# Patient Record
Sex: FEMALE | Race: WHITE | NOT HISPANIC OR LATINO | Employment: OTHER | URBAN - METROPOLITAN AREA
[De-identification: names, ages, dates, MRNs, and addresses within clinical notes are randomized per-mention and may not be internally consistent; named-entity substitution may affect disease eponyms.]

---

## 2017-05-10 ENCOUNTER — APPOINTMENT (OUTPATIENT)
Dept: CT IMAGING | Facility: HOSPITAL | Age: 82
End: 2017-05-10

## 2017-05-10 ENCOUNTER — HOSPITAL ENCOUNTER (INPATIENT)
Facility: HOSPITAL | Age: 82
LOS: 5 days | Discharge: NURSING, REHAB OR LONG-TERM CARE | End: 2017-05-16
Attending: EMERGENCY MEDICINE | Admitting: INTERNAL MEDICINE

## 2017-05-10 DIAGNOSIS — Z74.09 IMPAIRED FUNCTIONAL MOBILITY, BALANCE, GAIT, AND ENDURANCE: ICD-10-CM

## 2017-05-10 DIAGNOSIS — M06.9 RHEUMATOID ARTHRITIS INVOLVING MULTIPLE SITES, UNSPECIFIED RHEUMATOID FACTOR PRESENCE: ICD-10-CM

## 2017-05-10 DIAGNOSIS — Z78.9 IMPAIRED MOBILITY AND ADLS: ICD-10-CM

## 2017-05-10 DIAGNOSIS — M54.9 INTRACTABLE BACK PAIN: ICD-10-CM

## 2017-05-10 DIAGNOSIS — R53.1 WEAKNESS: ICD-10-CM

## 2017-05-10 DIAGNOSIS — R29.898 WEAKNESS OF BOTH LOWER EXTREMITIES: ICD-10-CM

## 2017-05-10 DIAGNOSIS — Z74.09 IMPAIRED MOBILITY AND ADLS: ICD-10-CM

## 2017-05-10 DIAGNOSIS — M54.5 LOW BACK PAIN, UNSPECIFIED BACK PAIN LATERALITY, UNSPECIFIED CHRONICITY, WITH SCIATICA PRESENCE UNSPECIFIED: Primary | ICD-10-CM
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
## 2017-05-10 DIAGNOSIS — M48.00 SPINAL STENOSIS, UNSPECIFIED SPINAL REGION: ICD-10-CM

## 2017-05-10 LAB
ALBUMIN SERPL-MCNC: 3.9 G/DL (ref 3.2–4.8)
ALBUMIN/GLOB SERPL: 1.8 G/DL (ref 1.5–2.5)
ALP SERPL-CCNC: 115 U/L (ref 25–100)
ALT SERPL W P-5'-P-CCNC: 22 U/L (ref 7–40)
ANION GAP SERPL CALCULATED.3IONS-SCNC: 5 MMOL/L (ref 3–11)
AST SERPL-CCNC: 20 U/L (ref 0–33)
BACTERIA UR QL AUTO: ABNORMAL /HPF
BASOPHILS # BLD AUTO: 0.02 10*3/MM3 (ref 0–0.2)
BASOPHILS NFR BLD AUTO: 0.3 % (ref 0–1)
BILIRUB SERPL-MCNC: 0.4 MG/DL (ref 0.3–1.2)
BILIRUB UR QL STRIP: NEGATIVE
BUN BLD-MCNC: 14 MG/DL (ref 9–23)
BUN/CREAT SERPL: 23.3 (ref 7–25)
CALCIUM SPEC-SCNC: 9.9 MG/DL (ref 8.7–10.4)
CHLORIDE SERPL-SCNC: 104 MMOL/L (ref 99–109)
CLARITY UR: CLEAR
CO2 SERPL-SCNC: 33 MMOL/L (ref 20–31)
COLOR UR: YELLOW
CREAT BLD-MCNC: 0.6 MG/DL (ref 0.6–1.3)
DEPRECATED RDW RBC AUTO: 58.6 FL (ref 37–54)
EOSINOPHIL # BLD AUTO: 0.34 10*3/MM3 (ref 0.1–0.3)
EOSINOPHIL NFR BLD AUTO: 5 % (ref 0–3)
ERYTHROCYTE [DISTWIDTH] IN BLOOD BY AUTOMATED COUNT: 15.4 % (ref 11.3–14.5)
GFR SERPL CREATININE-BSD FRML MDRD: 96 ML/MIN/1.73
GLOBULIN UR ELPH-MCNC: 2.2 GM/DL
GLUCOSE BLD-MCNC: 86 MG/DL (ref 70–100)
GLUCOSE UR STRIP-MCNC: NEGATIVE MG/DL
HCT VFR BLD AUTO: 38.1 % (ref 34.5–44)
HGB BLD-MCNC: 12.1 G/DL (ref 11.5–15.5)
HGB UR QL STRIP.AUTO: NEGATIVE
HYALINE CASTS UR QL AUTO: ABNORMAL /LPF
IMM GRANULOCYTES # BLD: 0.03 10*3/MM3 (ref 0–0.03)
IMM GRANULOCYTES NFR BLD: 0.4 % (ref 0–0.6)
KETONES UR QL STRIP: NEGATIVE
LEUKOCYTE ESTERASE UR QL STRIP.AUTO: ABNORMAL
LYMPHOCYTES # BLD AUTO: 3.05 10*3/MM3 (ref 0.6–4.8)
LYMPHOCYTES NFR BLD AUTO: 45.1 % (ref 24–44)
MCH RBC QN AUTO: 32.9 PG (ref 27–31)
MCHC RBC AUTO-ENTMCNC: 31.8 G/DL (ref 32–36)
MCV RBC AUTO: 103.5 FL (ref 80–99)
MONOCYTES # BLD AUTO: 0.67 10*3/MM3 (ref 0–1)
MONOCYTES NFR BLD AUTO: 9.9 % (ref 0–12)
NEUTROPHILS # BLD AUTO: 2.65 10*3/MM3 (ref 1.5–8.3)
NEUTROPHILS NFR BLD AUTO: 39.3 % (ref 41–71)
NITRITE UR QL STRIP: NEGATIVE
PH UR STRIP.AUTO: 7.5 [PH] (ref 5–8)
PLATELET # BLD AUTO: 182 10*3/MM3 (ref 150–450)
PMV BLD AUTO: 10.7 FL (ref 6–12)
POTASSIUM BLD-SCNC: 4.3 MMOL/L (ref 3.5–5.5)
PROT SERPL-MCNC: 6.1 G/DL (ref 5.7–8.2)
PROT UR QL STRIP: NEGATIVE
RBC # BLD AUTO: 3.68 10*6/MM3 (ref 3.89–5.14)
RBC # UR: ABNORMAL /HPF
REF LAB TEST METHOD: ABNORMAL
SODIUM BLD-SCNC: 142 MMOL/L (ref 132–146)
SP GR UR STRIP: 1.01 (ref 1–1.03)
SQUAMOUS #/AREA URNS HPF: ABNORMAL /HPF
UROBILINOGEN UR QL STRIP: ABNORMAL
WBC NRBC COR # BLD: 6.76 10*3/MM3 (ref 3.5–10.8)
WBC UR QL AUTO: ABNORMAL /HPF

## 2017-05-10 PROCEDURE — 80053 COMPREHEN METABOLIC PANEL: CPT | Performed by: NURSE PRACTITIONER

## 2017-05-10 PROCEDURE — 72131 CT LUMBAR SPINE W/O DYE: CPT

## 2017-05-10 PROCEDURE — 72192 CT PELVIS W/O DYE: CPT

## 2017-05-10 PROCEDURE — 85025 COMPLETE CBC W/AUTO DIFF WBC: CPT | Performed by: NURSE PRACTITIONER

## 2017-05-10 PROCEDURE — 81001 URINALYSIS AUTO W/SCOPE: CPT | Performed by: NURSE PRACTITIONER

## 2017-05-10 PROCEDURE — 87086 URINE CULTURE/COLONY COUNT: CPT | Performed by: NURSE PRACTITIONER

## 2017-05-10 PROCEDURE — 99284 EMERGENCY DEPT VISIT MOD MDM: CPT

## 2017-05-10 RX ORDER — BENAZEPRIL HYDROCHLORIDE 10 MG/1
10 TABLET ORAL DAILY
COMMUNITY

## 2017-05-10 RX ORDER — DOCUSATE SODIUM 100 MG/1
100 CAPSULE, LIQUID FILLED ORAL 2 TIMES DAILY
COMMUNITY

## 2017-05-10 RX ORDER — BACLOFEN 10 MG/1
10 TABLET ORAL 4 TIMES DAILY
COMMUNITY

## 2017-05-10 RX ORDER — LOVASTATIN 20 MG/1
20 TABLET ORAL NIGHTLY
COMMUNITY
End: 2017-06-05

## 2017-05-10 RX ORDER — COLCHICINE 0.6 MG/1
0.6 TABLET ORAL DAILY
COMMUNITY

## 2017-05-10 RX ORDER — ALLOPURINOL 100 MG/1
200 TABLET ORAL DAILY
COMMUNITY

## 2017-05-10 RX ORDER — CHLORAL HYDRATE 500 MG
1000 CAPSULE ORAL
COMMUNITY

## 2017-05-10 RX ORDER — GABAPENTIN 800 MG/1
1600 TABLET ORAL EVERY 6 HOURS
COMMUNITY

## 2017-05-10 RX ORDER — OMEPRAZOLE 20 MG/1
20 CAPSULE, DELAYED RELEASE ORAL DAILY
COMMUNITY
End: 2017-06-05

## 2017-05-10 RX ORDER — LEVOTHYROXINE SODIUM 0.07 MG/1
75 TABLET ORAL DAILY
COMMUNITY

## 2017-05-10 RX ORDER — SODIUM CHLORIDE 0.9 % (FLUSH) 0.9 %
10 SYRINGE (ML) INJECTION AS NEEDED
Status: DISCONTINUED | OUTPATIENT
Start: 2017-05-10 | End: 2017-05-16 | Stop reason: HOSPADM

## 2017-05-11 ENCOUNTER — APPOINTMENT (OUTPATIENT)
Dept: GENERAL RADIOLOGY | Facility: HOSPITAL | Age: 82
End: 2017-05-11

## 2017-05-11 ENCOUNTER — APPOINTMENT (OUTPATIENT)
Dept: MRI IMAGING | Facility: HOSPITAL | Age: 82
End: 2017-05-11

## 2017-05-11 PROBLEM — M06.9 RHEUMATOID ARTHRITIS (HCC): Status: ACTIVE | Noted: 2017-05-11

## 2017-05-11 PROBLEM — M54.9 INTRACTABLE BACK PAIN: Status: ACTIVE | Noted: 2017-05-11

## 2017-05-11 PROBLEM — K21.9 GERD (GASTROESOPHAGEAL REFLUX DISEASE): Status: ACTIVE | Noted: 2017-05-11

## 2017-05-11 PROBLEM — E03.9 HYPOTHYROID: Status: ACTIVE | Noted: 2017-05-11

## 2017-05-11 PROBLEM — I10 HYPERTENSION: Status: ACTIVE | Noted: 2017-05-11

## 2017-05-11 PROBLEM — M48.00 SPINAL STENOSIS: Status: ACTIVE | Noted: 2017-05-11

## 2017-05-11 PROBLEM — M54.50 LOW BACK PAIN: Status: ACTIVE | Noted: 2017-05-11

## 2017-05-11 PROBLEM — R53.1 WEAKNESS: Status: ACTIVE | Noted: 2017-05-11

## 2017-05-11 PROBLEM — E78.5 HYPERLIPIDEMIA: Status: ACTIVE | Noted: 2017-05-11

## 2017-05-11 LAB
ALBUMIN SERPL-MCNC: 4.5 G/DL (ref 3.2–4.8)
ALBUMIN/GLOB SERPL: 1.7 G/DL (ref 1.5–2.5)
ALP SERPL-CCNC: 134 U/L (ref 25–100)
ALT SERPL W P-5'-P-CCNC: 24 U/L (ref 7–40)
ANION GAP SERPL CALCULATED.3IONS-SCNC: 4 MMOL/L (ref 3–11)
ANION GAP SERPL CALCULATED.3IONS-SCNC: 9 MMOL/L (ref 3–11)
AST SERPL-CCNC: 23 U/L (ref 0–33)
BILIRUB SERPL-MCNC: 0.6 MG/DL (ref 0.3–1.2)
BUN BLD-MCNC: 12 MG/DL (ref 9–23)
BUN BLD-MCNC: 17 MG/DL (ref 9–23)
BUN/CREAT SERPL: 20 (ref 7–25)
BUN/CREAT SERPL: 21.3 (ref 7–25)
CA-I SERPL ISE-MCNC: 1.25 MMOL/L (ref 1.12–1.32)
CALCIUM SPEC-SCNC: 10 MG/DL (ref 8.7–10.4)
CALCIUM SPEC-SCNC: 10.7 MG/DL (ref 8.7–10.4)
CHLORIDE SERPL-SCNC: 106 MMOL/L (ref 99–109)
CHLORIDE SERPL-SCNC: 109 MMOL/L (ref 99–109)
CO2 SERPL-SCNC: 28 MMOL/L (ref 20–31)
CO2 SERPL-SCNC: 31 MMOL/L (ref 20–31)
CREAT BLD-MCNC: 0.6 MG/DL (ref 0.6–1.3)
CREAT BLD-MCNC: 0.8 MG/DL (ref 0.6–1.3)
DEPRECATED RDW RBC AUTO: 56.1 FL (ref 37–54)
ERYTHROCYTE [DISTWIDTH] IN BLOOD BY AUTOMATED COUNT: 15.3 % (ref 11.3–14.5)
GFR SERPL CREATININE-BSD FRML MDRD: 69 ML/MIN/1.73
GFR SERPL CREATININE-BSD FRML MDRD: 96 ML/MIN/1.73
GLOBULIN UR ELPH-MCNC: 2.7 GM/DL
GLUCOSE BLD-MCNC: 190 MG/DL (ref 70–100)
GLUCOSE BLD-MCNC: 94 MG/DL (ref 70–100)
GLUCOSE BLDC GLUCOMTR-MCNC: 120 MG/DL (ref 70–130)
HCT VFR BLD AUTO: 37.8 % (ref 34.5–44)
HGB BLD-MCNC: 12 G/DL (ref 11.5–15.5)
MCH RBC QN AUTO: 31.9 PG (ref 27–31)
MCHC RBC AUTO-ENTMCNC: 31.7 G/DL (ref 32–36)
MCV RBC AUTO: 100.5 FL (ref 80–99)
PLATELET # BLD AUTO: 191 10*3/MM3 (ref 150–450)
PMV BLD AUTO: 11.3 FL (ref 6–12)
POTASSIUM BLD-SCNC: 3.7 MMOL/L (ref 3.5–5.5)
POTASSIUM BLD-SCNC: 4.5 MMOL/L (ref 3.5–5.5)
PROT SERPL-MCNC: 7.2 G/DL (ref 5.7–8.2)
RBC # BLD AUTO: 3.76 10*6/MM3 (ref 3.89–5.14)
SODIUM BLD-SCNC: 141 MMOL/L (ref 132–146)
SODIUM BLD-SCNC: 146 MMOL/L (ref 132–146)
WBC NRBC COR # BLD: 5.3 10*3/MM3 (ref 3.5–10.8)

## 2017-05-11 PROCEDURE — 80053 COMPREHEN METABOLIC PANEL: CPT | Performed by: PHYSICIAN ASSISTANT

## 2017-05-11 PROCEDURE — 72080 X-RAY EXAM THORACOLMB 2/> VW: CPT

## 2017-05-11 PROCEDURE — 82962 GLUCOSE BLOOD TEST: CPT

## 2017-05-11 PROCEDURE — 82330 ASSAY OF CALCIUM: CPT | Performed by: HOSPITALIST

## 2017-05-11 PROCEDURE — 93010 ELECTROCARDIOGRAM REPORT: CPT | Performed by: INTERNAL MEDICINE

## 2017-05-11 PROCEDURE — 85027 COMPLETE CBC AUTOMATED: CPT | Performed by: PHYSICIAN ASSISTANT

## 2017-05-11 PROCEDURE — 25010000002 HYDRALAZINE PER 20 MG: Performed by: PHYSICIAN ASSISTANT

## 2017-05-11 PROCEDURE — 93005 ELECTROCARDIOGRAM TRACING: CPT | Performed by: HOSPITALIST

## 2017-05-11 PROCEDURE — 97162 PT EVAL MOD COMPLEX 30 MIN: CPT

## 2017-05-11 PROCEDURE — 97166 OT EVAL MOD COMPLEX 45 MIN: CPT

## 2017-05-11 PROCEDURE — 72148 MRI LUMBAR SPINE W/O DYE: CPT

## 2017-05-11 PROCEDURE — 99221 1ST HOSP IP/OBS SF/LOW 40: CPT | Performed by: PHYSICIAN ASSISTANT

## 2017-05-11 PROCEDURE — 99223 1ST HOSP IP/OBS HIGH 75: CPT | Performed by: INTERNAL MEDICINE

## 2017-05-11 RX ORDER — PANTOPRAZOLE SODIUM 40 MG/1
40 TABLET, DELAYED RELEASE ORAL
Status: DISCONTINUED | OUTPATIENT
Start: 2017-05-11 | End: 2017-05-16 | Stop reason: HOSPADM

## 2017-05-11 RX ORDER — SODIUM CHLORIDE 0.9 % (FLUSH) 0.9 %
1-10 SYRINGE (ML) INJECTION AS NEEDED
Status: DISCONTINUED | OUTPATIENT
Start: 2017-05-11 | End: 2017-05-16 | Stop reason: HOSPADM

## 2017-05-11 RX ORDER — ONDANSETRON 4 MG/1
4 TABLET, FILM COATED ORAL EVERY 6 HOURS PRN
Status: DISCONTINUED | OUTPATIENT
Start: 2017-05-11 | End: 2017-05-16 | Stop reason: HOSPADM

## 2017-05-11 RX ORDER — ASPIRIN 81 MG/1
81 TABLET, CHEWABLE ORAL DAILY
Status: DISCONTINUED | OUTPATIENT
Start: 2017-05-11 | End: 2017-05-16 | Stop reason: HOSPADM

## 2017-05-11 RX ORDER — ALLOPURINOL 100 MG/1
100 TABLET ORAL DAILY
Status: DISCONTINUED | OUTPATIENT
Start: 2017-05-11 | End: 2017-05-16 | Stop reason: HOSPADM

## 2017-05-11 RX ORDER — PRAVASTATIN SODIUM 20 MG
20 TABLET ORAL NIGHTLY
Status: DISCONTINUED | OUTPATIENT
Start: 2017-05-11 | End: 2017-05-16 | Stop reason: HOSPADM

## 2017-05-11 RX ORDER — LEVOTHYROXINE SODIUM 0.07 MG/1
75 TABLET ORAL DAILY
Status: DISCONTINUED | OUTPATIENT
Start: 2017-05-11 | End: 2017-05-16 | Stop reason: HOSPADM

## 2017-05-11 RX ORDER — BENAZEPRIL HYDROCHLORIDE 5 MG/1
10 TABLET, FILM COATED ORAL DAILY
Status: DISCONTINUED | OUTPATIENT
Start: 2017-05-11 | End: 2017-05-16 | Stop reason: HOSPADM

## 2017-05-11 RX ORDER — ACETAMINOPHEN 325 MG/1
650 TABLET ORAL EVERY 4 HOURS PRN
Status: DISCONTINUED | OUTPATIENT
Start: 2017-05-11 | End: 2017-05-16 | Stop reason: HOSPADM

## 2017-05-11 RX ORDER — DOCUSATE SODIUM 100 MG/1
100 CAPSULE, LIQUID FILLED ORAL 2 TIMES DAILY
Status: DISCONTINUED | OUTPATIENT
Start: 2017-05-11 | End: 2017-05-11

## 2017-05-11 RX ORDER — BACLOFEN 10 MG/1
10 TABLET ORAL 3 TIMES DAILY
Status: DISCONTINUED | OUTPATIENT
Start: 2017-05-11 | End: 2017-05-15

## 2017-05-11 RX ORDER — CASTOR OIL AND BALSAM, PERU 788; 87 MG/G; MG/G
OINTMENT TOPICAL EVERY 12 HOURS SCHEDULED
Status: DISCONTINUED | OUTPATIENT
Start: 2017-05-11 | End: 2017-05-16 | Stop reason: HOSPADM

## 2017-05-11 RX ORDER — MORPHINE SULFATE 2 MG/ML
2 INJECTION, SOLUTION INTRAMUSCULAR; INTRAVENOUS EVERY 4 HOURS PRN
Status: DISCONTINUED | OUTPATIENT
Start: 2017-05-11 | End: 2017-05-16 | Stop reason: HOSPADM

## 2017-05-11 RX ORDER — ONDANSETRON 2 MG/ML
4 INJECTION INTRAMUSCULAR; INTRAVENOUS EVERY 6 HOURS PRN
Status: DISCONTINUED | OUTPATIENT
Start: 2017-05-11 | End: 2017-05-16 | Stop reason: HOSPADM

## 2017-05-11 RX ORDER — HYDRALAZINE HYDROCHLORIDE 20 MG/ML
10 INJECTION INTRAMUSCULAR; INTRAVENOUS EVERY 6 HOURS PRN
Status: DISCONTINUED | OUTPATIENT
Start: 2017-05-11 | End: 2017-05-16 | Stop reason: HOSPADM

## 2017-05-11 RX ORDER — SODIUM CHLORIDE 9 MG/ML
100 INJECTION, SOLUTION INTRAVENOUS CONTINUOUS
Status: DISCONTINUED | OUTPATIENT
Start: 2017-05-11 | End: 2017-05-16 | Stop reason: HOSPADM

## 2017-05-11 RX ORDER — COLCHICINE 0.6 MG/1
0.6 TABLET ORAL DAILY
Status: DISCONTINUED | OUTPATIENT
Start: 2017-05-11 | End: 2017-05-16 | Stop reason: HOSPADM

## 2017-05-11 RX ORDER — MORPHINE SULFATE 4 MG/ML
4 INJECTION, SOLUTION INTRAMUSCULAR; INTRAVENOUS EVERY 4 HOURS PRN
Status: DISCONTINUED | OUTPATIENT
Start: 2017-05-11 | End: 2017-05-16 | Stop reason: HOSPADM

## 2017-05-11 RX ADMIN — BACLOFEN 10 MG: 10 TABLET ORAL at 08:19

## 2017-05-11 RX ADMIN — CASTOR OIL AND BALSAM, PERU: 788; 87 OINTMENT TOPICAL at 14:35

## 2017-05-11 RX ADMIN — ALLOPURINOL 100 MG: 100 TABLET ORAL at 08:19

## 2017-05-11 RX ADMIN — ACETAMINOPHEN 650 MG: 325 TABLET, FILM COATED ORAL at 22:19

## 2017-05-11 RX ADMIN — DOCUSATE SODIUM 100 MG: 100 CAPSULE, LIQUID FILLED ORAL at 08:19

## 2017-05-11 RX ADMIN — BENAZEPRIL HYDROCHLORIDE 10 MG: 5 TABLET, COATED ORAL at 08:19

## 2017-05-11 RX ADMIN — BACLOFEN 10 MG: 10 TABLET ORAL at 22:19

## 2017-05-11 RX ADMIN — ASPIRIN 81 MG 81 MG: 81 TABLET ORAL at 08:19

## 2017-05-11 RX ADMIN — HYDRALAZINE HYDROCHLORIDE 10 MG: 20 INJECTION INTRAMUSCULAR; INTRAVENOUS at 15:31

## 2017-05-11 RX ADMIN — CASTOR OIL AND BALSAM, PERU: 788; 87 OINTMENT TOPICAL at 22:19

## 2017-05-11 RX ADMIN — BACLOFEN 10 MG: 10 TABLET ORAL at 15:20

## 2017-05-11 RX ADMIN — LEVOTHYROXINE SODIUM 75 MCG: 75 TABLET ORAL at 05:34

## 2017-05-11 RX ADMIN — COLCHICINE 0.6 MG: 0.6 TABLET, FILM COATED ORAL at 08:19

## 2017-05-11 RX ADMIN — SODIUM CHLORIDE 100 ML/HR: 9 INJECTION, SOLUTION INTRAVENOUS at 17:54

## 2017-05-11 RX ADMIN — PRAVASTATIN SODIUM 20 MG: 20 TABLET ORAL at 22:19

## 2017-05-11 RX ADMIN — PANTOPRAZOLE SODIUM 40 MG: 40 TABLET, DELAYED RELEASE ORAL at 05:34

## 2017-05-12 LAB
ANION GAP SERPL CALCULATED.3IONS-SCNC: 10 MMOL/L (ref 3–11)
BASOPHILS # BLD AUTO: 0.01 10*3/MM3 (ref 0–0.2)
BASOPHILS NFR BLD AUTO: 0.1 % (ref 0–1)
BUN BLD-MCNC: 16 MG/DL (ref 9–23)
BUN/CREAT SERPL: 26.7 (ref 7–25)
C DIFF TOX GENS STL QL NAA+PROBE: NOT DETECTED
CALCIUM SPEC-SCNC: 10.4 MG/DL (ref 8.7–10.4)
CHLORIDE SERPL-SCNC: 107 MMOL/L (ref 99–109)
CO2 SERPL-SCNC: 25 MMOL/L (ref 20–31)
CREAT BLD-MCNC: 0.6 MG/DL (ref 0.6–1.3)
DEPRECATED RDW RBC AUTO: 56.9 FL (ref 37–54)
EOSINOPHIL # BLD AUTO: 0.01 10*3/MM3 (ref 0.1–0.3)
EOSINOPHIL NFR BLD AUTO: 0.1 % (ref 0–3)
ERYTHROCYTE [DISTWIDTH] IN BLOOD BY AUTOMATED COUNT: 15.6 % (ref 11.3–14.5)
GFR SERPL CREATININE-BSD FRML MDRD: 96 ML/MIN/1.73
GLUCOSE BLD-MCNC: 124 MG/DL (ref 70–100)
HCT VFR BLD AUTO: 43.1 % (ref 34.5–44)
HGB BLD-MCNC: 14.1 G/DL (ref 11.5–15.5)
IMM GRANULOCYTES # BLD: 0.02 10*3/MM3 (ref 0–0.03)
IMM GRANULOCYTES NFR BLD: 0.2 % (ref 0–0.6)
LYMPHOCYTES # BLD AUTO: 3.02 10*3/MM3 (ref 0.6–4.8)
LYMPHOCYTES NFR BLD AUTO: 26.1 % (ref 24–44)
MCH RBC QN AUTO: 32.6 PG (ref 27–31)
MCHC RBC AUTO-ENTMCNC: 32.7 G/DL (ref 32–36)
MCV RBC AUTO: 99.5 FL (ref 80–99)
MONOCYTES # BLD AUTO: 0.8 10*3/MM3 (ref 0–1)
MONOCYTES NFR BLD AUTO: 6.9 % (ref 0–12)
NEUTROPHILS # BLD AUTO: 7.7 10*3/MM3 (ref 1.5–8.3)
NEUTROPHILS NFR BLD AUTO: 66.6 % (ref 41–71)
PLATELET # BLD AUTO: 241 10*3/MM3 (ref 150–450)
PMV BLD AUTO: 11.8 FL (ref 6–12)
POTASSIUM BLD-SCNC: 3.5 MMOL/L (ref 3.5–5.5)
RBC # BLD AUTO: 4.33 10*6/MM3 (ref 3.89–5.14)
SODIUM BLD-SCNC: 142 MMOL/L (ref 132–146)
WBC NRBC COR # BLD: 11.56 10*3/MM3 (ref 3.5–10.8)

## 2017-05-12 PROCEDURE — 87045 FECES CULTURE AEROBIC BACT: CPT | Performed by: FAMILY MEDICINE

## 2017-05-12 PROCEDURE — 97530 THERAPEUTIC ACTIVITIES: CPT

## 2017-05-12 PROCEDURE — 99232 SBSQ HOSP IP/OBS MODERATE 35: CPT | Performed by: FAMILY MEDICINE

## 2017-05-12 PROCEDURE — 97110 THERAPEUTIC EXERCISES: CPT

## 2017-05-12 PROCEDURE — 87046 STOOL CULTR AEROBIC BACT EA: CPT | Performed by: FAMILY MEDICINE

## 2017-05-12 PROCEDURE — 87493 C DIFF AMPLIFIED PROBE: CPT | Performed by: NURSE PRACTITIONER

## 2017-05-12 PROCEDURE — 85025 COMPLETE CBC W/AUTO DIFF WBC: CPT | Performed by: FAMILY MEDICINE

## 2017-05-12 PROCEDURE — 25010000002 HYDRALAZINE PER 20 MG: Performed by: PHYSICIAN ASSISTANT

## 2017-05-12 PROCEDURE — 80048 BASIC METABOLIC PNL TOTAL CA: CPT | Performed by: FAMILY MEDICINE

## 2017-05-12 RX ORDER — AMLODIPINE BESYLATE 5 MG/1
5 TABLET ORAL
Status: DISCONTINUED | OUTPATIENT
Start: 2017-05-12 | End: 2017-05-15

## 2017-05-12 RX ADMIN — LEVOTHYROXINE SODIUM 75 MCG: 75 TABLET ORAL at 06:22

## 2017-05-12 RX ADMIN — Medication 5 MG: at 21:05

## 2017-05-12 RX ADMIN — PRAVASTATIN SODIUM 20 MG: 20 TABLET ORAL at 21:05

## 2017-05-12 RX ADMIN — BACLOFEN 10 MG: 10 TABLET ORAL at 11:00

## 2017-05-12 RX ADMIN — HYDRALAZINE HYDROCHLORIDE 10 MG: 20 INJECTION INTRAMUSCULAR; INTRAVENOUS at 22:08

## 2017-05-12 RX ADMIN — CASTOR OIL AND BALSAM, PERU: 788; 87 OINTMENT TOPICAL at 11:01

## 2017-05-12 RX ADMIN — ALLOPURINOL 100 MG: 100 TABLET ORAL at 10:59

## 2017-05-12 RX ADMIN — COLCHICINE 0.6 MG: 0.6 TABLET, FILM COATED ORAL at 11:00

## 2017-05-12 RX ADMIN — CASTOR OIL AND BALSAM, PERU: 788; 87 OINTMENT TOPICAL at 21:05

## 2017-05-12 RX ADMIN — BACLOFEN 10 MG: 10 TABLET ORAL at 17:20

## 2017-05-12 RX ADMIN — BACLOFEN 10 MG: 10 TABLET ORAL at 21:05

## 2017-05-12 RX ADMIN — AMLODIPINE BESYLATE 5 MG: 5 TABLET ORAL at 17:20

## 2017-05-12 RX ADMIN — BENAZEPRIL HYDROCHLORIDE 10 MG: 5 TABLET, COATED ORAL at 11:00

## 2017-05-12 RX ADMIN — PANTOPRAZOLE SODIUM 40 MG: 40 TABLET, DELAYED RELEASE ORAL at 06:22

## 2017-05-12 RX ADMIN — ASPIRIN 81 MG 81 MG: 81 TABLET ORAL at 11:00

## 2017-05-12 RX ADMIN — HYDRALAZINE HYDROCHLORIDE 10 MG: 20 INJECTION INTRAMUSCULAR; INTRAVENOUS at 04:04

## 2017-05-13 LAB
ANION GAP SERPL CALCULATED.3IONS-SCNC: 7 MMOL/L (ref 3–11)
BACTERIA SPEC AEROBE CULT: NORMAL
BASOPHILS # BLD AUTO: 0.01 10*3/MM3 (ref 0–0.2)
BASOPHILS NFR BLD AUTO: 0.1 % (ref 0–1)
BUN BLD-MCNC: 20 MG/DL (ref 9–23)
BUN/CREAT SERPL: 33.3 (ref 7–25)
CALCIUM SPEC-SCNC: 10.3 MG/DL (ref 8.7–10.4)
CHLORIDE SERPL-SCNC: 108 MMOL/L (ref 99–109)
CO2 SERPL-SCNC: 28 MMOL/L (ref 20–31)
CREAT BLD-MCNC: 0.6 MG/DL (ref 0.6–1.3)
DEPRECATED RDW RBC AUTO: 59.4 FL (ref 37–54)
EOSINOPHIL # BLD AUTO: 0.05 10*3/MM3 (ref 0.1–0.3)
EOSINOPHIL NFR BLD AUTO: 0.6 % (ref 0–3)
ERYTHROCYTE [DISTWIDTH] IN BLOOD BY AUTOMATED COUNT: 16 % (ref 11.3–14.5)
GFR SERPL CREATININE-BSD FRML MDRD: 96 ML/MIN/1.73
GLUCOSE BLD-MCNC: 97 MG/DL (ref 70–100)
HCT VFR BLD AUTO: 40.4 % (ref 34.5–44)
HGB BLD-MCNC: 12.9 G/DL (ref 11.5–15.5)
IMM GRANULOCYTES # BLD: 0.01 10*3/MM3 (ref 0–0.03)
IMM GRANULOCYTES NFR BLD: 0.1 % (ref 0–0.6)
LYMPHOCYTES # BLD AUTO: 2.25 10*3/MM3 (ref 0.6–4.8)
LYMPHOCYTES NFR BLD AUTO: 28.8 % (ref 24–44)
MAGNESIUM SERPL-MCNC: 1.7 MG/DL (ref 1.3–2.7)
MCH RBC QN AUTO: 32.3 PG (ref 27–31)
MCHC RBC AUTO-ENTMCNC: 31.9 G/DL (ref 32–36)
MCV RBC AUTO: 101.3 FL (ref 80–99)
MONOCYTES # BLD AUTO: 0.82 10*3/MM3 (ref 0–1)
MONOCYTES NFR BLD AUTO: 10.5 % (ref 0–12)
NEUTROPHILS # BLD AUTO: 4.68 10*3/MM3 (ref 1.5–8.3)
NEUTROPHILS NFR BLD AUTO: 59.9 % (ref 41–71)
PLATELET # BLD AUTO: 205 10*3/MM3 (ref 150–450)
PMV BLD AUTO: 11.7 FL (ref 6–12)
POTASSIUM BLD-SCNC: 4.1 MMOL/L (ref 3.5–5.5)
RBC # BLD AUTO: 3.99 10*6/MM3 (ref 3.89–5.14)
SODIUM BLD-SCNC: 143 MMOL/L (ref 132–146)
WBC NRBC COR # BLD: 7.82 10*3/MM3 (ref 3.5–10.8)

## 2017-05-13 PROCEDURE — 80048 BASIC METABOLIC PNL TOTAL CA: CPT | Performed by: FAMILY MEDICINE

## 2017-05-13 PROCEDURE — 83735 ASSAY OF MAGNESIUM: CPT | Performed by: FAMILY MEDICINE

## 2017-05-13 PROCEDURE — 99231 SBSQ HOSP IP/OBS SF/LOW 25: CPT | Performed by: FAMILY MEDICINE

## 2017-05-13 PROCEDURE — 85025 COMPLETE CBC W/AUTO DIFF WBC: CPT | Performed by: FAMILY MEDICINE

## 2017-05-13 RX ORDER — CETIRIZINE HYDROCHLORIDE 10 MG/1
10 TABLET ORAL DAILY
Status: DISCONTINUED | OUTPATIENT
Start: 2017-05-13 | End: 2017-05-16 | Stop reason: HOSPADM

## 2017-05-13 RX ADMIN — ASPIRIN 81 MG 81 MG: 81 TABLET ORAL at 08:20

## 2017-05-13 RX ADMIN — BACLOFEN 10 MG: 10 TABLET ORAL at 16:08

## 2017-05-13 RX ADMIN — Medication 5 MG: at 22:47

## 2017-05-13 RX ADMIN — ALLOPURINOL 100 MG: 100 TABLET ORAL at 08:20

## 2017-05-13 RX ADMIN — CASTOR OIL AND BALSAM, PERU: 788; 87 OINTMENT TOPICAL at 08:22

## 2017-05-13 RX ADMIN — BACLOFEN 10 MG: 10 TABLET ORAL at 08:20

## 2017-05-13 RX ADMIN — COLCHICINE 0.6 MG: 0.6 TABLET, FILM COATED ORAL at 08:20

## 2017-05-13 RX ADMIN — AMLODIPINE BESYLATE 5 MG: 5 TABLET ORAL at 08:20

## 2017-05-13 RX ADMIN — PRAVASTATIN SODIUM 20 MG: 20 TABLET ORAL at 21:55

## 2017-05-13 RX ADMIN — BACLOFEN 10 MG: 10 TABLET ORAL at 21:55

## 2017-05-13 RX ADMIN — LEVOTHYROXINE SODIUM 75 MCG: 75 TABLET ORAL at 06:01

## 2017-05-13 RX ADMIN — BENAZEPRIL HYDROCHLORIDE 10 MG: 5 TABLET, COATED ORAL at 08:20

## 2017-05-13 RX ADMIN — PANTOPRAZOLE SODIUM 40 MG: 40 TABLET, DELAYED RELEASE ORAL at 06:02

## 2017-05-13 RX ADMIN — CASTOR OIL AND BALSAM, PERU: 788; 87 OINTMENT TOPICAL at 22:47

## 2017-05-13 RX ADMIN — CETIRIZINE HYDROCHLORIDE 10 MG: 10 TABLET, FILM COATED ORAL at 16:08

## 2017-05-14 LAB — BACTERIA SPEC AEROBE CULT: NORMAL

## 2017-05-14 PROCEDURE — 99231 SBSQ HOSP IP/OBS SF/LOW 25: CPT | Performed by: FAMILY MEDICINE

## 2017-05-14 PROCEDURE — 97530 THERAPEUTIC ACTIVITIES: CPT

## 2017-05-14 PROCEDURE — 97110 THERAPEUTIC EXERCISES: CPT

## 2017-05-14 RX ADMIN — CASTOR OIL AND BALSAM, PERU: 788; 87 OINTMENT TOPICAL at 09:14

## 2017-05-14 RX ADMIN — CETIRIZINE HYDROCHLORIDE 10 MG: 10 TABLET, FILM COATED ORAL at 09:14

## 2017-05-14 RX ADMIN — LEVOTHYROXINE SODIUM 75 MCG: 75 TABLET ORAL at 05:58

## 2017-05-14 RX ADMIN — BACLOFEN 10 MG: 10 TABLET ORAL at 16:19

## 2017-05-14 RX ADMIN — PANTOPRAZOLE SODIUM 40 MG: 40 TABLET, DELAYED RELEASE ORAL at 05:58

## 2017-05-14 RX ADMIN — COLCHICINE 0.6 MG: 0.6 TABLET, FILM COATED ORAL at 09:14

## 2017-05-14 RX ADMIN — ALLOPURINOL 100 MG: 100 TABLET ORAL at 09:14

## 2017-05-14 RX ADMIN — AMLODIPINE BESYLATE 5 MG: 5 TABLET ORAL at 09:14

## 2017-05-14 RX ADMIN — ASPIRIN 81 MG 81 MG: 81 TABLET ORAL at 09:14

## 2017-05-14 RX ADMIN — PRAVASTATIN SODIUM 20 MG: 20 TABLET ORAL at 20:48

## 2017-05-14 RX ADMIN — BACLOFEN 10 MG: 10 TABLET ORAL at 20:48

## 2017-05-14 RX ADMIN — BENAZEPRIL HYDROCHLORIDE 10 MG: 5 TABLET, COATED ORAL at 09:15

## 2017-05-14 RX ADMIN — BACLOFEN 10 MG: 10 TABLET ORAL at 09:14

## 2017-05-14 RX ADMIN — CASTOR OIL AND BALSAM, PERU: 788; 87 OINTMENT TOPICAL at 20:52

## 2017-05-14 RX ADMIN — Medication 5 MG: at 20:48

## 2017-05-15 PROCEDURE — 97530 THERAPEUTIC ACTIVITIES: CPT

## 2017-05-15 PROCEDURE — 99232 SBSQ HOSP IP/OBS MODERATE 35: CPT | Performed by: NURSE PRACTITIONER

## 2017-05-15 PROCEDURE — 97110 THERAPEUTIC EXERCISES: CPT

## 2017-05-15 RX ORDER — AMLODIPINE BESYLATE 5 MG/1
5 TABLET ORAL ONCE
Status: COMPLETED | OUTPATIENT
Start: 2017-05-15 | End: 2017-05-15

## 2017-05-15 RX ORDER — BACLOFEN 10 MG/1
10 TABLET ORAL EVERY 6 HOURS SCHEDULED
Status: DISCONTINUED | OUTPATIENT
Start: 2017-05-15 | End: 2017-05-16 | Stop reason: HOSPADM

## 2017-05-15 RX ORDER — GABAPENTIN 400 MG/1
800 CAPSULE ORAL 4 TIMES DAILY
Status: DISCONTINUED | OUTPATIENT
Start: 2017-05-15 | End: 2017-05-16 | Stop reason: HOSPADM

## 2017-05-15 RX ORDER — AMLODIPINE BESYLATE 10 MG/1
10 TABLET ORAL
Status: DISCONTINUED | OUTPATIENT
Start: 2017-05-16 | End: 2017-05-16 | Stop reason: HOSPADM

## 2017-05-15 RX ADMIN — CASTOR OIL AND BALSAM, PERU: 788; 87 OINTMENT TOPICAL at 08:10

## 2017-05-15 RX ADMIN — GABAPENTIN 800 MG: 400 CAPSULE ORAL at 20:12

## 2017-05-15 RX ADMIN — BENAZEPRIL HYDROCHLORIDE 10 MG: 5 TABLET, COATED ORAL at 08:09

## 2017-05-15 RX ADMIN — CASTOR OIL AND BALSAM, PERU: 788; 87 OINTMENT TOPICAL at 20:12

## 2017-05-15 RX ADMIN — BACLOFEN 10 MG: 10 TABLET ORAL at 14:30

## 2017-05-15 RX ADMIN — ALLOPURINOL 100 MG: 100 TABLET ORAL at 08:10

## 2017-05-15 RX ADMIN — BACLOFEN 10 MG: 10 TABLET ORAL at 08:10

## 2017-05-15 RX ADMIN — GABAPENTIN 800 MG: 400 CAPSULE ORAL at 13:15

## 2017-05-15 RX ADMIN — LEVOTHYROXINE SODIUM 75 MCG: 75 TABLET ORAL at 06:20

## 2017-05-15 RX ADMIN — BACLOFEN 10 MG: 10 TABLET ORAL at 20:12

## 2017-05-15 RX ADMIN — AMLODIPINE BESYLATE 5 MG: 5 TABLET ORAL at 09:09

## 2017-05-15 RX ADMIN — Medication 5 MG: at 20:13

## 2017-05-15 RX ADMIN — PRAVASTATIN SODIUM 20 MG: 20 TABLET ORAL at 20:12

## 2017-05-15 RX ADMIN — COLCHICINE 0.6 MG: 0.6 TABLET, FILM COATED ORAL at 08:10

## 2017-05-15 RX ADMIN — AMLODIPINE BESYLATE 5 MG: 5 TABLET ORAL at 08:10

## 2017-05-15 RX ADMIN — PSYLLIUM HUSK 1 PACKET: 3.5 GRANULE ORAL at 13:15

## 2017-05-15 RX ADMIN — ASPIRIN 81 MG 81 MG: 81 TABLET ORAL at 08:09

## 2017-05-15 RX ADMIN — PANTOPRAZOLE SODIUM 40 MG: 40 TABLET, DELAYED RELEASE ORAL at 06:20

## 2017-05-15 RX ADMIN — CETIRIZINE HYDROCHLORIDE 10 MG: 10 TABLET, FILM COATED ORAL at 08:10

## 2017-05-15 RX ADMIN — GABAPENTIN 800 MG: 400 CAPSULE ORAL at 17:01

## 2017-05-16 VITALS
SYSTOLIC BLOOD PRESSURE: 111 MMHG | BODY MASS INDEX: 27.6 KG/M2 | WEIGHT: 150 LBS | RESPIRATION RATE: 18 BRPM | HEART RATE: 74 BPM | HEIGHT: 62 IN | OXYGEN SATURATION: 92 % | TEMPERATURE: 97.6 F | DIASTOLIC BLOOD PRESSURE: 62 MMHG

## 2017-05-16 PROCEDURE — 99239 HOSP IP/OBS DSCHRG MGMT >30: CPT | Performed by: NURSE PRACTITIONER

## 2017-05-16 RX ORDER — BISACODYL 10 MG
10 SUPPOSITORY, RECTAL RECTAL DAILY
Status: DISCONTINUED | OUTPATIENT
Start: 2017-05-16 | End: 2017-05-16 | Stop reason: HOSPADM

## 2017-05-16 RX ORDER — CASTOR OIL AND BALSAM, PERU 788; 87 MG/G; MG/G
1 OINTMENT TOPICAL EVERY 12 HOURS SCHEDULED
Start: 2017-05-16

## 2017-05-16 RX ORDER — CETIRIZINE HYDROCHLORIDE 10 MG/1
10 TABLET ORAL DAILY
Start: 2017-05-16

## 2017-05-16 RX ORDER — AMLODIPINE BESYLATE 5 MG/1
5 TABLET ORAL
Start: 2017-05-16

## 2017-05-16 RX ORDER — ACETAMINOPHEN 325 MG/1
650 TABLET ORAL EVERY 4 HOURS PRN
Refills: 0
Start: 2017-05-16 | End: 2017-06-05

## 2017-05-16 RX ADMIN — LEVOTHYROXINE SODIUM 75 MCG: 75 TABLET ORAL at 05:44

## 2017-05-16 RX ADMIN — COLCHICINE 0.6 MG: 0.6 TABLET, FILM COATED ORAL at 08:40

## 2017-05-16 RX ADMIN — BACLOFEN 10 MG: 10 TABLET ORAL at 08:40

## 2017-05-16 RX ADMIN — ASPIRIN 81 MG 81 MG: 81 TABLET ORAL at 08:40

## 2017-05-16 RX ADMIN — BACLOFEN 10 MG: 10 TABLET ORAL at 02:50

## 2017-05-16 RX ADMIN — CASTOR OIL AND BALSAM, PERU: 788; 87 OINTMENT TOPICAL at 08:40

## 2017-05-16 RX ADMIN — BISACODYL 10 MG: 10 SUPPOSITORY RECTAL at 09:42

## 2017-05-16 RX ADMIN — PSYLLIUM HUSK 1 PACKET: 3.5 GRANULE ORAL at 08:40

## 2017-05-16 RX ADMIN — GABAPENTIN 800 MG: 400 CAPSULE ORAL at 08:40

## 2017-05-16 RX ADMIN — ALLOPURINOL 100 MG: 100 TABLET ORAL at 08:40

## 2017-05-16 RX ADMIN — CETIRIZINE HYDROCHLORIDE 10 MG: 10 TABLET, FILM COATED ORAL at 08:40

## 2017-05-16 RX ADMIN — PANTOPRAZOLE SODIUM 40 MG: 40 TABLET, DELAYED RELEASE ORAL at 05:44

## 2017-06-05 ENCOUNTER — APPOINTMENT (OUTPATIENT)
Dept: MRI IMAGING | Facility: HOSPITAL | Age: 82
End: 2017-06-05

## 2017-06-05 ENCOUNTER — APPOINTMENT (OUTPATIENT)
Dept: GENERAL RADIOLOGY | Facility: HOSPITAL | Age: 82
End: 2017-06-05

## 2017-06-05 ENCOUNTER — HOSPITAL ENCOUNTER (OUTPATIENT)
Facility: HOSPITAL | Age: 82
Setting detail: OBSERVATION
Discharge: NURSING, REHAB OR LONG-TERM CARE | End: 2017-06-09
Attending: EMERGENCY MEDICINE | Admitting: INTERNAL MEDICINE

## 2017-06-05 ENCOUNTER — APPOINTMENT (OUTPATIENT)
Dept: CT IMAGING | Facility: HOSPITAL | Age: 82
End: 2017-06-05

## 2017-06-05 DIAGNOSIS — A41.9 SEPSIS, DUE TO UNSPECIFIED ORGANISM: ICD-10-CM

## 2017-06-05 DIAGNOSIS — R40.0 SOMNOLENCE: Primary | ICD-10-CM

## 2017-06-05 DIAGNOSIS — Z74.09 IMPAIRED FUNCTIONAL MOBILITY, BALANCE, GAIT, AND ENDURANCE: ICD-10-CM

## 2017-06-05 DIAGNOSIS — Z78.9 IMPAIRED MOBILITY AND ADLS: ICD-10-CM

## 2017-06-05 DIAGNOSIS — Z74.09 IMPAIRED MOBILITY AND ADLS: ICD-10-CM

## 2017-06-05 LAB
ALBUMIN SERPL-MCNC: 3.8 G/DL (ref 3.2–4.8)
ALBUMIN/GLOB SERPL: 1.8 G/DL (ref 1.5–2.5)
ALP SERPL-CCNC: 107 U/L (ref 25–100)
ALT SERPL W P-5'-P-CCNC: 31 U/L (ref 7–40)
ANION GAP SERPL CALCULATED.3IONS-SCNC: 7 MMOL/L (ref 3–11)
AST SERPL-CCNC: 24 U/L (ref 0–33)
BASOPHILS # BLD AUTO: 0.02 10*3/MM3 (ref 0–0.2)
BASOPHILS NFR BLD AUTO: 0.1 % (ref 0–1)
BILIRUB SERPL-MCNC: 0.7 MG/DL (ref 0.3–1.2)
BILIRUB UR QL STRIP: NEGATIVE
BILIRUB UR QL STRIP: NEGATIVE
BUN BLD-MCNC: 21 MG/DL (ref 9–23)
BUN/CREAT SERPL: 30 (ref 7–25)
CALCIUM SPEC-SCNC: 9.8 MG/DL (ref 8.7–10.4)
CHLORIDE SERPL-SCNC: 103 MMOL/L (ref 99–109)
CLARITY UR: CLEAR
CLARITY UR: CLEAR
CO2 SERPL-SCNC: 29 MMOL/L (ref 20–31)
COLOR UR: YELLOW
COLOR UR: YELLOW
CREAT BLD-MCNC: 0.7 MG/DL (ref 0.6–1.3)
CRP SERPL-MCNC: 7.75 MG/DL (ref 0–1)
D-LACTATE SERPL-SCNC: 1.6 MMOL/L (ref 0.5–2)
D-LACTATE SERPL-SCNC: 1.6 MMOL/L (ref 0.5–2)
DEPRECATED RDW RBC AUTO: 59 FL (ref 37–54)
EOSINOPHIL # BLD AUTO: 0.02 10*3/MM3 (ref 0.1–0.3)
EOSINOPHIL NFR BLD AUTO: 0.1 % (ref 0–3)
ERYTHROCYTE [DISTWIDTH] IN BLOOD BY AUTOMATED COUNT: 15.6 % (ref 11.3–14.5)
ERYTHROCYTE [SEDIMENTATION RATE] IN BLOOD: 26 MM/HR (ref 0–30)
GFR SERPL CREATININE-BSD FRML MDRD: 80 ML/MIN/1.73
GLOBULIN UR ELPH-MCNC: 2.1 GM/DL
GLUCOSE BLD-MCNC: 102 MG/DL (ref 70–100)
GLUCOSE UR STRIP-MCNC: NEGATIVE MG/DL
GLUCOSE UR STRIP-MCNC: NEGATIVE MG/DL
HCT VFR BLD AUTO: 38.6 % (ref 34.5–44)
HGB BLD-MCNC: 12.5 G/DL (ref 11.5–15.5)
HGB UR QL STRIP.AUTO: NEGATIVE
HGB UR QL STRIP.AUTO: NEGATIVE
HOLD SPECIMEN: NORMAL
IMM GRANULOCYTES # BLD: 0.05 10*3/MM3 (ref 0–0.03)
IMM GRANULOCYTES NFR BLD: 0.3 % (ref 0–0.6)
KETONES UR QL STRIP: NEGATIVE
KETONES UR QL STRIP: NEGATIVE
LEUKOCYTE ESTERASE UR QL STRIP.AUTO: NEGATIVE
LEUKOCYTE ESTERASE UR QL STRIP.AUTO: NEGATIVE
LYMPHOCYTES # BLD AUTO: 3.17 10*3/MM3 (ref 0.6–4.8)
LYMPHOCYTES NFR BLD AUTO: 18 % (ref 24–44)
MAGNESIUM SERPL-MCNC: 1.9 MG/DL (ref 1.3–2.7)
MCH RBC QN AUTO: 33.5 PG (ref 27–31)
MCHC RBC AUTO-ENTMCNC: 32.4 G/DL (ref 32–36)
MCV RBC AUTO: 103.5 FL (ref 80–99)
MONOCYTES # BLD AUTO: 1.51 10*3/MM3 (ref 0–1)
MONOCYTES NFR BLD AUTO: 8.6 % (ref 0–12)
NEUTROPHILS # BLD AUTO: 12.89 10*3/MM3 (ref 1.5–8.3)
NEUTROPHILS NFR BLD AUTO: 72.9 % (ref 41–71)
NITRITE UR QL STRIP: NEGATIVE
NITRITE UR QL STRIP: NEGATIVE
PH UR STRIP.AUTO: 7.5 [PH] (ref 5–8)
PH UR STRIP.AUTO: 7.5 [PH] (ref 5–8)
PLATELET # BLD AUTO: 162 10*3/MM3 (ref 150–450)
PMV BLD AUTO: 11.1 FL (ref 6–12)
POTASSIUM BLD-SCNC: 4.7 MMOL/L (ref 3.5–5.5)
PROCALCITONIN SERPL-MCNC: 1.45 NG/ML
PROT SERPL-MCNC: 5.9 G/DL (ref 5.7–8.2)
PROT UR QL STRIP: NEGATIVE
PROT UR QL STRIP: NEGATIVE
RBC # BLD AUTO: 3.73 10*6/MM3 (ref 3.89–5.14)
SODIUM BLD-SCNC: 139 MMOL/L (ref 132–146)
SP GR UR STRIP: 1.01 (ref 1–1.03)
SP GR UR STRIP: 1.02 (ref 1–1.03)
TROPONIN I SERPL-MCNC: 0.01 NG/ML (ref 0–0.07)
UROBILINOGEN UR QL STRIP: NORMAL
UROBILINOGEN UR QL STRIP: NORMAL
WBC NRBC COR # BLD: 17.66 10*3/MM3 (ref 3.5–10.8)
WHOLE BLOOD HOLD SPECIMEN: NORMAL
WHOLE BLOOD HOLD SPECIMEN: NORMAL

## 2017-06-05 PROCEDURE — 25010000002 PIPERACILLIN-TAZOBACTAM: Performed by: EMERGENCY MEDICINE

## 2017-06-05 PROCEDURE — 96365 THER/PROPH/DIAG IV INF INIT: CPT

## 2017-06-05 PROCEDURE — 86140 C-REACTIVE PROTEIN: CPT | Performed by: EMERGENCY MEDICINE

## 2017-06-05 PROCEDURE — G0378 HOSPITAL OBSERVATION PER HR: HCPCS

## 2017-06-05 PROCEDURE — 25010000002 PIPERACILLIN-TAZOBACTAM: Performed by: INTERNAL MEDICINE

## 2017-06-05 PROCEDURE — 99285 EMERGENCY DEPT VISIT HI MDM: CPT

## 2017-06-05 PROCEDURE — 71250 CT THORAX DX C-: CPT

## 2017-06-05 PROCEDURE — 99220 PR INITIAL OBSERVATION CARE/DAY 70 MINUTES: CPT | Performed by: INTERNAL MEDICINE

## 2017-06-05 PROCEDURE — 81003 URINALYSIS AUTO W/O SCOPE: CPT | Performed by: EMERGENCY MEDICINE

## 2017-06-05 PROCEDURE — 83605 ASSAY OF LACTIC ACID: CPT | Performed by: EMERGENCY MEDICINE

## 2017-06-05 PROCEDURE — 71010 HC CHEST PA OR AP: CPT

## 2017-06-05 PROCEDURE — 96367 TX/PROPH/DG ADDL SEQ IV INF: CPT

## 2017-06-05 PROCEDURE — 96372 THER/PROPH/DIAG INJ SC/IM: CPT

## 2017-06-05 PROCEDURE — 96366 THER/PROPH/DIAG IV INF ADDON: CPT

## 2017-06-05 PROCEDURE — 93005 ELECTROCARDIOGRAM TRACING: CPT | Performed by: EMERGENCY MEDICINE

## 2017-06-05 PROCEDURE — 83605 ASSAY OF LACTIC ACID: CPT | Performed by: INTERNAL MEDICINE

## 2017-06-05 PROCEDURE — 85025 COMPLETE CBC W/AUTO DIFF WBC: CPT | Performed by: EMERGENCY MEDICINE

## 2017-06-05 PROCEDURE — 84484 ASSAY OF TROPONIN QUANT: CPT

## 2017-06-05 PROCEDURE — 25010000002 VANCOMYCIN: Performed by: EMERGENCY MEDICINE

## 2017-06-05 PROCEDURE — 81003 URINALYSIS AUTO W/O SCOPE: CPT | Performed by: INTERNAL MEDICINE

## 2017-06-05 PROCEDURE — 74176 CT ABD & PELVIS W/O CONTRAST: CPT

## 2017-06-05 PROCEDURE — 25010000002 HEPARIN (PORCINE) PER 1000 UNITS: Performed by: INTERNAL MEDICINE

## 2017-06-05 PROCEDURE — 51702 INSERT TEMP BLADDER CATH: CPT

## 2017-06-05 PROCEDURE — 84145 PROCALCITONIN (PCT): CPT | Performed by: EMERGENCY MEDICINE

## 2017-06-05 PROCEDURE — 83735 ASSAY OF MAGNESIUM: CPT | Performed by: EMERGENCY MEDICINE

## 2017-06-05 PROCEDURE — 70551 MRI BRAIN STEM W/O DYE: CPT

## 2017-06-05 PROCEDURE — 85652 RBC SED RATE AUTOMATED: CPT | Performed by: EMERGENCY MEDICINE

## 2017-06-05 PROCEDURE — 87040 BLOOD CULTURE FOR BACTERIA: CPT | Performed by: EMERGENCY MEDICINE

## 2017-06-05 PROCEDURE — 80053 COMPREHEN METABOLIC PANEL: CPT | Performed by: EMERGENCY MEDICINE

## 2017-06-05 RX ORDER — VANCOMYCIN HYDROCHLORIDE 1 G/200ML
1000 INJECTION, SOLUTION INTRAVENOUS EVERY 24 HOURS
Status: DISCONTINUED | OUTPATIENT
Start: 2017-06-06 | End: 2017-06-07

## 2017-06-05 RX ORDER — LEVOTHYROXINE SODIUM 0.07 MG/1
75 TABLET ORAL
Status: DISCONTINUED | OUTPATIENT
Start: 2017-06-06 | End: 2017-06-09 | Stop reason: HOSPADM

## 2017-06-05 RX ORDER — LISINOPRIL 10 MG/1
10 TABLET ORAL DAILY
Status: DISCONTINUED | OUTPATIENT
Start: 2017-06-05 | End: 2017-06-09 | Stop reason: HOSPADM

## 2017-06-05 RX ORDER — CASTOR OIL AND BALSAM, PERU 788; 87 MG/G; MG/G
OINTMENT TOPICAL EVERY 12 HOURS SCHEDULED
Status: DISCONTINUED | OUTPATIENT
Start: 2017-06-05 | End: 2017-06-09 | Stop reason: HOSPADM

## 2017-06-05 RX ORDER — ONDANSETRON 2 MG/ML
4 INJECTION INTRAMUSCULAR; INTRAVENOUS EVERY 6 HOURS PRN
Status: DISCONTINUED | OUTPATIENT
Start: 2017-06-05 | End: 2017-06-09 | Stop reason: HOSPADM

## 2017-06-05 RX ORDER — SODIUM CHLORIDE 0.9 % (FLUSH) 0.9 %
1-10 SYRINGE (ML) INJECTION AS NEEDED
Status: DISCONTINUED | OUTPATIENT
Start: 2017-06-05 | End: 2017-06-09 | Stop reason: HOSPADM

## 2017-06-05 RX ORDER — SODIUM CHLORIDE 9 MG/ML
100 INJECTION, SOLUTION INTRAVENOUS CONTINUOUS
Status: DISCONTINUED | OUTPATIENT
Start: 2017-06-05 | End: 2017-06-09 | Stop reason: HOSPADM

## 2017-06-05 RX ORDER — BISACODYL 10 MG
10 SUPPOSITORY, RECTAL RECTAL ONCE
Status: COMPLETED | OUTPATIENT
Start: 2017-06-05 | End: 2017-06-05

## 2017-06-05 RX ORDER — DOCUSATE SODIUM 100 MG/1
100 CAPSULE, LIQUID FILLED ORAL 2 TIMES DAILY
Status: DISCONTINUED | OUTPATIENT
Start: 2017-06-05 | End: 2017-06-09 | Stop reason: HOSPADM

## 2017-06-05 RX ORDER — HEPARIN SODIUM 5000 [USP'U]/ML
5000 INJECTION, SOLUTION INTRAVENOUS; SUBCUTANEOUS EVERY 8 HOURS SCHEDULED
Status: DISCONTINUED | OUTPATIENT
Start: 2017-06-05 | End: 2017-06-09 | Stop reason: HOSPADM

## 2017-06-05 RX ORDER — ATORVASTATIN CALCIUM 10 MG/1
10 TABLET, FILM COATED ORAL NIGHTLY
Status: DISCONTINUED | OUTPATIENT
Start: 2017-06-05 | End: 2017-06-09 | Stop reason: HOSPADM

## 2017-06-05 RX ORDER — PANTOPRAZOLE SODIUM 40 MG/1
40 TABLET, DELAYED RELEASE ORAL
Status: DISCONTINUED | OUTPATIENT
Start: 2017-06-06 | End: 2017-06-09 | Stop reason: HOSPADM

## 2017-06-05 RX ORDER — ACETAMINOPHEN 325 MG/1
650 TABLET ORAL EVERY 4 HOURS PRN
Status: DISCONTINUED | OUTPATIENT
Start: 2017-06-05 | End: 2017-06-05 | Stop reason: SDUPTHER

## 2017-06-05 RX ORDER — AMLODIPINE BESYLATE 5 MG/1
5 TABLET ORAL
Status: DISCONTINUED | OUTPATIENT
Start: 2017-06-05 | End: 2017-06-09 | Stop reason: HOSPADM

## 2017-06-05 RX ORDER — GABAPENTIN 300 MG/1
300 CAPSULE ORAL EVERY 8 HOURS SCHEDULED
Status: DISCONTINUED | OUTPATIENT
Start: 2017-06-05 | End: 2017-06-09 | Stop reason: HOSPADM

## 2017-06-05 RX ORDER — ACETAMINOPHEN 500 MG
500 TABLET ORAL EVERY 6 HOURS PRN
COMMUNITY

## 2017-06-05 RX ORDER — ASPIRIN 81 MG/1
81 TABLET, CHEWABLE ORAL DAILY
Status: DISCONTINUED | OUTPATIENT
Start: 2017-06-05 | End: 2017-06-09 | Stop reason: HOSPADM

## 2017-06-05 RX ORDER — ACETAMINOPHEN 500 MG
500 TABLET ORAL EVERY 6 HOURS PRN
Status: DISCONTINUED | OUTPATIENT
Start: 2017-06-05 | End: 2017-06-09 | Stop reason: HOSPADM

## 2017-06-05 RX ORDER — ALLOPURINOL 100 MG/1
200 TABLET ORAL DAILY
Status: DISCONTINUED | OUTPATIENT
Start: 2017-06-05 | End: 2017-06-09 | Stop reason: HOSPADM

## 2017-06-05 RX ORDER — SODIUM CHLORIDE 0.9 % (FLUSH) 0.9 %
10 SYRINGE (ML) INJECTION AS NEEDED
Status: DISCONTINUED | OUTPATIENT
Start: 2017-06-05 | End: 2017-06-05 | Stop reason: SDUPTHER

## 2017-06-05 RX ORDER — LISINOPRIL 10 MG/1
10 TABLET ORAL DAILY
COMMUNITY

## 2017-06-05 RX ORDER — PANTOPRAZOLE SODIUM 20 MG/1
20 TABLET, DELAYED RELEASE ORAL EVERY MORNING
COMMUNITY

## 2017-06-05 RX ORDER — BACLOFEN 10 MG/1
10 TABLET ORAL EVERY 6 HOURS SCHEDULED
Status: DISCONTINUED | OUTPATIENT
Start: 2017-06-05 | End: 2017-06-09 | Stop reason: HOSPADM

## 2017-06-05 RX ORDER — ATORVASTATIN CALCIUM 10 MG/1
10 TABLET, FILM COATED ORAL NIGHTLY
COMMUNITY

## 2017-06-05 RX ORDER — COLCHICINE 0.6 MG/1
0.6 TABLET ORAL DAILY
Status: DISCONTINUED | OUTPATIENT
Start: 2017-06-05 | End: 2017-06-09 | Stop reason: HOSPADM

## 2017-06-05 RX ORDER — BENAZEPRIL HYDROCHLORIDE 5 MG/1
10 TABLET, FILM COATED ORAL DAILY
Status: DISCONTINUED | OUTPATIENT
Start: 2017-06-05 | End: 2017-06-09 | Stop reason: HOSPADM

## 2017-06-05 RX ORDER — ONDANSETRON 4 MG/1
4 TABLET, FILM COATED ORAL EVERY 6 HOURS PRN
Status: DISCONTINUED | OUTPATIENT
Start: 2017-06-05 | End: 2017-06-09 | Stop reason: HOSPADM

## 2017-06-05 RX ADMIN — ALLOPURINOL 200 MG: 100 TABLET ORAL at 18:01

## 2017-06-05 RX ADMIN — PIPERACILLIN SODIUM,TAZOBACTAM SODIUM 3.38 G: 3; .375 INJECTION, POWDER, FOR SOLUTION INTRAVENOUS at 15:42

## 2017-06-05 RX ADMIN — BISACODYL 10 MG: 10 SUPPOSITORY RECTAL at 17:00

## 2017-06-05 RX ADMIN — COLCHICINE 0.6 MG: 0.6 TABLET, FILM COATED ORAL at 18:02

## 2017-06-05 RX ADMIN — BENAZEPRIL HYDROCHLORIDE 10 MG: 5 TABLET, COATED ORAL at 18:02

## 2017-06-05 RX ADMIN — GABAPENTIN 300 MG: 300 CAPSULE ORAL at 21:34

## 2017-06-05 RX ADMIN — ATORVASTATIN CALCIUM 10 MG: 10 TABLET, FILM COATED ORAL at 21:34

## 2017-06-05 RX ADMIN — PIPERACILLIN SODIUM,TAZOBACTAM SODIUM 3.38 G: 3; .375 INJECTION, POWDER, FOR SOLUTION INTRAVENOUS at 21:34

## 2017-06-05 RX ADMIN — BACLOFEN 10 MG: 10 TABLET ORAL at 23:26

## 2017-06-05 RX ADMIN — CASTOR OIL AND BALSAM, PERU: 788; 87 OINTMENT TOPICAL at 21:34

## 2017-06-05 RX ADMIN — HEPARIN SODIUM 5000 UNITS: 5000 INJECTION, SOLUTION INTRAVENOUS; SUBCUTANEOUS at 21:34

## 2017-06-05 RX ADMIN — ASPIRIN 81 MG 81 MG: 81 TABLET ORAL at 18:02

## 2017-06-05 RX ADMIN — VANCOMYCIN HYDROCHLORIDE 1500 MG: 1 INJECTION, POWDER, LYOPHILIZED, FOR SOLUTION INTRAVENOUS at 10:57

## 2017-06-05 RX ADMIN — HEPARIN SODIUM 5000 UNITS: 5000 INJECTION, SOLUTION INTRAVENOUS; SUBCUTANEOUS at 17:00

## 2017-06-05 RX ADMIN — AMLODIPINE BESYLATE 5 MG: 5 TABLET ORAL at 18:01

## 2017-06-05 RX ADMIN — BACLOFEN 10 MG: 10 TABLET ORAL at 18:01

## 2017-06-05 RX ADMIN — TAZOBACTAM SODIUM AND PIPERACILLIN SODIUM 4.5 G: .5; 4 INJECTION, POWDER, LYOPHILIZED, FOR SOLUTION INTRAVENOUS at 10:24

## 2017-06-05 RX ADMIN — DOCUSATE SODIUM 100 MG: 100 CAPSULE, LIQUID FILLED ORAL at 18:02

## 2017-06-05 RX ADMIN — SODIUM CHLORIDE 2178 ML: 9 INJECTION, SOLUTION INTRAVENOUS at 10:18

## 2017-06-05 RX ADMIN — SODIUM CHLORIDE 100 ML/HR: 9 INJECTION, SOLUTION INTRAVENOUS at 15:42

## 2017-06-05 RX ADMIN — LISINOPRIL 10 MG: 10 TABLET ORAL at 18:01

## 2017-06-05 NOTE — ED PROVIDER NOTES
Subjective   HPI Comments: Mrs. Katherin Ta is a 82 y.o. female who presents to the ED with c/o neurologic complains. Per nursing note pt was tranpsported by EMS from [facility] due to mental status changes. [facility] reports of right facial droop, right arm weakness, and chronic right leg weakness. On EMS arrival, they note she had pinpoint pupils, and decreased respiratory rate. EMS gave the pt narcan and the pt aroused back. History is limited due to the pt's mental status changes.     Patient is a 82 y.o. female presenting with neurologic complaint.   History provided by:  Patient  History limited by:  Mental status change  Neurologic Problem   The patient's primary symptoms include an altered mental status and focal weakness. This is a new problem. The current episode started today. The neurological problem developed suddenly. Last Known Well Instant: Uknown.  The problem is unchanged. There was facial, right-sided, lower extremity and upper extremity focality noted. Treatments tried: Narcan. The treatment provided moderate relief.       Review of Systems   Unable to perform ROS: Mental status change   Neurological: Positive for focal weakness.       Past Medical History:   Diagnosis Date   • GERD (gastroesophageal reflux disease) 5/11/2017   • Gout    • Hyperlipidemia 5/11/2017   • Hypertension 5/11/2017   • Hypothyroid 5/11/2017   • Rheumatoid arthritis    • Spinal stenosis    10:02 AM  Discharge summary from 5/16/17:  Presenting Problem/History of Present Illness  Low back pain, unspecified back pain laterality, unspecified chronicity, with sciatica presence unspecified [M54.5]         History of Present Illness on Day of Discharge: Patient has been up this morning getting a shower. She states she slept well last night and her pain is controlled. She is ready for rehab. She has not had any incontinence episodes for the last two days.      Hospital Course  Patient is a 82 y.o. female with a PMH Of  hypothyroidism, HTN, RA, HLD, and GERD. Patient presented to [facility] with complaints of two days of intractable lower back pain. Pain is constant and worse with movement and radiates down lower extremities bilaterally. She has bilateral lower extremity weakness Which had gotten worse with recurrent falls. She also had acute loss of bowel and bladder control.      In Ed patient was  Hypertensive, /84, CT lumbar spine demonstrates; Extensive chronic lumbar degenerative and postsurgical changes. But no acute process. CT pelvis negative for acute fracture. UA showed small leuks, and no bacteria.      Patient was admitted to hospital medicine and neurosurgery was consulted. MRI showed There is extreme multilevel degenerative disc and arthropathic disease with marked and extreme multifocal levels of critical central and lateral recess impingement stenosis. Neurosurgery did not recommend any surgical intervention and felt physical therapy would benefit patient more. Patient has a pain pump and has had adequate pain control. Patient was agreeable to rehab. Patient has some hypertensive episodes and norvasc was added. Blood pressure has normalized and she may not need to go home on Norvasc. She will need to have her blood pressure monitored while in rehab.  She has remained stable and remained in hospital pending acceptance to rehab.     She will be discharged to [facility] today and will be transported by her family. She will need to follow up with her PCP within one week of d/c from rehab. She needs to follow up with her neurosurgeon per their recommendations.   No Known Allergies    Past Surgical History:   Procedure Laterality Date   • AMPUTATION DIGIT Right     right middle toe   • BACK SURGERY     • EYE SURGERY     • HAND SURGERY     • JOINT REPLACEMENT      left knee   • PERICARDIAL WINDOW         Family History   Problem Relation Age of Onset   • Hypertension Mother    • Arthritis Mother    • Heart attack Father         Social History     Social History   • Marital status:      Spouse name: N/A   • Number of children: N/A   • Years of education: N/A     Social History Main Topics   • Smoking status: Never Smoker   • Smokeless tobacco: None   • Alcohol use No   • Drug use: No   • Sexual activity: Not Asked     Other Topics Concern   • None     Social History Narrative         Objective   Physical Exam   Constitutional: She appears well-developed and well-nourished. No distress.   HENT:   Head: Normocephalic and atraumatic.   Been out in the sun with sunglasses on   Eyes: Conjunctivae are normal.   Neck: Normal range of motion. Neck supple. Carotid bruit is not present.   Cardiovascular: Normal rate, regular rhythm, normal heart sounds and intact distal pulses.    Pulmonary/Chest: Effort normal and breath sounds normal. No respiratory distress.   Abdominal: Soft. Bowel sounds are normal. There is no tenderness.   Pain pump in left intraabdoman   Musculoskeletal: Normal range of motion. She exhibits edema (Ankles).   Upper extremities: no active synovitis   Neurological: She is alert. She has normal strength.   Lethargic when I examined the pt and would make mild sounds I could not discern but was oriented to nurses before. Voice soft, face symmetric. I could not see a facial droop. Weak equal  strengths bilaterally, and weak legs bilaterally, could not lift against gravity.    Skin: Skin is warm and dry.   Psychiatric: She has a normal mood and affect. Her behavior is normal.   Nursing note and vitals reviewed.      Critical Care  Performed by: TIM LEE  Authorized by: TIM LEE   Total critical care time: 40 minutes  Critical care time was exclusive of separately billable procedures and treating other patients.  Critical care was necessary to treat or prevent imminent or life-threatening deterioration of the following conditions: CNS failure or compromise.  Critical care was time spent personally by  me on the following activities: discussions with consultants, evaluation of patient's response to treatment, obtaining history from patient or surrogate, ordering and review of laboratory studies, pulse oximetry, re-evaluation of patient's condition, ordering and review of radiographic studies, ordering and performing treatments and interventions, examination of patient, review of old charts and development of treatment plan with patient or surrogate.  Comments: Multiple re-evaluations, serial examinations, consults with hospitalist, decision to admit.                ED Course  ED Course   Comment By Time   Discussed case with Dr. Warren, [facility] medicine, who will admit the pt. -ER Nicole Francis 06/05 1136     Recent Results (from the past 24 hour(s))   Comprehensive Metabolic Panel    Collection Time: 06/05/17 10:12 AM   Result Value Ref Range    Glucose 102 (H) 70 - 100 mg/dL    BUN 21 9 - 23 mg/dL    Creatinine 0.70 0.60 - 1.30 mg/dL    Sodium 139 132 - 146 mmol/L    Potassium 4.7 3.5 - 5.5 mmol/L    Chloride 103 99 - 109 mmol/L    CO2 29.0 20.0 - 31.0 mmol/L    Calcium 9.8 8.7 - 10.4 mg/dL    Total Protein 5.9 5.7 - 8.2 g/dL    Albumin 3.80 3.20 - 4.80 g/dL    ALT (SGPT) 31 7 - 40 U/L    AST (SGOT) 24 0 - 33 U/L    Alkaline Phosphatase 107 (H) 25 - 100 U/L    Total Bilirubin 0.7 0.3 - 1.2 mg/dL    eGFR Non African Amer 80 >60 mL/min/1.73    Globulin 2.1 gm/dL    A/G Ratio 1.8 1.5 - 2.5 g/dL    BUN/Creatinine Ratio 30.0 (H) 7.0 - 25.0    Anion Gap 7.0 3.0 - 11.0 mmol/L   Urinalysis With / Culture If Indicated    Collection Time: 06/05/17 10:12 AM   Result Value Ref Range    Color, UA Yellow Yellow, Straw    Appearance, UA Clear Clear    pH, UA 7.5 5.0 - 8.0    Specific Gravity, UA 1.017 1.001 - 1.030    Glucose, UA Negative Negative    Ketones, UA Negative Negative    Bilirubin, UA Negative Negative    Blood, UA Negative Negative    Protein, UA Negative Negative    Leuk Esterase, UA Negative Negative    Nitrite,  UA Negative Negative    Urobilinogen, UA 0.2 E.U./dL 0.2 - 1.0 E.U./dL   Light Blue Top    Collection Time: 06/05/17 10:12 AM   Result Value Ref Range    Extra Tube hold for add-on    Lavender Top    Collection Time: 06/05/17 10:12 AM   Result Value Ref Range    Extra Tube hold for add-on    Gold Top - SST    Collection Time: 06/05/17 10:12 AM   Result Value Ref Range    Extra Tube Hold for add-ons.    CBC Auto Differential    Collection Time: 06/05/17 10:12 AM   Result Value Ref Range    WBC 17.66 (H) 3.50 - 10.80 10*3/mm3    RBC 3.73 (L) 3.89 - 5.14 10*6/mm3    Hemoglobin 12.5 11.5 - 15.5 g/dL    Hematocrit 38.6 34.5 - 44.0 %    .5 (H) 80.0 - 99.0 fL    MCH 33.5 (H) 27.0 - 31.0 pg    MCHC 32.4 32.0 - 36.0 g/dL    RDW 15.6 (H) 11.3 - 14.5 %    RDW-SD 59.0 (H) 37.0 - 54.0 fl    MPV 11.1 6.0 - 12.0 fL    Platelets 162 150 - 450 10*3/mm3    Neutrophil % 72.9 (H) 41.0 - 71.0 %    Lymphocyte % 18.0 (L) 24.0 - 44.0 %    Monocyte % 8.6 0.0 - 12.0 %    Eosinophil % 0.1 0.0 - 3.0 %    Basophil % 0.1 0.0 - 1.0 %    Immature Grans % 0.3 0.0 - 0.6 %    Neutrophils, Absolute 12.89 (H) 1.50 - 8.30 10*3/mm3    Lymphocytes, Absolute 3.17 0.60 - 4.80 10*3/mm3    Monocytes, Absolute 1.51 (H) 0.00 - 1.00 10*3/mm3    Eosinophils, Absolute 0.02 (L) 0.10 - 0.30 10*3/mm3    Basophils, Absolute 0.02 0.00 - 0.20 10*3/mm3    Immature Grans, Absolute 0.05 (H) 0.00 - 0.03 10*3/mm3   C-reactive Protein    Collection Time: 06/05/17 10:12 AM   Result Value Ref Range    C-Reactive Protein 7.75 (H) 0.00 - 1.00 mg/dL   Sedimentation Rate    Collection Time: 06/05/17 10:12 AM   Result Value Ref Range    Sed Rate 26 0 - 30 mm/hr   Procalcitonin    Collection Time: 06/05/17 10:12 AM   Result Value Ref Range    Procalcitonin 1.45 ng/mL   Lactic Acid, Plasma    Collection Time: 06/05/17 10:12 AM   Result Value Ref Range    Lactate 1.6 0.5 - 2.0 mmol/L   Magnesium    Collection Time: 06/05/17 10:12 AM   Result Value Ref Range    Magnesium 1.9 1.3  "- 2.7 mg/dL   POC Troponin, Rapid    Collection Time: 06/05/17 10:30 AM   Result Value Ref Range    Troponin I 0.01 0.00 - 0.07 ng/mL     Note: In addition to lab results from this visit, the labs listed above may include labs taken at another facility or during a different encounter within the last 24 hours. Please correlate lab times with ED admission and discharge times for further clarification of the services performed during this visit.    MRI Brain Without Contrast   Preliminary Result   Atrophy of the brain with some chronic small vessel ischemic   changes seen in the periventricular and subcortical white matter. No   acute intracranial abnormality is identified.       D:  06/05/2017   E:  06/05/2017               XR Chest 1 View   Preliminary Result   Heart is enlarged with prominence of the pulmonary   vascularity bilaterally. Lung volumes are low.       D:  06/05/2017   E:  06/05/2017              CT Abdomen Pelvis Without Contrast    (Results Pending)   CT Chest Without Contrast    (Results Pending)     Vitals:    06/05/17 0949 06/05/17 1100 06/05/17 1147   BP: 142/65 140/71 141/88   BP Location: Right arm     Patient Position: Lying     Pulse: 86 79 77   Resp: 16     Temp: (!) 101 °F (38.3 °C)     TempSrc: Rectal     SpO2: 92% 93% 98%   Weight: 160 lb (72.6 kg)     Height: 62\" (157.5 cm)       Medications   sodium chloride 0.9 % flush 10 mL (not administered)   sodium chloride 0.9 % bolus 2,178 mL (0 mL/kg × 72.6 kg Intravenous Stopped 6/5/17 1222)   piperacillin-tazobactam (ZOSYN) 4.5 g/100 mL 0.9% NS IVPB (mbp) (0 g Intravenous Stopped 6/5/17 1056)   vancomycin 1500 mg/500 mL 0.9% NS IVPB ([facility]) (0 mg/kg × 72.6 kg Intravenous Stopped 6/5/17 1307)     ECG/EMG Results (last 24 hours)     Procedure Component Value Units Date/Time    ECG 12 Lead [[id number]] Collected:  06/05/17 0955     Updated:  06/05/17 0956                          MDM  Number of Diagnoses or Management Options  Sepsis, due to " unspecified organism:   Somnolence:   Diagnosis management comments:       I reviewed all available studies at the bedside with the patient's family.  The patient remains lethargic but still has a nonfocal neurologic exam.    She did have a fever on rectal temperature and I suspect sepsis is the underlying cause of her decline but the source of sepsis is unclear.  Her chest x-ray is equivocal and her urine is clear.    I do think she needs and MRI of the brain to make sure she hasn't had a stroke but given her pain pump this is little bit problematic since that she had an MRI of her lumbar spine with the pain pump that apparently the settings need to be checked on this.    After discussion with Dr. Villa, on-call medicine, she will admit the patient but asked that we get a CAT scan of the chest and abdomen and pelvis is stable and find sources of infection and I've ordered this.  The patient received IV Zosyn and vancomycin and fluid boluses.    All are agreeable with the plan       Amount and/or Complexity of Data Reviewed  Clinical lab tests: reviewed  Tests in the radiology section of CPT®: reviewed  Tests in the medicine section of CPT®: reviewed  Decide to obtain previous medical records or to obtain history from someone other than the patient: yes        Final diagnoses:   Somnolence   Sepsis, due to unspecified organism   EMR Dragon/Transcription disclaimer:  Much of this encounter note is an electronic transcription/translation of spoken language to printed text. The electronic translation of spoken language may permit erroneous, or at times, nonsensical words or phrases to be inadvertently transcribed; Although I have reviewed the note for such errors, some may still exist.      Documentation assistance provided by ada DAVILA.  Information recorded by the ada was done at my direction and has been verified and validated by me.     Nicole Davila  06/05/17 1004       Nicole Davila  06/05/17 1229       Nicole  Tito  06/05/17 1236       Beto Smith MD  06/05/17 3948

## 2017-06-05 NOTE — PLAN OF CARE
Problem: Patient Care Overview (Adult)  Goal: Plan of Care Review  Outcome: Ongoing (interventions implemented as appropriate)  Goal: Adult Individualization and Mutuality  Outcome: Ongoing (interventions implemented as appropriate)  Goal: Discharge Needs Assessment  Outcome: Ongoing (interventions implemented as appropriate)    Problem: Pain, Acute (Adult)  Goal: Identify Related Risk Factors and Signs and Symptoms  Outcome: Ongoing (interventions implemented as appropriate)  Goal: Acceptable Pain Control/Comfort Level  Outcome: Ongoing (interventions implemented as appropriate)    Problem: Confusion, Acute (Adult)  Goal: Identify Related Risk Factors and Signs and Symptoms  Outcome: Ongoing (interventions implemented as appropriate)  Goal: Cognitive/Functional Impairments Minimized  Outcome: Ongoing (interventions implemented as appropriate)  Goal: Safety  Outcome: Ongoing (interventions implemented as appropriate)

## 2017-06-05 NOTE — PROGRESS NOTES
Pharmacy consult to dose Vancomycin for sepsis.  Pt 81 YO, 72.6 kg, and CrCl ~ 51 ml/min.  The patient received a loading dose of 1500 mg IV once in the ED.  The patient was ordered 1 G IV every 24 hours.  A trough will be drawn prior to the 3rd dose with a goal of 15-20.    Augustus Borrego RPH  6/5/2017  3:54 PM

## 2017-06-05 NOTE — H&P
[facility] Medicine Services  HISTORY AND PHYSICAL    Primary Care Physician: Huang Carnes MD    Subjective     Chief Complaint:  Lethargy, R-Sided Weakness    History of Present Illness:   This Lighthart is an 82-year-old  female who presents to [facility] ED this afternoon via EMS from [facility] with complaints of right-sided facial droop, right arm weakness, and chronic right leg weakness.  Patient family is also at the bedside to provide history and report that she has been doing very well at [facility] without issue until this morning.  Patient tells me that this morning that staff had difficulty waking her up.  Her daughter also reports that she had difficulty getting her to arouse. She has been feeling fine throughout the past few days without issue other than constipation, which she has been struggling with since her last admission. She denies any fevers or chills. No headaches, dizziness, or SOA. No CP or palpitations. No difficulty with swallowing. She denies any difficulty urinating, no blood to note in urine. No abdominal pain.     Of note, the patient was recently admitted from 05/10/2017-05/16/2017 for intractable back pain. She was evaluated by neurosurgery and was not a surgical candidate at that time. She has been at [facility] since discharge and was to have a family meeting today to plan discharge and home needs. She also has a morphine pain pump which she has had for quite some time now.     ED work-up revealed troponin 0.01, K+ 4.7, Cr 0.70 (baseline), CRP 7.75, magnesium 1.9. Lactic 1.6, WBC 17.66, procalcitionin 1.45. Blood cultures pending. Urinalysis unremarkable. MRI Brain wo reveals atrophy of the brain, no acute intracranial abnormality. CT chest wo shows atelectatic changes bilaterally. CT abd/pelvis wo reveals stranding identified surrounding abnormal wall thickening of the ascending colon. Findings suggesting  either focal colitis or diverticulitis. No abscess collection or free air. She will be admitted at this time to [facility] under Hospital Medicine Services for further evaluation and treatment.       Review of Systems   Constitutional: Positive for activity change, fatigue and fever. Negative for appetite change and chills.   HENT: Negative.    Eyes: Negative for photophobia and visual disturbance.   Respiratory: Negative for choking, shortness of breath and wheezing.    Cardiovascular: Negative for chest pain, palpitations and leg swelling.   Gastrointestinal: Positive for constipation. Negative for abdominal pain, diarrhea, nausea and vomiting.   Genitourinary: Positive for difficulty urinating. Negative for dysuria.   Musculoskeletal: Negative for arthralgias, back pain, gait problem, joint swelling and myalgias.   Skin: Positive for wound.   Neurological: Positive for tremors, facial asymmetry and weakness. Negative for dizziness, syncope, light-headedness and headaches.   Psychiatric/Behavioral: Negative.    Otherwise complete ROS performed and negative except as mentioned in the HPI.    Past Medical History:   Diagnosis Date   • GERD (gastroesophageal reflux disease) 5/11/2017   • Gout    • Hyperlipidemia 5/11/2017   • Hypertension 5/11/2017   • Hypothyroid 5/11/2017   • Rheumatoid arthritis    • Spinal stenosis        Past Surgical History:   Procedure Laterality Date   • AMPUTATION DIGIT Right     right middle toe   • BACK SURGERY     • EYE SURGERY     • HAND SURGERY     • JOINT REPLACEMENT      left knee   • PERICARDIAL WINDOW         Family History   Problem Relation Age of Onset   • Hypertension Mother    • Arthritis Mother    • Heart attack Father        Social History     Social History   • Marital status:      Spouse name: N/A   • Number of children: N/A   • Years of education: N/A     Occupational History   • Not on file.     Social History Main Topics   • Smoking status: Never Smoker   • Smokeless  tobacco: Not on file   • Alcohol use No   • Drug use: No   • Sexual activity: Not on file     Other Topics Concern   • Not on file     Social History Narrative       Medications:  Prescriptions Prior to Admission   Medication Sig Dispense Refill Last Dose   • acetaminophen (TYLENOL) 500 MG tablet Take 500 mg by mouth Every 6 (Six) Hours As Needed for Mild Pain (1-3).      • atorvastatin (LIPITOR) 10 MG tablet Take 10 mg by mouth Every Night.      • lisinopril (PRINIVIL,ZESTRIL) 10 MG tablet Take 10 mg by mouth Daily.      • pantoprazole (PROTONIX) 20 MG EC tablet Take 20 mg by mouth Every Morning.      • allopurinol (ZYLOPRIM) 100 MG tablet Take 200 mg by mouth Daily.      • amLODIPine (NORVASC) 5 MG tablet Take 1 tablet by mouth Daily.      • Apoaequorin (PREVAGEN PO) Take 1 tablet by mouth Daily.      • aspirin 81 MG tablet Take 81 mg by mouth Daily.      • baclofen (LIORESAL) 10 MG tablet Take 10 mg by mouth 4 (Four) Times a Day.      • benazepril (LOTENSIN) 10 MG tablet Take 10 mg by mouth Daily.      • castor oil-balsam peru (VENELEX) ointment Apply 1 application topically Every 12 (Twelve) Hours.      • cetirizine (zyrTEC) 10 MG tablet Take 1 tablet by mouth Daily.      • colchicine 0.6 MG tablet Take 0.6 mg by mouth Daily.      • docusate sodium (COLACE) 100 MG capsule Take 100 mg by mouth 2 (Two) Times a Day.      • gabapentin (NEURONTIN) 800 MG tablet Take 1,600 mg by mouth Every 6 (Six) Hours.      • levothyroxine (SYNTHROID, LEVOTHROID) 75 MCG tablet Take 75 mcg by mouth Daily.      • melatonin 5 MG sublingual tablet sublingual tablet Place 1 tablet under the tongue At Night As Needed (sleep).  0    • MORPHINE SULFATE IV Infuse 2.399 mg into a venous catheter Daily. MORPHINE PUMP 2.399 MG PER DAY  IMPLANT INTERNAL LEFT ABDOMEN      • Multiple Vitamin (MULTI VITAMIN PO) Take 1 tablet by mouth Daily.      • Omega-3 Fatty Acids (FISH OIL) 1000 MG capsule capsule Take 1,000 mg by mouth Daily With Breakfast.    "      Reviewed and reconciled on admission.     Allergies:  No Known Allergies      Objective     Physical Exam:  Vital Signs: /66 (BP Location: Right arm, Patient Position: Lying)  Pulse 75 Comment: Simultaneous filing. User may be unaware of other data.  Temp 98.3 °F (36.8 °C) (Oral)   Resp 16  Ht 62\" (157.5 cm)  Wt 160 lb (72.6 kg)  SpO2 92%  BMI 29.26 kg/m2  Physical Exam   Gen.: Resting in bed in no acute distress, breathing without any labor and speaking without difficulty.  Neurologic exam: Awake, alert, oriented ×3.  She is in good mood and very cooperative.  Severe weakness of right lower extremity.  HEENT: PERRLA.  Neck: Supple, no cervical lymphadenopathies palpable.  Lungs: Clear to auscultation bilaterally, breathing is without labor, good air movement.  Cardiovascular: S1 and S2 present and normal, regular rate and rhythm, no murmur gallop or rub audible.  GI: Obese, Soft, nontender, not distended, bowel sounds hyperactive.  Extremities: No edema lower extremities.      Results Reviewed:    Results from last 7 days  Lab Units 06/05/17  1012   WBC 10*3/mm3 17.66*   HEMOGLOBIN g/dL 12.5   PLATELETS 10*3/mm3 162       Results from last 7 days  Lab Units 06/05/17  1012   SODIUM mmol/L 139   POTASSIUM mmol/L 4.7   TOTAL CO2 mmol/L 29.0   CREATININE mg/dL 0.70   GLUCOSE mg/dL 102*   CALCIUM mg/dL 9.8     EXAMINATION: MRI BRAIN WO CONTRAST- 06/05/2017      INDICATION: R40.0-Somnolence; A41.9-Sepsis, unspecified organism; mental  status change.      TECHNIQUE: Routine multiplanar imaging was obtained of the brain without  the administration of gadolinium contrast.      COMPARISON: NONE      FINDINGS: There is atrophy identified of the brain. Signal changes seen  scattered throughout the periventricular and subcortical white matter  suggesting chronic small vessel ischemic change. Visualized paranasal  sinuses are clear. Globes and orbits are intact. The mastoid air cells  are patent. No " cerebellopontine angle mass identified. Pituitary and  sella are unremarkable. Craniovertebral junction is preserved. No  evidence of restricted diffusion to suggest evidence of an acute  ischemic insult. Flow voids are preserved in the major intracranial  vessels.      IMPRESSION:  Atrophy of the brain with some chronic small vessel ischemic  changes seen in the periventricular and subcortical white matter. No  acute intracranial abnormality is identified.      D: 06/05/2017  E: 06/05/2017    EXAMINATION: CT ABDOMEN PELVIS WO CONTRAST-, CT CHEST WO CONTRAST-  06/05/2017      INDICATION: fever; R40.0-Somnolence; A41.9-Sepsis, unspecified organism;  focal weakness      TECHNIQUE: Multiple axial CT imaging was obtained of the chest, abdomen  and pelvis without the administration of oral or intravenous contrast.      The radiation dose reduction device was turned on for each scan per the  ALARA (As Low as Reasonably Achievable) protocol.      COMPARISON: 05/10/2017      FINDINGS:       CHEST: Thyroid is homogeneous in appearance. No mediastinal mass or  adenopathy. The cardiac chambers are within normal limits. No  pericardial effusion. Some atelectatic changes identified in the lung  bases bilaterally. The lung apices are clear. No parenchymal  consolidation, pulmonary mass or nodule present. Degenerative changes  seen within the spine. Calcification of subcarinal region suggesting old  healed granulomatous disease.      ABDOMEN: There is mild dilatation identified of the renal collecting  systems bilaterally. Distention identified of the bladder. There is mild  stranding identified of the kidneys. No renal or ureteral stone  identified. There is some stranding identified in the right flank.  Abnormal wall thickening of the ascending colon. Findings may suggest a  focal diverticulitis or colitis. There is a small diverticula identified  in this area. No definite extraluminal air. There is no abscess  collection  identified. Fluid in the right paracolic gutter. No  significant fluid in the pelvis. No gallstones identified. Diverticula  identified throughout the remainder of the colon. Pancreas is  homogeneous.      PELVIS: Mild distention of the bladder. Stool within the colon. Pelvic  organs are otherwise unremarkable. No pelvic adenopathy. No free fluid  or free air. Degenerative changes seen within the spine and pelvis.      IMPRESSION:  Atelectatic changes in lung bases bilaterally. Some  stranding identified surrounding abnormal wall thickening of the  ascending colon. Findings suggesting either focal colitis or  diverticulitis. No abscess collection or free air.      D: 06/05/2017  E: 06/05/2017  I have personally reviewed and interpreted available lab data, radiology studies and ECG obtained at time of admission.     Assessment / Plan     Assessment/Problem List:   Hospital Problem List     Somnolence        *Somnolence and difficulty to arouse this morning.  Etiology is not clear,  however symptoms have resolved.  Currently patient is awake, alert,  and oriented.    * fever and leukocytosis. No source of infection known at this point but ct of abdomin shows some fat stranding suggestive of colitis or diveticulitis.    * HTN    * HLP    * spinal stenosis. Pt was admitted recently and was evaluated by neurosurgery. Currently pt has very minimal motor function in her right LE.    * hypothyroidism.    Plan:  - Admit to telemetry  - Gentle IV hydration.  - oxygen support as needed to keep saturations >92%    -- IS every two hours   - continue vancomycin (pharmacy to dose) and zosyn.  - WOC recommendations for healing coccyx pressure ulcer   -- air bed ordered  - suppository now   -- manual disimpaction if needed  - anchor alexander catheter for acute urinary retention, after urination of approximately 350ml PT +ml   -- cath care per protocol    -- resend urinalysis from clean cath  - fall precautions  - regular diet,  encourage oral intake   - repositioning assistance every two hours  - OOB to chair for meals please   - PT consult   - repeat labs in AM     DVT prophylaxis: TEDS/ SCDS, subq heparin  Code Status: FULL   Admission Status: Patient will be admitted to Inpatient.     Plan of care and treatment discussed and developed with Dr. Alan.     Iona Ramos RN EXTENDER 06/05/17 3:40 PM  Sincerely and examined at the bedside.  Abdomen the above physical exam and assessment.  I have reviewed and edited the rest of the above to reflect my medical opinion.  I have discussed the findings and plan of care with the patient and her family at the bedside.

## 2017-06-06 LAB
ANION GAP SERPL CALCULATED.3IONS-SCNC: 5 MMOL/L (ref 3–11)
BASOPHILS # BLD AUTO: 0.01 10*3/MM3 (ref 0–0.2)
BASOPHILS NFR BLD AUTO: 0.1 % (ref 0–1)
BUN BLD-MCNC: 15 MG/DL (ref 9–23)
BUN/CREAT SERPL: 25 (ref 7–25)
CALCIUM SPEC-SCNC: 9.6 MG/DL (ref 8.7–10.4)
CHLORIDE SERPL-SCNC: 111 MMOL/L (ref 99–109)
CO2 SERPL-SCNC: 27 MMOL/L (ref 20–31)
CREAT BLD-MCNC: 0.6 MG/DL (ref 0.6–1.3)
DEPRECATED RDW RBC AUTO: 60.2 FL (ref 37–54)
EOSINOPHIL # BLD AUTO: 0.08 10*3/MM3 (ref 0.1–0.3)
EOSINOPHIL NFR BLD AUTO: 0.6 % (ref 0–3)
ERYTHROCYTE [DISTWIDTH] IN BLOOD BY AUTOMATED COUNT: 15.8 % (ref 11.3–14.5)
GFR SERPL CREATININE-BSD FRML MDRD: 96 ML/MIN/1.73
GLUCOSE BLD-MCNC: 101 MG/DL (ref 70–100)
HCT VFR BLD AUTO: 35.2 % (ref 34.5–44)
HGB BLD-MCNC: 11.2 G/DL (ref 11.5–15.5)
IMM GRANULOCYTES # BLD: 0.02 10*3/MM3 (ref 0–0.03)
IMM GRANULOCYTES NFR BLD: 0.2 % (ref 0–0.6)
LYMPHOCYTES # BLD AUTO: 2.33 10*3/MM3 (ref 0.6–4.8)
LYMPHOCYTES NFR BLD AUTO: 18 % (ref 24–44)
MCH RBC QN AUTO: 32.8 PG (ref 27–31)
MCHC RBC AUTO-ENTMCNC: 31.8 G/DL (ref 32–36)
MCV RBC AUTO: 103.2 FL (ref 80–99)
MONOCYTES # BLD AUTO: 1.14 10*3/MM3 (ref 0–1)
MONOCYTES NFR BLD AUTO: 8.8 % (ref 0–12)
NEUTROPHILS # BLD AUTO: 9.39 10*3/MM3 (ref 1.5–8.3)
NEUTROPHILS NFR BLD AUTO: 72.3 % (ref 41–71)
PLATELET # BLD AUTO: 155 10*3/MM3 (ref 150–450)
PMV BLD AUTO: 11.5 FL (ref 6–12)
POTASSIUM BLD-SCNC: 3.8 MMOL/L (ref 3.5–5.5)
RBC # BLD AUTO: 3.41 10*6/MM3 (ref 3.89–5.14)
SODIUM BLD-SCNC: 143 MMOL/L (ref 132–146)
WBC NRBC COR # BLD: 12.97 10*3/MM3 (ref 3.5–10.8)

## 2017-06-06 PROCEDURE — 25010000002 HEPARIN (PORCINE) PER 1000 UNITS: Performed by: INTERNAL MEDICINE

## 2017-06-06 PROCEDURE — 97162 PT EVAL MOD COMPLEX 30 MIN: CPT

## 2017-06-06 PROCEDURE — G0378 HOSPITAL OBSERVATION PER HR: HCPCS

## 2017-06-06 PROCEDURE — 80048 BASIC METABOLIC PNL TOTAL CA: CPT | Performed by: INTERNAL MEDICINE

## 2017-06-06 PROCEDURE — 96372 THER/PROPH/DIAG INJ SC/IM: CPT

## 2017-06-06 PROCEDURE — 85025 COMPLETE CBC W/AUTO DIFF WBC: CPT | Performed by: INTERNAL MEDICINE

## 2017-06-06 PROCEDURE — G8978 MOBILITY CURRENT STATUS: HCPCS

## 2017-06-06 PROCEDURE — 99225 PR SBSQ OBSERVATION CARE/DAY 25 MINUTES: CPT | Performed by: INTERNAL MEDICINE

## 2017-06-06 PROCEDURE — 25010000002 PIPERACILLIN-TAZOBACTAM: Performed by: INTERNAL MEDICINE

## 2017-06-06 PROCEDURE — 25010000002 VANCOMYCIN PER 500 MG: Performed by: INTERNAL MEDICINE

## 2017-06-06 PROCEDURE — G8979 MOBILITY GOAL STATUS: HCPCS

## 2017-06-06 PROCEDURE — 97110 THERAPEUTIC EXERCISES: CPT

## 2017-06-06 RX ADMIN — GABAPENTIN 300 MG: 300 CAPSULE ORAL at 16:39

## 2017-06-06 RX ADMIN — HEPARIN SODIUM 5000 UNITS: 5000 INJECTION, SOLUTION INTRAVENOUS; SUBCUTANEOUS at 16:40

## 2017-06-06 RX ADMIN — CASTOR OIL AND BALSAM, PERU: 788; 87 OINTMENT TOPICAL at 21:25

## 2017-06-06 RX ADMIN — LEVOTHYROXINE SODIUM 75 MCG: 75 TABLET ORAL at 05:53

## 2017-06-06 RX ADMIN — VANCOMYCIN HYDROCHLORIDE 1000 MG: 1 INJECTION, SOLUTION INTRAVENOUS at 11:31

## 2017-06-06 RX ADMIN — HEPARIN SODIUM 5000 UNITS: 5000 INJECTION, SOLUTION INTRAVENOUS; SUBCUTANEOUS at 05:53

## 2017-06-06 RX ADMIN — ASPIRIN 81 MG 81 MG: 81 TABLET ORAL at 08:42

## 2017-06-06 RX ADMIN — DOCUSATE SODIUM 100 MG: 100 CAPSULE, LIQUID FILLED ORAL at 17:21

## 2017-06-06 RX ADMIN — PIPERACILLIN SODIUM,TAZOBACTAM SODIUM 3.38 G: 3; .375 INJECTION, POWDER, FOR SOLUTION INTRAVENOUS at 03:36

## 2017-06-06 RX ADMIN — CASTOR OIL AND BALSAM, PERU 1 APPLICATION: 788; 87 OINTMENT TOPICAL at 08:44

## 2017-06-06 RX ADMIN — HEPARIN SODIUM 5000 UNITS: 5000 INJECTION, SOLUTION INTRAVENOUS; SUBCUTANEOUS at 11:31

## 2017-06-06 RX ADMIN — AMLODIPINE BESYLATE 5 MG: 5 TABLET ORAL at 08:42

## 2017-06-06 RX ADMIN — PIPERACILLIN SODIUM,TAZOBACTAM SODIUM 3.38 G: 3; .375 INJECTION, POWDER, FOR SOLUTION INTRAVENOUS at 09:39

## 2017-06-06 RX ADMIN — PANTOPRAZOLE SODIUM 40 MG: 40 TABLET, DELAYED RELEASE ORAL at 05:53

## 2017-06-06 RX ADMIN — ALLOPURINOL 200 MG: 100 TABLET ORAL at 08:43

## 2017-06-06 RX ADMIN — DOCUSATE SODIUM 100 MG: 100 CAPSULE, LIQUID FILLED ORAL at 08:43

## 2017-06-06 RX ADMIN — GABAPENTIN 300 MG: 300 CAPSULE ORAL at 05:53

## 2017-06-06 RX ADMIN — GABAPENTIN 300 MG: 300 CAPSULE ORAL at 21:22

## 2017-06-06 RX ADMIN — SODIUM CHLORIDE 100 ML/HR: 9 INJECTION, SOLUTION INTRAVENOUS at 01:40

## 2017-06-06 RX ADMIN — PIPERACILLIN SODIUM,TAZOBACTAM SODIUM 3.38 G: 3; .375 INJECTION, POWDER, FOR SOLUTION INTRAVENOUS at 21:22

## 2017-06-06 RX ADMIN — BACLOFEN 10 MG: 10 TABLET ORAL at 05:53

## 2017-06-06 RX ADMIN — ATORVASTATIN CALCIUM 10 MG: 10 TABLET, FILM COATED ORAL at 21:22

## 2017-06-06 RX ADMIN — BACLOFEN 10 MG: 10 TABLET ORAL at 16:39

## 2017-06-06 RX ADMIN — COLCHICINE 0.6 MG: 0.6 TABLET, FILM COATED ORAL at 08:43

## 2017-06-06 RX ADMIN — PIPERACILLIN SODIUM,TAZOBACTAM SODIUM 3.38 G: 3; .375 INJECTION, POWDER, FOR SOLUTION INTRAVENOUS at 16:39

## 2017-06-06 RX ADMIN — HEPARIN SODIUM 5000 UNITS: 5000 INJECTION, SOLUTION INTRAVENOUS; SUBCUTANEOUS at 21:22

## 2017-06-06 RX ADMIN — LISINOPRIL 10 MG: 10 TABLET ORAL at 08:43

## 2017-06-06 NOTE — PLAN OF CARE
Problem: Skin Integrity Impairment, Risk/Actual (Adult)  Goal: Skin Integrity/Wound Healing  Outcome: Ongoing (interventions implemented as appropriate)    06/06/17 1347   Skin Integrity Impairment, Risk/Actual (Adult)   Skin Integrity/Wound Healing making progress toward outcome

## 2017-06-06 NOTE — PLAN OF CARE
Problem: Patient Care Overview (Adult)  Goal: Plan of Care Review  Outcome: Ongoing (interventions implemented as appropriate)  Goal: Adult Individualization and Mutuality  Outcome: Ongoing (interventions implemented as appropriate)  Goal: Discharge Needs Assessment  Outcome: Ongoing (interventions implemented as appropriate)    Problem: Pain, Acute (Adult)  Goal: Identify Related Risk Factors and Signs and Symptoms  Outcome: Ongoing (interventions implemented as appropriate)  Goal: Acceptable Pain Control/Comfort Level  Outcome: Ongoing (interventions implemented as appropriate)    Problem: Confusion, Acute (Adult)  Goal: Identify Related Risk Factors and Signs and Symptoms  Outcome: Ongoing (interventions implemented as appropriate)  Goal: Cognitive/Functional Impairments Minimized  Outcome: Ongoing (interventions implemented as appropriate)  Goal: Safety  Outcome: Ongoing (interventions implemented as appropriate)    Problem: Skin Integrity Impairment, Risk/Actual (Adult)  Goal: Identify Related Risk Factors and Signs and Symptoms  Outcome: Ongoing (interventions implemented as appropriate)  Goal: Skin Integrity/Wound Healing  Outcome: Ongoing (interventions implemented as appropriate)

## 2017-06-06 NOTE — PROGRESS NOTES
Discharge Planning Assessment  [facility]     Patient Name: Katherin Ta  MRN: [id number]  Today's Date: 6/6/2017    Admit Date: 6/5/2017          Discharge Needs Assessment       06/06/17 1014    Living Environment    Lives With child(bev), adult    Living Arrangements house    Living Environment Comment Daughter lives with the pt. The pt came for [facility]. Prior to recent illness she  required assist with ADLs and used a r walker.    Discharge Needs Assessment    Community Agency Name(S) Was at [facility] prior to this admission. Was about to DC from [facility]. DME was to be arranged thru [facility].            Discharge Plan       06/06/17 1021    Case Management/Social Work Plan    Plan  vs SNF vs [facility]    Patient/Family In Agreement With Plan yes    Additional Comments I spoke with the pt and her sister. The pt came from [facility] but was about to DC. She may need to return to [facility] vs  vs SNF. She is open to SNF rehab at [place] if needed. Will depend on medical course and PT/OT recs in hospital.        Discharge Placement     No information found        Expected Discharge Date and Time     Expected Discharge Date Expected Discharge Time    Jun 9, 2017               Demographic Summary     None            Functional Status       06/06/17 1013    Functional Status Prior    Ambulation 3-->assistive equipment and person    Transferring 3-->assistive equipment and person    Toileting 2-->assistive person    Bathing 2-->assistive person    Dressing 2-->assistive person    Eating 0-->independent    IADL    Medications assistive person    Meal Preparation assistive person    Housekeeping assistive person    Laundry assistive person    Shopping assistive person    Oral Care independent            Psychosocial     None            Abuse/Neglect     None            Legal     None            Substance Abuse     None            Patient Forms     None          Corinne Vazquez RN

## 2017-06-06 NOTE — PROGRESS NOTES
Pharmacokinetic Consult - Vancomycin Dosing    Katherin Ta is a 82 y.o. female who has been initiated on vancomycin on Zosyn.  Pharmacy consulted to dose vancomycin for sepsis.     Relevant clinical data and objective history reviewed:    Estimated Creatinine Clearance: 50.6 mL/min (by C-G formula based on Cr of 0.6).  I/O last 3 completed shifts:  In: 1258.2 [I.V.:1058.2; IV Piggyback:200]  Out: 2050 [Urine:2050]    Temp Readings from Last 3 Encounters:   06/06/17 97.9 °F (36.6 °C) (Oral)   05/16/17 97.6 °F (36.4 °C) (Oral)     Weight: 160 lb (72.6 kg)    Blood Culture   Date Value Ref Range Status   06/05/2017 No growth at 24 hours  Preliminary       Assessment/Plan  Received vancomycin 1500 mg IV x 1 dose on 6/5 then started on vancomycin 1000 mg IV q24h.  Trough planned tomorrow @ 1030 prior to third total dose .  Goal ~15 mcg/ml.      Thank you for this consult,  Saundra Koenig, PharmD  06/06/17  2:35 PM

## 2017-06-06 NOTE — PLAN OF CARE
Problem: Patient Care Overview (Adult)  Goal: Plan of Care Review  Outcome: Ongoing (interventions implemented as appropriate)    06/06/17 1350   Coping/Psychosocial Response Interventions   Plan Of Care Reviewed With patient;family   Patient Care Overview   Progress progress toward functional goals as expected   Outcome Evaluation   Outcome Summary/Follow up Plan PT perlita completed. Pt adm from [facility] and was preparing for D/C to home w/ assist and HHPT. Pt. presents w/ evolving sx. of significant R LE weakness, dec sensation, balance deficits, gait instability, and requires dep A x 2 for stand-pivot transfer to chair. Recommended to nsg that patient be placed in lift room. Pt. will benefit from skilled IPPT to maximize patient's safety and ind. w/ functional mobility. Recommend D/C to SNF to further progress safety and ind. w/ functional mobility prior to D/C to home.         Problem: Inpatient Physical Therapy  Goal: Bed Mobility Goal LTG- PT  Outcome: Ongoing (interventions implemented as appropriate)    06/06/17 1350   Bed Mobility PT LTG   Bed Mobility PT LTG, Date Established 06/06/17   Bed Mobility PT LTG, Time to Achieve 1 wk   Bed Mobility PT LTG, Activity Type supine to sit/sit to supine;roll left/roll right   Bed Mobility PT LTG, [place] Level moderate assist (50% patient effort);2 person assist required   Bed Mobility PT LTG, Outcome goal ongoing       Goal: Transfer Training Goal 1 LTG- PT  Outcome: Ongoing (interventions implemented as appropriate)    06/06/17 1350   Transfer Training PT LTG   Transfer Training PT LTG, Date Established 06/06/17   Transfer Training PT LTG, Time to Achieve 1 wk   Transfer Training PT LTG, Activity Type bed to chair /chair to bed;sit to stand/stand to sit   Transfer Training PT LTG, [place] Level maximum assist (25% patient effort);2 person assist required   Transfer Training PT LTG, Assist Device walker, rolling  (ARJO)   Transfer Training PT LTG, Outcome goal  ongoing       Goal: Gait Training Goal LTG- PT  Outcome: Ongoing (interventions implemented as appropriate)    06/06/17 1350   Gait Training PT LTG   Gait Training Goal PT LTG, Date Established 06/06/17   Gait Training Goal PT LTG, Time to Achieve 1 wk   Gait Training Goal PT LTG, [place] Level maximum assist (25% patient effort);2 person assist required   Gait Training Goal PT LTG, Assist Device (ARJO)   Gait Training Goal PT LTG, Distance to Achieve 5   Gait Training Goal PT LTG, Outcome goal ongoing       Goal: Static Standing Balance Goal LTG- PT  Outcome: Ongoing (interventions implemented as appropriate)    06/06/17 1350   Static Standing Balance PT LTG   Static Standing Balance PT LTG, Date Established 06/06/17   Static Standing Balance PT LTG, Time to Achieve 1 wk   Static Standing Balance PT LTG, [place] Level maximum assist (25% patient effort);2 person assist required   Static Standing Balance PT LTG, Assist Device assistive Device  (ARJO)   Static Standing Balance PT LTG, Outcome goal ongoing

## 2017-06-06 NOTE — PROGRESS NOTES
"      HOSPITALIST DAILY PROGRESS NOTE    Chief Complaint:  Lethargy and right sided weakness    Subjective   SUBJECTIVE/OVERNIGHT EVENTS   Patient awake and alert, did not get much sleep overnight, denies any fevers or chills, sister is at bedside     Review of Systems:  Gen-no fevers, no chills  CV-no chest pain, no palpitations  Resp-no cough, no dyspnea  GI-no N/V/D, no abd pain    Objective   OBJECTIVE   I have reviewed the vital signs.  /76 (BP Location: Right arm, Patient Position: Lying)  Pulse 83  Temp 97.9 °F (36.6 °C) (Oral)   Resp 16  Ht 62\" (157.5 cm)  Wt 160 lb (72.6 kg)  SpO2 95%  BMI 29.26 kg/m2    Physical Exam:  Gen-no acute distress, seated comfortable  CV-RRR, S1 S2 normal, no m/r/g  Resp-CTAB, no wheezes  Abd-soft, NT, ND, +BS  Ext-no edema  Neuro-A&Ox3, no focal deficits  Psych-appropriate mood and affect    Results:  I have reviewed the labs, culture data, radiology results, and diagnostic studies.      Results from last 7 days  Lab Units 06/06/17  0507 06/05/17  1012   WBC 10*3/mm3 12.97* 17.66*   HEMOGLOBIN g/dL 11.2* 12.5   HEMATOCRIT % 35.2 38.6   PLATELETS 10*3/mm3 155 162       Results from last 7 days  Lab Units 06/06/17  0507   SODIUM mmol/L 143   POTASSIUM mmol/L 3.8   CHLORIDE mmol/L 111*   TOTAL CO2 mmol/L 27.0   BUN mg/dL 15   CREATININE mg/dL 0.60   GLUCOSE mg/dL 101*   CALCIUM mg/dL 9.6       Culture Data:  Cultures:    Blood Culture   Date Value Ref Range Status   06/05/2017 No growth at less than 24 hours  Preliminary   06/05/2017 No growth at less than 24 hours  Preliminary         Radiology Results:  Imaging Results (last 24 hours)     Procedure Component Value Units Date/Time    XR Chest 1 View [[id number]] Collected:  06/05/17 1129     Updated:  06/06/17 1212    Narrative:       EXAMINATION: XR CHEST 1 VW- 06/05/2017     INDICATION: Weak/Dizzy/AMS triage protocol      COMPARISON: NONE     FINDINGS: Portable chest reveals heart to be enlarged. Lung volumes " are  low. Mild increased markings at the left lung base. Calcified granuloma  identified in the right upper lobe. Degenerative changes seen within the  spine with vascular calcifications present.           Impression:       Heart is enlarged with prominence of the pulmonary  vascularity bilaterally. Lung volumes are low.     D:  06/05/2017  E:  06/05/2017     This report was finalized on 6/6/2017 12:10 PM by Dr. Mirela Haynes MD.       MRI Brain Without Contrast [[id number]] Collected:  06/05/17 1325     Updated:  06/06/17 1215    Narrative:       EXAMINATION: MRI BRAIN WO CONTRAST- 06/05/2017     INDICATION: R40.0-Somnolence; A41.9-Sepsis, unspecified organism; mental  status change.     TECHNIQUE: Routine multiplanar imaging was obtained of the brain without  the administration of gadolinium contrast.     COMPARISON: NONE     FINDINGS: There is atrophy identified of the brain. Signal changes seen  scattered throughout the periventricular and subcortical white matter  suggesting chronic small vessel ischemic change. Visualized paranasal  sinuses are clear. Globes and orbits are intact. The mastoid air cells  are patent. No cerebellopontine angle mass identified.  Pituitary and  sella are unremarkable. Craniovertebral junction is preserved. No  evidence of restricted diffusion to suggest evidence of an acute  ischemic insult. Flow voids are preserved in the major intracranial  vessels.       Impression:       Atrophy of the brain with some chronic small vessel ischemic  changes seen in the periventricular and subcortical white matter. No  acute intracranial abnormality is identified.     D:  06/05/2017  E:  06/05/2017         This report was finalized on 6/6/2017 12:13 PM by Dr. Mirela Haynes MD.       CT Abdomen Pelvis Without Contrast [[id number]] Collected:  06/05/17 1416     Updated:  06/06/17 1215    Narrative:       EXAMINATION: CT ABDOMEN PELVIS WO CONTRAST-, CT CHEST WO CONTRAST-  06/05/2017      INDICATION: fever; R40.0-Somnolence; A41.9-Sepsis, unspecified organism;  focal weakness     TECHNIQUE: Multiple axial CT imaging was obtained of the chest, abdomen  and pelvis without the administration of oral or intravenous contrast.     The radiation dose reduction device was turned on for each scan per the  ALARA (As Low as Reasonably Achievable) protocol.     COMPARISON: 05/10/2017     FINDINGS:      CHEST: Thyroid is homogeneous in appearance. No mediastinal mass or  adenopathy. The cardiac chambers are within normal limits. No  pericardial effusion. Some atelectatic changes identified in the lung  bases bilaterally. The lung apices are clear. No parenchymal  consolidation, pulmonary mass or nodule present. Degenerative changes  seen within the spine. Calcification of subcarinal region suggesting old  healed granulomatous disease.     ABDOMEN: There is mild dilatation identified of the renal collecting  systems bilaterally. Distention identified of the bladder. There is mild  stranding identified of the kidneys. No renal or ureteral stone  identified. There is some stranding identified in the right flank.  Abnormal wall thickening of the ascending colon. Findings may suggest a  focal diverticulitis or colitis. There is a small diverticula identified  in this area. No definite extraluminal air. There is no abscess  collection identified. Fluid in the right paracolic gutter. No  significant fluid in the pelvis. No gallstones identified. Diverticula  identified throughout the remainder of the colon. Pancreas is  homogeneous.     PELVIS: Mild distention of the bladder. Stool within the colon. Pelvic  organs are otherwise unremarkable. No pelvic adenopathy. No free fluid  or free air. Degenerative changes seen within the spine and pelvis.       Impression:       Atelectatic changes in lung bases bilaterally. Some  stranding identified surrounding abnormal wall thickening of the  ascending colon. Findings  suggesting either focal colitis or  diverticulitis. No abscess collection or free air.     D:  06/05/2017  E:  06/05/2017        This report was finalized on 6/6/2017 12:13 PM by Dr. Mirela Haynes MD.       CT Chest Without Contrast [[id number]] Collected:  06/05/17 1416     Updated:  06/06/17 1215    Narrative:       EXAMINATION: CT ABDOMEN PELVIS WO CONTRAST-, CT CHEST WO CONTRAST-  06/05/2017     INDICATION: fever; R40.0-Somnolence; A41.9-Sepsis, unspecified organism;  focal weakness     TECHNIQUE: Multiple axial CT imaging was obtained of the chest, abdomen  and pelvis without the administration of oral or intravenous contrast.     The radiation dose reduction device was turned on for each scan per the  ALARA (As Low as Reasonably Achievable) protocol.     COMPARISON: 05/10/2017     FINDINGS:      CHEST: Thyroid is homogeneous in appearance. No mediastinal mass or  adenopathy. The cardiac chambers are within normal limits. No  pericardial effusion. Some atelectatic changes identified in the lung  bases bilaterally. The lung apices are clear. No parenchymal  consolidation, pulmonary mass or nodule present. Degenerative changes  seen within the spine. Calcification of subcarinal region suggesting old  healed granulomatous disease.     ABDOMEN: There is mild dilatation identified of the renal collecting  systems bilaterally. Distention identified of the bladder. There is mild  stranding identified of the kidneys. No renal or ureteral stone  identified. There is some stranding identified in the right flank.  Abnormal wall thickening of the ascending colon. Findings may suggest a  focal diverticulitis or colitis. There is a small diverticula identified  in this area. No definite extraluminal air. There is no abscess  collection identified. Fluid in the right paracolic gutter. No  significant fluid in the pelvis. No gallstones identified. Diverticula  identified throughout the remainder of the colon. Pancreas  is  homogeneous.     PELVIS: Mild distention of the bladder. Stool within the colon. Pelvic  organs are otherwise unremarkable. No pelvic adenopathy. No free fluid  or free air. Degenerative changes seen within the spine and pelvis.       Impression:       Atelectatic changes in lung bases bilaterally. Some  stranding identified surrounding abnormal wall thickening of the  ascending colon. Findings suggesting either focal colitis or  diverticulitis. No abscess collection or free air.     D:  06/05/2017  E:  06/05/2017        This report was finalized on 6/6/2017 12:13 PM by Dr. Mirela Haynes MD.             I have reviewed the medications.    Assessment/Plan   ASSESSMENT/PLAN    Principal Problem:    Somnolence  Active Problems:    Hypertension    Hypothyroid    Hyperlipidemia    GERD (gastroesophageal reflux disease)    Weakness    82 yof, currently at [facility] after being admitted and d/c from [facility] for non-operable intractable back pain (5/10-5/16) she developed right-sided facial droop, right arm weakness, but presented for difficulty to arouse all that have since resolved, etiology of her symptoms are unknown, but suspect drug induced. However, on presentation here patient had fever and tachycardia with some leukocytosis admited with sepsis source unknown? Colitis ?diverticulitis    Plan  - MRI with no intracranial abnormalities  - UA wnl, blood cultures sent NGTD  - started on abx, will continue, de-escalate quickly  - CT abd suggestive of either focal colitis or diverticulitis.  - leukocytosis improving  - repeat labs in AM    Dispo: will anticipate d/c to home with HH/PT in 1-2 days    Ellen Chin MD  06/06/17  12:51 PM

## 2017-06-06 NOTE — THERAPY EVALUATION
Acute Care - Physical Therapy Initial Evaluation   [facility]     Patient Name: Katherin Ta  : 1934  MRN: [id number]  Today's Date: 2017   Onset of Illness/Injury or Date of Surgery Date: 17  Date of Referral to PT: 17  Referring Physician: KATHY Robles MD      Admit Date: 2017     Visit Dx:    ICD-10-CM ICD-9-CM   1. Somnolence R40.0 780.09   2. Sepsis, due to unspecified organism A41.9 038.9     995.91   3. Impaired functional mobility, balance, gait, and endurance Z74.09 V49.89     Patient Active Problem List   Diagnosis   • Rheumatoid arthritis   • Spinal stenosis   • Hypertension   • Hypothyroid   • Hyperlipidemia   • GERD (gastroesophageal reflux disease)   • Intractable back pain   • Weakness   • Somnolence     Past Medical History:   Diagnosis Date   • GERD (gastroesophageal reflux disease) 2017   • Gout    • Hyperlipidemia 2017   • Hypertension 2017   • Hypothyroid 2017   • Rheumatoid arthritis    • Spinal stenosis      Past Surgical History:   Procedure Laterality Date   • AMPUTATION DIGIT Right     right middle toe   • BACK SURGERY     • EYE SURGERY     • HAND SURGERY     • JOINT REPLACEMENT      left knee   • PERICARDIAL WINDOW            PT ASSESSMENT (last 72 hours)      PT Evaluation       17 1110 17 1014    Rehab Evaluation    Document Type evaluation  -MB     Subjective Information agree to therapy;no complaints  -MB     Patient Effort, Rehab Treatment good  -MB     Symptoms Noted During/After Treatment none  -MB     General Information    Patient Profile Review yes  -MB     Onset of Illness/Injury or Date of Surgery Date 17  -MB     Referring Physician KATHY Robles MD  -MB     General Observations Pt. supine w/ IV L UE intact, telemetry, pulse ox, alexander, on RA.  Nsg consent for PT.  Pt's sister present at bedside.  -MB     Pertinent History Of Current Problem Pt. adm via EMS from [facility] w/ R side facial droop, R UE weakness, chronic R LE  weakness, and somnolence.  Pt. recently adm. to [facility] w/ intractable back pain; pt. not a candidate for spinal sx.  Pt. has h/o spinal stenosis and has morphine pump.  -MB     Precautions/Limitations fall precautions   morphine pump  -MB     Prior Level of Function independent:;bed mobility;max assist:;transfer   Pt. was ambulatory approx 1 month ago; SB transfers at [facility].  -MB     Equipment Currently Used at Home walker, rolling   transport chair  -MB     Plans/Goals Discussed With patient and family;agreed upon  -MB     Risks Reviewed patient and family:;LOB;nausea/vomiting;dizziness;increased discomfort;change in vital signs;lines disloged  -MB     Benefits Reviewed patient and family:;improve function;increase independence;increase strength;increase balance;decrease pain;decrease risk of DVT;improve skin integrity;increase knowledge  -MB     Barriers to Rehab previous functional deficit;medically complex  -MB     Living Environment    Lives With child(bev), adult  -MB child(bev), adult  -    Living Arrangements house  -MB house  -    Home Accessibility stairs to enter home  -MB     Number of Stairs to Enter Home 2  -MB     Stair Railings at Home outside, present on left side  -MB     Living Environment Comment Prior to recent hospitalization at [facility] then to [facility], pt. lived w/ dtr and was ambulating household distances w/ RW.  Pt. non-ambulatory and performing sliding board transfers at [facility], PTA.  -MB Daughter lives with the pt. The pt came for [facility]. Prior to recent illness she  required assist with ADLs and used a r walker.  -SP    Clinical Impression    Date of Referral to PT 06/05/17  -MB     PT Diagnosis Functional decline; weakness; Gait instability  -MB     Functional Level At Time Of Evaluation Pt. dependent for stand-pivot transfer to chair.  -MB     Patient/Family Goals Statement Pt.'s goal is to D/C to home w/ assist and HHPT.  -MB     Criteria for Skilled Therapeutic Interventions Met yes;treatment  indicated  -MB     Rehab Potential good, to achieve stated therapy goals  -MB     Vital Signs    Pre Systolic BP Rehab 146  -MB     Pre Treatment Diastolic BP 84  -MB     Pretreatment Heart Rate (beats/min) 88  -MB     Pre SpO2 (%) 95  -MB     O2 Delivery Pre Treatment room air  -MB     Pre Patient Position Supine  -MB     Intra Patient Position Standing  -MB     Post Patient Position Sitting  -MB     Pain Assessment    Pain Assessment No/denies pain  -MB     Vision Assessment/Intervention    Visual Impairment WFL with corrective lenses  -MB     Cognitive Assessment/Intervention    Current Cognitive/Communication Assessment functional  -MB     Orientation Status oriented x 4  -MB     Follows Commands/Answers Questions 100% of the time;able to follow single-step instructions;needs cueing;needs increased time  -MB     Personal Safety WNL/WFL  -MB     Personal Safety Interventions fall prevention program maintained;gait belt;muscle strengthening facilitated;nonskid shoes/slippers when out of bed  -MB     ROM (Range of Motion)    General ROM no range of motion deficits identified  -MB     MMT (Manual Muscle Testing)    General MMT Assessment lower extremity strength deficits identified  -MB     General MMT Assessment Detail L LE grossly 4-/5.  R LE 1/5.   defer UEs to OT.  -MB     Bed Mobility, Assessment/Treatment    Bed Mobility, Assistive Device bed rails;draw sheet  -MB     Bed Mobility, Roll Left, [place] maximum assist (25% patient effort);verbal cues required  -MB     Bed Mobility, Roll Right, [place] maximum assist (25% patient effort);verbal cues required  -MB     Bed Mob, Supine to Sit, [place] maximum assist (25% patient effort);2 person assist required;verbal cues required  -MB     Bed Mob, Sit to Supine, [place] not tested  -MB     Bed Mobility, Safety Issues decreased use of legs for bridging/pushing;decreased use of arms for pushing/pulling  -MB     Bed Mobility, Impairments sensation  decreased;strength decreased;impaired balance;coordination impaired  -MB     Bed Mobility, Comment Pt. rolled L/R for hygiene.  Pt. incontinent of bowel requiring dep A for hygiene.  VCs for sequencing and assist at hips to facilitate.   -MB     Transfer Assessment/Treatment    Transfers, Bed-Chair [place] dependent (less than 25% patient effort);2 person assist required;verbal cues required  -MB     Transfers, Chair-Bed [place] not tested  -MB     Transfers, Bed-Chair-Bed, Assist Device --   gait belt, B UE support  -MB     Transfer, Safety Issues balance decreased during turns;sequencing ability decreased;step length decreased;weight-shifting ability decreased;knees buckling  -MB     Transfer, Impairments sensation decreased;strength decreased;impaired balance;motor control impaired  -MB     Transfer, Comment Pt. transferred bed -> chair via stand-pivot w/ dep A x 2. Pt. unable to stand upright, shift weight, and advance LEs.  Recommended to [facility] that patient be moved to lift room.    -MB     Gait Assessment/Treatment    Gait, [place] Level not appropriate to assess;unable to perform  -MB     Motor Skills/Interventions    Additional Documentation Balance Skills Training (Group)  -MB     Balance Skills Training    Sitting-Level of Assistance Minimum assistance  -MB     Sitting-Balance Support Feet supported;Right upper extremity supported;Left upper extremity supported  -MB     Sitting-Balance Activities Trunk control activities;Forward lean;Lateral lean  -MB     Sitting # of Minutes 10  -MB     Standing-Level of Assistance Dependent;x2  -MB     Static Standing Balance Support Right upper extremity supported;Left upper extremity supported   gait belt  -MB     Sensory Assessment/Intervention    Light Touch LLE;RLE  -MB     LLE Light Touch mild impairment  -MB     RLE Light Touch severe impairment  -MB     Positioning and Restraints    Pre-Treatment Position in bed  -MB     Post Treatment Position chair   -MB     In Chair notified nsg;reclined;call light within reach;encouraged to call for assist;exit alarm on;with family/caregiver;waffle cushion;on mechanical lift sling;legs elevated;heels elevated  -MB       06/05/17 1411       General Information    Equipment Currently Used at Home walker, rolling;wheelchair  -ANGY     Living Environment    Lives With child(bev), adult  -ANGY     Living Arrangements house  -     Home Accessibility stairs to enter home  -     Number of Stairs to Enter Home 2  -ANGY     Stair Railings at Home outside, present on left side  -     Type of Financial/Environmental Concern none  -ANGY     Transportation Available car  -       User Key  (r) = Recorded By, (t) = Taken By, (c) = Cosigned By    Initials Name Provider Type    BRYANNA Sterling, PT Physical Therapist    SP Corinne Vazquez, RN Case Manager    ANGY Diehl, RN Registered Nurse          Physical Therapy Education     Title: PT OT SLP Therapies (Active)     Topic: Physical Therapy (Active)     Point: Mobility training (Active)    Learning Progress Summary    Learner Readiness Method Response Comment Documented by Status   Patient Acceptance E,D NR D/C planning, home safety, fall precautions, POC progression MB 06/06/17 1349 Active   Family Acceptance E,D NR D/C planning, home safety, fall precautions, POC progression MB 06/06/17 1349 Active               Point: Home exercise program (Active)    Learning Progress Summary    Learner Readiness Method Response Comment Documented by Status   Patient Acceptance E,D NR D/C planning, home safety, fall precautions, POC progression MB 06/06/17 1349 Active   Family Acceptance E,D NR D/C planning, home safety, fall precautions, POC progression MB 06/06/17 1349 Active               Point: Body mechanics (Active)    Learning Progress Summary    Learner Readiness Method Response Comment Documented by Status   Patient Acceptance E,D NR D/C planning, home safety, fall precautions, POC  progression MB 06/06/17 1349 Active   Family Acceptance E,D NR D/C planning, home safety, fall precautions, POC progression MB 06/06/17 1349 Active               Point: Precautions (Active)    Learning Progress Summary    Learner Readiness Method Response Comment Documented by Status   Patient Acceptance E,D NR D/C planning, home safety, fall precautions, POC progression MB 06/06/17 1349 Active   Family Acceptance E,D NR D/C planning, home safety, fall precautions, POC progression MB 06/06/17 1349 Active                      User Key     Initials Effective Dates Name Provider Type Discipline    MB 03/14/16 -  Vero Sterling, PT Physical Therapist PT                PT Recommendation and Plan  Anticipated Equipment Needs At Discharge: bedside commode, tub bench, wheelchair (If D/C to home.  N/A if D/C to SNF.)  Anticipated Discharge Disposition: skilled nursing facility  Planned Therapy Interventions: balance training, bed mobility training, gait training, home exercise program, patient/family education, strengthening, transfer training  PT Frequency: daily  Plan of Care Review  Plan Of Care Reviewed With: patient, family  Progress: progress toward functional goals as expected  Outcome Summary/Follow up Plan: PT perlita completed.  Pt adm from [facility] and was preparing for D/C to home w/ assist and HHPT.  Pt. presents w/ evolving sx. of significant R LE weakness, dec sensation, balance deficits, gait instability, and requires dep  A x 2 for stand-pivot transfer to chair.  Recommended to nsg that patient be placed in lift room.  Pt. will benefit from skilled IPPT to maximize patient's safety and ind. w/ functional mobility.  Recommend D/C to SNF to further progress safety and ind. w/ functional mobility prior to D/C to home.          IP PT Goals       06/06/17 1350          Bed Mobility PT LTG    Bed Mobility PT LTG, Date Established 06/06/17  -MB      Bed Mobility PT LTG, Time to Achieve 1 wk  -MB      Bed Mobility PT  LTG, Activity Type supine to sit/sit to supine;roll left/roll right  -MB      Bed Mobility PT LTG, [place] Level moderate assist (50% patient effort);2 person assist required  -MB      Bed Mobility PT LTG, Outcome goal ongoing  -MB      Transfer Training PT LTG    Transfer Training PT LTG, Date Established 06/06/17  -MB      Transfer Training PT LTG, Time to Achieve 1 wk  -MB      Transfer Training PT LTG, Activity Type bed to chair /chair to bed;sit to stand/stand to sit  -MB      Transfer Training PT LTG, [place] Level maximum assist (25% patient effort);2 person assist required  -MB      Transfer Training PT LTG, Assist Device walker, rolling   ARJO  -MB      Transfer Training PT LTG, Outcome goal ongoing  -MB      Gait Training PT LTG    Gait Training Goal PT LTG, Date Established 06/06/17  -MB      Gait Training Goal PT LTG, Time to Achieve 1 wk  -MB      Gait Training Goal PT LTG, [place] Level maximum assist (25% patient effort);2 person assist required  -MB      Gait Training Goal PT LTG, Assist Device --   ARJO  -MB      Gait Training Goal PT LTG, Distance to Achieve 5  -MB      Gait Training Goal PT LTG, Outcome goal ongoing  -MB      Static Standing Balance PT LTG    Static Standing Balance PT LTG, Date Established 06/06/17  -MB      Static Standing Balance PT LTG, Time to Achieve 1 wk  -MB      Static Standing Balance PT LTG, [place] Level maximum assist (25% patient effort);2 person assist required  -MB      Static Standing Balance PT LTG, Assist Device assistive Device   ARJO  -MB      Static Standing Balance PT LTG, Outcome goal ongoing  -MB        User Key  (r) = Recorded By, (t) = Taken By, (c) = Cosigned By    Initials Name Provider Type    BRYANNA Sterling, PT Physical Therapist                Outcome Measures       06/06/17 1110          How much help from another person do you currently need...    Turning from your back to your side while in flat bed without using  bedrails? 2  -MB      Moving from lying on back to sitting on the side of a flat bed without bedrails? 2  -MB      Moving to and from a bed to a chair (including a wheelchair)? 1  -MB      Standing up from a chair using your arms (e.g., wheelchair, bedside chair)? 1  -MB      Climbing 3-5 steps with a railing? 1  -MB      To walk in hospital room? 1  -MB      AM-PAC 6 Clicks Score 8  -MB      Functional Assessment    Outcome Measure Options AM-PAC 6 Clicks Basic Mobility (PT)  -MB        User Key  (r) = Recorded By, (t) = Taken By, (c) = Cosigned By    Initials Name Provider Type    BRYANNA Sterling PT Physical Therapist           Time Calculation:         PT Charges       06/06/17 1404          Time Calculation    Start Time 1110  -MB      PT Received On 06/06/17  -MB      PT Goal Re-Cert Due Date 06/16/17  -MB      Time Calculation- PT    Total Timed Code Minutes- PT 20 minute(s)  -MB        User Key  (r) = Recorded By, (t) = Taken By, (c) = Cosigned By    Initials Name Provider Type    BRYANNA Sterling PT Physical Therapist          Therapy Charges for Today     Code Description Service Date Service Provider Modifiers Qty    51779841274 HC PT MOBILITY CURRENT 6/6/2017 Vero Sterling, PT GP, CM 1    52569179901 HC PT MOBILITY PROJECTED 6/6/2017 Vero Sterling PT GP, CL 1    39756879227 HC PT EVAL MOD COMPLEXITY 4 6/6/2017 Vero Sterling PT GP 1    97739935323 HC PT THER PROC EA 15 MIN 6/6/2017 Vero Sterling PT GP 1          PT G-Codes  PT Professional Judgement Used?: Yes  Outcome Measure Options: AM-PAC 6 Clicks Basic Mobility (PT)  Score: 8  Functional Limitation: Mobility: Walking and moving around  Mobility: Walking and Moving Around Current Status (): At least 80 percent but less than 100 percent impaired, limited or restricted  Mobility: Walking and Moving Around Goal Status (): At least 60 percent but less than 80 percent impaired, limited or restricted      Vero  MANNY Sterling, PT  6/6/2017

## 2017-06-07 LAB
ANION GAP SERPL CALCULATED.3IONS-SCNC: 2 MMOL/L (ref 3–11)
BUN BLD-MCNC: 12 MG/DL (ref 9–23)
BUN/CREAT SERPL: 20 (ref 7–25)
CALCIUM SPEC-SCNC: 9.8 MG/DL (ref 8.7–10.4)
CHLORIDE SERPL-SCNC: 111 MMOL/L (ref 99–109)
CO2 SERPL-SCNC: 30 MMOL/L (ref 20–31)
CREAT BLD-MCNC: 0.6 MG/DL (ref 0.6–1.3)
DEPRECATED RDW RBC AUTO: 57.2 FL (ref 37–54)
ERYTHROCYTE [DISTWIDTH] IN BLOOD BY AUTOMATED COUNT: 15.3 % (ref 11.3–14.5)
GFR SERPL CREATININE-BSD FRML MDRD: 96 ML/MIN/1.73
GLUCOSE BLD-MCNC: 93 MG/DL (ref 70–100)
HCT VFR BLD AUTO: 33.6 % (ref 34.5–44)
HGB BLD-MCNC: 10.8 G/DL (ref 11.5–15.5)
MCH RBC QN AUTO: 32.9 PG (ref 27–31)
MCHC RBC AUTO-ENTMCNC: 32.1 G/DL (ref 32–36)
MCV RBC AUTO: 102.4 FL (ref 80–99)
PLATELET # BLD AUTO: 147 10*3/MM3 (ref 150–450)
PMV BLD AUTO: 11.2 FL (ref 6–12)
POTASSIUM BLD-SCNC: 3.7 MMOL/L (ref 3.5–5.5)
RBC # BLD AUTO: 3.28 10*6/MM3 (ref 3.89–5.14)
SODIUM BLD-SCNC: 143 MMOL/L (ref 132–146)
VANCOMYCIN TROUGH SERPL-MCNC: 6.6 MCG/ML (ref 10–20)
WBC NRBC COR # BLD: 7.96 10*3/MM3 (ref 3.5–10.8)

## 2017-06-07 PROCEDURE — 25010000002 PIPERACILLIN-TAZOBACTAM: Performed by: INTERNAL MEDICINE

## 2017-06-07 PROCEDURE — 85027 COMPLETE CBC AUTOMATED: CPT | Performed by: INTERNAL MEDICINE

## 2017-06-07 PROCEDURE — G0378 HOSPITAL OBSERVATION PER HR: HCPCS

## 2017-06-07 PROCEDURE — 25010000002 HEPARIN (PORCINE) PER 1000 UNITS: Performed by: INTERNAL MEDICINE

## 2017-06-07 PROCEDURE — 97110 THERAPEUTIC EXERCISES: CPT

## 2017-06-07 PROCEDURE — 80048 BASIC METABOLIC PNL TOTAL CA: CPT | Performed by: INTERNAL MEDICINE

## 2017-06-07 PROCEDURE — 99225 PR SBSQ OBSERVATION CARE/DAY 25 MINUTES: CPT | Performed by: INTERNAL MEDICINE

## 2017-06-07 PROCEDURE — 96372 THER/PROPH/DIAG INJ SC/IM: CPT

## 2017-06-07 PROCEDURE — 80202 ASSAY OF VANCOMYCIN: CPT | Performed by: INTERNAL MEDICINE

## 2017-06-07 RX ORDER — AMOXICILLIN AND CLAVULANATE POTASSIUM 875; 125 MG/1; MG/1
1 TABLET, FILM COATED ORAL EVERY 12 HOURS SCHEDULED
Status: DISCONTINUED | OUTPATIENT
Start: 2017-06-07 | End: 2017-06-09 | Stop reason: HOSPADM

## 2017-06-07 RX ADMIN — LEVOTHYROXINE SODIUM 75 MCG: 75 TABLET ORAL at 06:47

## 2017-06-07 RX ADMIN — GABAPENTIN 300 MG: 300 CAPSULE ORAL at 06:47

## 2017-06-07 RX ADMIN — SODIUM CHLORIDE 100 ML/HR: 9 INJECTION, SOLUTION INTRAVENOUS at 19:43

## 2017-06-07 RX ADMIN — LISINOPRIL 10 MG: 10 TABLET ORAL at 08:12

## 2017-06-07 RX ADMIN — ATORVASTATIN CALCIUM 10 MG: 10 TABLET, FILM COATED ORAL at 19:40

## 2017-06-07 RX ADMIN — GABAPENTIN 300 MG: 300 CAPSULE ORAL at 19:39

## 2017-06-07 RX ADMIN — ACETAMINOPHEN 500 MG: 500 TABLET ORAL at 19:40

## 2017-06-07 RX ADMIN — BACLOFEN 10 MG: 10 TABLET ORAL at 06:47

## 2017-06-07 RX ADMIN — ASPIRIN 81 MG 81 MG: 81 TABLET ORAL at 08:12

## 2017-06-07 RX ADMIN — AMLODIPINE BESYLATE 5 MG: 5 TABLET ORAL at 08:12

## 2017-06-07 RX ADMIN — ALLOPURINOL 200 MG: 100 TABLET ORAL at 08:12

## 2017-06-07 RX ADMIN — GABAPENTIN 300 MG: 300 CAPSULE ORAL at 13:50

## 2017-06-07 RX ADMIN — PANTOPRAZOLE SODIUM 40 MG: 40 TABLET, DELAYED RELEASE ORAL at 06:47

## 2017-06-07 RX ADMIN — BACLOFEN 10 MG: 10 TABLET ORAL at 11:56

## 2017-06-07 RX ADMIN — SODIUM CHLORIDE 100 ML/HR: 9 INJECTION, SOLUTION INTRAVENOUS at 11:56

## 2017-06-07 RX ADMIN — PIPERACILLIN SODIUM,TAZOBACTAM SODIUM 3.38 G: 3; .375 INJECTION, POWDER, FOR SOLUTION INTRAVENOUS at 09:14

## 2017-06-07 RX ADMIN — CASTOR OIL AND BALSAM, PERU: 788; 87 OINTMENT TOPICAL at 19:39

## 2017-06-07 RX ADMIN — BENAZEPRIL HYDROCHLORIDE 10 MG: 5 TABLET, COATED ORAL at 08:16

## 2017-06-07 RX ADMIN — AMOXICILLIN AND CLAVULANATE POTASSIUM 1 TABLET: 875; 125 TABLET, FILM COATED ORAL at 19:40

## 2017-06-07 RX ADMIN — DOCUSATE SODIUM 100 MG: 100 CAPSULE, LIQUID FILLED ORAL at 17:28

## 2017-06-07 RX ADMIN — BACLOFEN 10 MG: 10 TABLET ORAL at 23:13

## 2017-06-07 RX ADMIN — HEPARIN SODIUM 5000 UNITS: 5000 INJECTION, SOLUTION INTRAVENOUS; SUBCUTANEOUS at 13:50

## 2017-06-07 RX ADMIN — COLCHICINE 0.6 MG: 0.6 TABLET, FILM COATED ORAL at 08:12

## 2017-06-07 RX ADMIN — PIPERACILLIN SODIUM,TAZOBACTAM SODIUM 3.38 G: 3; .375 INJECTION, POWDER, FOR SOLUTION INTRAVENOUS at 04:00

## 2017-06-07 RX ADMIN — Medication 5 MG: at 19:40

## 2017-06-07 RX ADMIN — SODIUM CHLORIDE 100 ML/HR: 9 INJECTION, SOLUTION INTRAVENOUS at 21:51

## 2017-06-07 RX ADMIN — HEPARIN SODIUM 5000 UNITS: 5000 INJECTION, SOLUTION INTRAVENOUS; SUBCUTANEOUS at 19:39

## 2017-06-07 RX ADMIN — DOCUSATE SODIUM 100 MG: 100 CAPSULE, LIQUID FILLED ORAL at 08:12

## 2017-06-07 RX ADMIN — CASTOR OIL AND BALSAM, PERU: 788; 87 OINTMENT TOPICAL at 08:16

## 2017-06-07 RX ADMIN — HEPARIN SODIUM 5000 UNITS: 5000 INJECTION, SOLUTION INTRAVENOUS; SUBCUTANEOUS at 06:47

## 2017-06-07 RX ADMIN — BACLOFEN 10 MG: 10 TABLET ORAL at 17:28

## 2017-06-07 RX ADMIN — BACLOFEN 10 MG: 10 TABLET ORAL at 00:44

## 2017-06-07 NOTE — THERAPY TREATMENT NOTE
Acute Care - Physical Therapy Treatment Note  [facility]     Patient Name: Katherin Ta  : 1934  MRN: [id number]  Today's Date: 2017  Onset of Illness/Injury or Date of Surgery Date: 17  Date of Referral to PT: 17  Referring Physician: KATHY Robles MD    Admit Date: 2017    Visit Dx:    ICD-10-CM ICD-9-CM   1. Somnolence R40.0 780.09   2. Sepsis, due to unspecified organism A41.9 038.9     995.91   3. Impaired functional mobility, balance, gait, and endurance Z74.09 V49.89     Patient Active Problem List   Diagnosis   • Rheumatoid arthritis   • Spinal stenosis   • Hypertension   • Hypothyroid   • Hyperlipidemia   • GERD (gastroesophageal reflux disease)   • Intractable back pain   • Weakness   • Somnolence               Adult Rehabilitation Note       17 1045          Rehab Assessment/Intervention    Discipline physical therapy assistant  -AS      Document Type therapy note (daily note)  -AS      Subjective Information agree to therapy;complains of;weakness  -AS      Patient Effort, Rehab Treatment good  -AS      Precautions/Limitations fall precautions  -AS      Recorded by [AS] Nelly Fink PTA      Pain Assessment    Pain Assessment No/denies pain  -AS      Recorded by [AS] Nelly Fink PTA      Cognitive Assessment/Intervention    Current Cognitive/Communication Assessment functional  -AS      Orientation Status oriented to;person;place  -AS      Follows Commands/Answers Questions 100% of the time;able to follow single-step instructions  -AS      Personal Safety WNL/WFL  -AS      Personal Safety Interventions fall prevention program maintained;gait belt;nonskid shoes/slippers when out of bed  -AS      Recorded by [AS] Nelly Fink PTA      Bed Mobility, Assessment/Treatment    Bed Mobility, Assistive Device draw sheet  -AS      Bed Mobility, Roll Left, Elbert verbal cues required;maximum assist (25% patient effort);2 person assist required  -AS       Bed Mobility, Roll Right, [place] verbal cues required;maximum assist (25% patient effort);2 person assist required;other (see comments)   to position lift sling  -AS      Bed Mobility, Comment patient rolled to place lift sling  -AS      Recorded by [AS] Nelly Fink PTA      Transfer Assessment/Treatment    Transfers, Bed-Chair Ralls dependent (less than 25% patient effort);2 person assist required  -AS      Transfers, Bed-Chair-Bed, Assist Device mechanical lift  -AS      Recorded by [AS] Nelly Fink PTA      Gait Assessment/Treatment    Gait, [place] Level not appropriate to assess  -AS      Recorded by [AS] Nelly Fink PTA      Therapy Exercises    Right Lower Extremity PROM:;10 reps;supine;ankle pumps/circles;calf stretch;hip abduction/adduction;heel slides  -AS      Left Lower Extremity AAROM:;10 reps;supine;ankle pumps/circles;calf stretch;hip abduction/adduction;heel slides  -AS      Recorded by [AS] Nelly Fink PTA      Positioning and Restraints    Pre-Treatment Position in bed  -AS      Post Treatment Position chair  -AS      In Chair reclined;call light within reach;encouraged to call for assist;with family/caregiver;on mechanical lift sling  -AS      Recorded by [AS] Nelly Fink PTA        User Key  (r) = Recorded By, (t) = Taken By, (c) = Cosigned By    Initials Name Effective Dates    AS Nelly Fink PTA 06/22/15 -                 IP PT Goals       06/07/17 1109 06/06/17 1350       Bed Mobility PT LTG    Bed Mobility PT LTG, Date Established  06/06/17  -MB     Bed Mobility PT LTG, Time to Achieve  1 wk  -MB     Bed Mobility PT LTG, Activity Type  supine to sit/sit to supine;roll left/roll right  -MB     Bed Mobility PT LTG, [place] Level  moderate assist (50% patient effort);2 person assist required  -MB     Bed Mobility PT LTG, Date Goal Reviewed 06/07/17  -AS      Bed Mobility PT LTG, Outcome goal ongoing  -AS goal ongoing  -MB      Transfer Training PT LTG    Transfer Training PT LTG, Date Established  06/06/17  -MB     Transfer Training PT LTG, Time to Achieve  1 wk  -MB     Transfer Training PT LTG, Activity Type  bed to chair /chair to bed;sit to stand/stand to sit  -MB     Transfer Training PT LTG, [place] Level  maximum assist (25% patient effort);2 person assist required  -MB     Transfer Training PT LTG, Assist Device  walker, rolling   ARJO  -MB     Transfer Training PT  LTG, Date Goal Reviewed 06/07/17  -AS      Transfer Training PT LTG, Outcome goal ongoing  -AS goal ongoing  -MB     Gait Training PT LTG    Gait Training Goal PT LTG, Date Established  06/06/17  -MB     Gait Training Goal PT LTG, Time to Achieve  1 wk  -MB     Gait Training Goal PT LTG, [place] Level  maximum assist (25% patient effort);2 person assist required  -MB     Gait Training Goal PT LTG, Assist Device  --   ARJO  -MB     Gait Training Goal PT LTG, Distance to Achieve  5  -MB     Gait Training Goal PT LTG, Date Goal Reviewed 06/07/17  -AS      Gait Training Goal PT LTG, Outcome goal ongoing  -AS goal ongoing  -MB     Static Standing Balance PT LTG    Static Standing Balance PT LTG, Date Established  06/06/17  -MB     Static Standing Balance PT LTG, Time to Achieve  1 wk  -MB     Static Standing Balance PT LTG, [place] Level  maximum assist (25% patient effort);2 person assist required  -MB     Static Standing Balance PT LTG, Assist Device  assistive Device   ARJO  -MB     Static Standing Balance PT LTG, Date Goal Reviewed 06/07/17  -AS      Static Standing Balance PT LTG, Outcome goal ongoing  -AS goal ongoing  -MB       User Key  (r) = Recorded By, (t) = Taken By, (c) = Cosigned By    Initials Name Provider Type    AS Nelly Fink, PTA Physical Therapy Assistant    BRYANNA Sterling, PT Physical Therapist          Physical Therapy Education     Title: PT OT SLP Therapies (Active)     Topic: Physical Therapy (Active)     Point:  Mobility training (Active)    Learning Progress Summary    Learner Readiness Method Response Comment Documented by Status   Patient Acceptance E NR  AS 06/07/17 1109 Active    Acceptance E,TB TOMMYKARLA   06/07/17 0558 Done    Acceptance E,D NR D/C planning, home safety, fall precautions, POC progression MB 06/06/17 1349 Active   Family Acceptance E NR  AS 06/07/17 1109 Active    Acceptance E,D NR D/C planning, home safety, fall precautions, POC progression MB 06/06/17 1349 Active               Point: Home exercise program (Active)    Learning Progress Summary    Learner Readiness Method Response Comment Documented by Status   Patient Acceptance E NR  AS 06/07/17 1109 Active    Acceptance E,TB VUKARLA   06/07/17 0558 Done    Acceptance E,D NR D/C planning, home safety, fall precautions, POC progression MB 06/06/17 1349 Active   Family Acceptance E NR  AS 06/07/17 1109 Active    Acceptance E,D NR D/C planning, home safety, fall precautions, POC progression MB 06/06/17 1349 Active               Point: Body mechanics (Active)    Learning Progress Summary    Learner Readiness Method Response Comment Documented by Status   Patient Acceptance E NR  AS 06/07/17 1109 Active    Acceptance E,TB VUKARLA   06/07/17 0558 Done    Acceptance E,D NR D/C planning, home safety, fall precautions, POC progression MB 06/06/17 1349 Active   Family Acceptance E NR  AS 06/07/17 1109 Active    Acceptance E,D NR D/C planning, home safety, fall precautions, POC progression MB 06/06/17 1349 Active               Point: Precautions (Active)    Learning Progress Summary    Learner Readiness Method Response Comment Documented by Status   Patient Acceptance E NR  AS 06/07/17 1109 Active    [facility] 06/07[facility]17 0558 Done    Acceptance E,D NR D/C planning, home safety, fall precautions, POC progression MB 06/06/17 1349 Active   Family Acceptance E NR  AS 06/07/17 1109 Active    Acceptance E,D NR D/C planning, home safety, fall precautions,  POC progression MB 06/06/17 1349 Active                      User Key     Initials Effective Dates Name Provider Type Discipline    AS 06/22/15 -  Nelly Fink PTA Physical Therapy Assistant PT    MB 03/14/16 -  Vero Sterling PT Physical Therapist PT     08/22/16 -  Carole Prado, RN Registered Nurse Nurse                    PT Recommendation and Plan  Anticipated Equipment Needs At Discharge: bedside commode, tub bench, wheelchair (If D/C to home.  N/A if D/C to SNF.)  Anticipated Discharge Disposition: skilled nursing facility  Planned Therapy Interventions: balance training, bed mobility training, gait training, home exercise program, patient/family education, strengthening, transfer training  PT Frequency: daily  Plan of Care Review  Plan Of Care Reviewed With: patient  Progress: no change  Outcome Summary/Follow up Plan: patient with R LE weakness, unable to attempt standing due to safety issues. Used lift to chair with nursing assist. May need to lift to wheelchair and have patient work on W/C mobility.          Outcome Measures       06/07/17 1045 06/06/17 1110       How much help from another person do you currently need...    Turning from your back to your side while in flat bed without using bedrails? 2  -AS 2  -MB     Moving from lying on back to sitting on the side of a flat bed without bedrails? 2  -AS 2  -MB     Moving to and from a bed to a chair (including a wheelchair)? 1  -AS 1  -MB     Standing up from a chair using your arms (e.g., wheelchair, bedside chair)? 1  -AS 1  -MB     Climbing 3-5 steps with a railing? 1  -AS 1  -MB     To walk in hospital room? 1  -AS 1  -MB     AM-PAC 6 Clicks Score 8  -AS 8  -MB     Functional Assessment    Outcome Measure Options AM-PAC 6 Clicks Basic Mobility (PT)  -AS AM-PAC 6 Clicks Basic Mobility (PT)  -MB       User Key  (r) = Recorded By, (t) = Taken By, (c) = Cosigned By    Initials Name Provider Type    AS Nelly Fink PTA Physical Therapy  Assistant    BRYANNA Sterling, PT Physical Therapist           Time Calculation:         PT Charges       06/07/17 1113          Time Calculation    Start Time 1045  -AS      PT Received On 06/07/17  -AS      PT Goal Re-Cert Due Date 06/16/17  -AS      Time Calculation- PT    Total Timed Code Minutes- PT 23 minute(s)  -AS        User Key  (r) = Recorded By, (t) = Taken By, (c) = Cosigned By    Initials Name Provider Type    AS Nelly Fink PTA Physical Therapy Assistant          Therapy Charges for Today     Code Description Service Date Service Provider Modifiers Qty    [id number] HC PT THER PROC EA 15 MIN 6/7/2017 Nelly Fink PTA GP 2          PT G-Codes  PT Professional Judgement Used?: Yes  Outcome Measure Options: AM-PAC 6 Clicks Basic Mobility (PT)  Score: 8  Functional Limitation: Mobility: Walking and moving around  Mobility: Walking and Moving Around Current Status (): At least 80 percent but less than 100 percent impaired, limited or restricted  Mobility: Walking and Moving Around Goal Status (): At least 60 percent but less than 80 percent impaired, limited or restricted    Nelly Fink PTA  6/7/2017

## 2017-06-07 NOTE — PROGRESS NOTES
Continued Stay Note  [facility]     Patient Name: Katherin Ta  MRN: [id number]  Today's Date: 6/7/2017    Admit Date: 6/5/2017          Discharge Plan       06/07/17 1435    Case Management/Social Work Plan    Plan Placement    Additional Comments Spoke with Dr. Chin earlier today who said that Ms. Ta wanted to be discharged home at [facility] with home health. Spoke, at length, with Ms. Ta and her family. They stated that they could not care for her at home, as she is a total lift and cannot stand. They are requesting skilled nursing facility placement. Their first choice is [facility], second choice is [facility] [place] and third choice is [facility]. Called [facility]. Awaiting a call back regarding bed availability. Kaylene Stokes RN x [phone]              Discharge Codes     None        Expected Discharge Date and Time     Expected Discharge Date Expected Discharge Time    Jun 11, 2017             Kaylene Stokes RN

## 2017-06-07 NOTE — DISCHARGE PLACEMENT REQUEST
"Carmen Ta (82 y.o. Female)   Kaylene Stokes, [phone]    Date of Birth Social Security Number Address Home Phone MRN    [id number]  [id number] [id number] [phone] [id number]    Sh[place] Marital Status          None        Admission Date Admission Type Admitting Provider Attending Provider Department, Room/Bed    6/5/17 Emergency Ellen Chin MD Opii, [place], MD [facility] 5G, S552/1    Discharge Date Discharge Disposition Discharge Destination                      Attending Provider: Ellen Chin MD     Allergies:  No Known Allergies    Isolation:  None   Infection:  None   Code Status:  FULL    Ht:  62\" (157.5 cm)   Wt:  160 lb (72.6 kg)    Admission Cmt:  None   Principal Problem:  Somnolence [R40.0]                 Active Insurance as of 6/5/2017     Primary Coverage     Payor Plan Insurance Group Employer/Plan Group    HUMANA MEDICARE REPLACEMENT HUMANA MEDICARE REPL [id number]     Payor Plan Address Payor Plan Phone Number Effective From Effective To    [address] [address] [phone] 1/1/2013     [place] [address]       Subscriber Name Subscriber Birth Date Member ID       CARMEN TA 1934 [id number]                 Emergency Contacts      (Rel.) Home Phone Work Phone Mobile Phone    Maria Ta (Power of ) [phone] -- [phone]    Ashlee Lam (Daughter) -- -- [phone]               History & Physical      Francisco Alan MD at 6/5/2017  3:40 PM              [facility] Medicine Services  HISTORY AND PHYSICAL    Primary Care Physician: Huang Carnes MD    Subjective     Chief Complaint:  Lethargy, R-Sided Weakness    History of Present Illness:   This Lighthart is an 82-year-old  female who presents to [facility] ED this afternoon via EMS from [facility] with complaints of right-sided facial droop, right arm weakness, and chronic " right leg weakness.  Patient family is also at the bedside to provide history and report that she has been doing very well at [facility] without issue until this morning.  Patient tells me that this morning that staff had difficulty waking her up.  Her daughter also reports that she had difficulty getting her to arouse. She has been feeling fine throughout the past few days without issue other than constipation, which she has been struggling with since her last admission. She denies any fevers or chills. No headaches, dizziness, or SOA. No CP or palpitations. No difficulty with swallowing. She denies any difficulty urinating, no blood to note in urine. No abdominal pain.     Of note, the patient was recently admitted from 05/10/2017-05/16/2017 for intractable back pain. She was evaluated by neurosurgery and was not a surgical candidate at that time. She has been at [facility] since discharge and was to have a family meeting today to plan discharge and home needs. She also has a morphine pain pump which she has had for quite some time now.     ED work-up revealed troponin 0.01, K+ 4.7, Cr 0.70 (baseline), CRP 7.75, magnesium 1.9. Lactic 1.6, WBC 17.66, procalcitionin 1.45. Blood cultures pending. Urinalysis unremarkable. MRI Brain wo reveals atrophy of the brain, no acute intracranial abnormality. CT chest wo shows atelectatic changes bilaterally. CT abd/pelvis wo reveals stranding identified surrounding abnormal wall thickening of the ascending colon. Findings suggesting either focal colitis or diverticulitis. No abscess collection or free air. She will be admitted at this time to [facility] under Hospital Medicine Services for further evaluation and treatment.       Review of Systems   Constitutional: Positive for activity change, fatigue and fever. Negative for appetite change and chills.   HENT: Negative.    Eyes: Negative for photophobia and visual disturbance.   Respiratory: Negative for choking, shortness of breath and  wheezing.    Cardiovascular: Negative for chest pain, palpitations and leg swelling.   Gastrointestinal: Positive for constipation. Negative for abdominal pain, diarrhea, nausea and vomiting.   Genitourinary: Positive for difficulty urinating. Negative for dysuria.   Musculoskeletal: Negative for arthralgias, back pain, gait problem, joint swelling and myalgias.   Skin: Positive for wound.   Neurological: Positive for tremors, facial asymmetry and weakness. Negative for dizziness, syncope, light-headedness and headaches.   Psychiatric/Behavioral: Negative.    Otherwise complete ROS performed and negative except as mentioned in the HPI.    Past Medical History:   Diagnosis Date   • GERD (gastroesophageal reflux disease) 5/11/2017   • Gout    • Hyperlipidemia 5/11/2017   • Hypertension 5/11/2017   • Hypothyroid 5/11/2017   • Rheumatoid arthritis    • Spinal stenosis        Past Surgical History:   Procedure Laterality Date   • AMPUTATION DIGIT Right     right middle toe   • BACK SURGERY     • EYE SURGERY     • HAND SURGERY     • JOINT REPLACEMENT      left knee   • PERICARDIAL WINDOW         Family History   Problem Relation Age of Onset   • Hypertension Mother    • Arthritis Mother    • Heart attack Father        Social History     Social History   • Marital status:      Spouse name: N/A   • Number of children: N/A   • Years of education: N/A     Occupational History   • Not on file.     Social History Main Topics   • Smoking status: Never Smoker   • Smokeless tobacco: Not on file   • Alcohol use No   • Drug use: No   • Sexual activity: Not on file     Other Topics Concern   • Not on file     Social History Narrative       Medications:  Prescriptions Prior to Admission   Medication Sig Dispense Refill Last Dose   • acetaminophen (TYLENOL) 500 MG tablet Take 500 mg by mouth Every 6 (Six) Hours As Needed for Mild Pain (1-3).      • atorvastatin (LIPITOR) 10 MG tablet Take 10 mg by mouth Every Night.      •  "lisinopril (PRINIVIL,ZESTRIL) 10 MG tablet Take 10 mg by mouth Daily.      • pantoprazole (PROTONIX) 20 MG EC tablet Take 20 mg by mouth Every Morning.      • allopurinol (ZYLOPRIM) 100 MG tablet Take 200 mg by mouth Daily.      • amLODIPine (NORVASC) 5 MG tablet Take 1 tablet by mouth Daily.      • Apoaequorin (PREVAGEN PO) Take 1 tablet by mouth Daily.      • aspirin 81 MG tablet Take 81 mg by mouth Daily.      • baclofen (LIORESAL) 10 MG tablet Take 10 mg by mouth 4 (Four) Times a Day.      • benazepril (LOTENSIN) 10 MG tablet Take 10 mg by mouth Daily.      • castor oil-balsam peru (VENELEX) ointment Apply 1 application topically Every 12 (Twelve) Hours.      • cetirizine (zyrTEC) 10 MG tablet Take 1 tablet by mouth Daily.      • colchicine 0.6 MG tablet Take 0.6 mg by mouth Daily.      • docusate sodium (COLACE) 100 MG capsule Take 100 mg by mouth 2 (Two) Times a Day.      • gabapentin (NEURONTIN) 800 MG tablet Take 1,600 mg by mouth Every 6 (Six) Hours.      • levothyroxine (SYNTHROID, LEVOTHROID) 75 MCG tablet Take 75 mcg by mouth Daily.      • melatonin 5 MG sublingual tablet sublingual tablet Place 1 tablet under the tongue At Night As Needed (sleep).  0    • MORPHINE SULFATE IV Infuse 2.399 mg into a venous catheter Daily. MORPHINE PUMP 2.399 MG PER DAY  IMPLANT INTERNAL LEFT ABDOMEN      • Multiple Vitamin (MULTI VITAMIN PO) Take 1 tablet by mouth Daily.      • Omega-3 Fatty Acids (FISH OIL) 1000 MG capsule capsule Take 1,000 mg by mouth Daily With Breakfast.         Reviewed and reconciled on admission.     Allergies:  No Known Allergies      Objective     Physical Exam:  Vital Signs: /66 (BP Location: Right arm, Patient Position: Lying)  Pulse 75 Comment: Simultaneous filing. User may be unaware of other data.  Temp 98.3 °F (36.8 °C) (Oral)   Resp 16  Ht 62\" (157.5 cm)  Wt 160 lb (72.6 kg)  SpO2 92%  BMI 29.26 kg/m2  Physical Exam   Gen.: Resting in bed in no acute distress, breathing " without any labor and speaking without difficulty.  Neurologic exam: Awake, alert, oriented ×3.  She is in good mood and very cooperative.  Severe weakness of right lower extremity.  HEENT: PERRLA.  Neck: Supple, no cervical lymphadenopathies palpable.  Lungs: Clear to auscultation bilaterally, breathing is without labor, good air movement.  Cardiovascular: S1 and S2 present and normal, regular rate and rhythm, no murmur gallop or rub audible.  GI: Obese, Soft, nontender, not distended, bowel sounds hyperactive.  Extremities: No edema lower extremities.      Results Reviewed:    Results from last 7 days  Lab Units 06/05/17  1012   WBC 10*3/mm3 17.66*   HEMOGLOBIN g/dL 12.5   PLATELETS 10*3/mm3 162       Results from last 7 days  Lab Units 06/05/17  1012   SODIUM mmol/L 139   POTASSIUM mmol/L 4.7   TOTAL CO2 mmol/L 29.0   CREATININE mg/dL 0.70   GLUCOSE mg/dL 102*   CALCIUM mg/dL 9.8     EXAMINATION: MRI BRAIN WO CONTRAST- 06/05/2017      INDICATION: R40.0-Somnolence; A41.9-Sepsis, unspecified organism; mental  status change.      TECHNIQUE: Routine multiplanar imaging was obtained of the brain without  the administration of gadolinium contrast.      COMPARISON: NONE      FINDINGS: There is atrophy identified of the brain. Signal changes seen  scattered throughout the periventricular and subcortical white matter  suggesting chronic small vessel ischemic change. Visualized paranasal  sinuses are clear. Globes and orbits are intact. The mastoid air cells  are patent. No cerebellopontine angle mass identified. Pituitary and  sella are unremarkable. Craniovertebral junction is preserved. No  evidence of restricted diffusion to suggest evidence of an acute  ischemic insult. Flow voids are preserved in the major intracranial  vessels.      IMPRESSION:  Atrophy of the brain with some chronic small vessel ischemic  changes seen in the periventricular and subcortical white matter. No  acute intracranial abnormality is  identified.      D: 06/05/2017  E: 06/05/2017    EXAMINATION: CT ABDOMEN PELVIS WO CONTRAST-, CT CHEST WO CONTRAST-  06/05/2017      INDICATION: fever; R40.0-Somnolence; A41.9-Sepsis, unspecified organism;  focal weakness      TECHNIQUE: Multiple axial CT imaging was obtained of the chest, abdomen  and pelvis without the administration of oral or intravenous contrast.      The radiation dose reduction device was turned on for each scan per the  ALARA (As Low as Reasonably Achievable) protocol.      COMPARISON: 05/10/2017      FINDINGS:       CHEST: Thyroid is homogeneous in appearance. No mediastinal mass or  adenopathy. The cardiac chambers are within normal limits. No  pericardial effusion. Some atelectatic changes identified in the lung  bases bilaterally. The lung apices are clear. No parenchymal  consolidation, pulmonary mass or nodule present. Degenerative changes  seen within the spine. Calcification of subcarinal region suggesting old  healed granulomatous disease.      ABDOMEN: There is mild dilatation identified of the renal collecting  systems bilaterally. Distention identified of the bladder. There is mild  stranding identified of the kidneys. No renal or ureteral stone  identified. There is some stranding identified in the right flank.  Abnormal wall thickening of the ascending colon. Findings may suggest a  focal diverticulitis or colitis. There is a small diverticula identified  in this area. No definite extraluminal air. There is no abscess  collection identified. Fluid in the right paracolic gutter. No  significant fluid in the pelvis. No gallstones identified. Diverticula  identified throughout the remainder of the colon. Pancreas is  homogeneous.      PELVIS: Mild distention of the bladder. Stool within the colon. Pelvic  organs are otherwise unremarkable. No pelvic adenopathy. No free fluid  or free air. Degenerative changes seen within the spine and pelvis.      IMPRESSION:  Atelectatic changes  in lung bases bilaterally. Some  stranding identified surrounding abnormal wall thickening of the  ascending colon. Findings suggesting either focal colitis or  diverticulitis. No abscess collection or free air.      D: 06/05/2017  E: 06/05/2017  I have personally reviewed and interpreted available lab data, radiology studies and ECG obtained at time of admission.     Assessment / Plan     Assessment/Problem List:   Hospital Problem List     Somnolence        *Somnolence and difficulty to arouse this morning.  Etiology is not clear,  however symptoms have resolved.  Currently patient is awake, alert,  and oriented.    * fever and leukocytosis. No source of infection known at this point but ct of abdomin shows some fat stranding suggestive of colitis or diveticulitis.    * HTN    * HLP    * spinal stenosis. Pt was admitted recently and was evaluated by neurosurgery. Currently pt has very minimal motor function in her right LE.    * hypothyroidism.    Plan:  - Admit to telemetry  - Gentle IV hydration.  - oxygen support as needed to keep saturations >92%    -- IS every two hours   - continue vancomycin (pharmacy to dose) and zosyn.  - [facility] recommendations for healing coccyx pressure ulcer   -- air bed ordered  - suppository now   -- manual disimpaction if needed  - anchor alexander catheter for acute urinary retention, after urination of approximately 350ml PT +ml   -- cath care per protocol    -- resend urinalysis from clean cath  - fall precautions  - regular diet, encourage oral intake   - repositioning assistance every two hours  - OOB to chair for meals please   - PT consult   - repeat labs in AM     DVT prophylaxis: TEDS/ SCDS, subq heparin  Code Status: FULL   Admission Status: Patient will be admitted to Inpatient.     Plan of care and treatment discussed and developed with Dr. Alan.     Iona Ramos, RN EXTENDER 06/05/17 3:40 PM  Sincerely and examined at the bedside.  Abdomen the above physical  exam and assessment.  I have reviewed and edited the rest of the above to reflect my medical opinion.  I have discussed the findings and plan of care with the patient and her family at the bedside.         Electronically signed by Francisco Alan MD at 6/5/2017  9:18 PM        Hospital Medications (active)       Dose Frequency Start End    acetaminophen (TYLENOL) tablet 500 mg 500 mg Every 6 Hours PRN 6/5/2017     Sig - Route: Take 1 tablet by mouth Every 6 (Six) Hours As Needed for Mild Pain (1-3). - Oral    allopurinol (ZYLOPRIM) tablet 200 mg 200 mg Daily 6/5/2017     Sig - Route: Take 2 tablets by mouth Daily. - Oral    amLODIPine (NORVASC) tablet 5 mg 5 mg Every 24 Hours Scheduled 6/5/2017     Sig - Route: Take 1 tablet by mouth Daily. - Oral    amoxicillin-clavulanate (AUGMENTIN) 875-125 MG per tablet 1 tablet 1 tablet Every 12 Hours Scheduled 6/7/2017     Sig - Route: Take 1 tablet by mouth Every 12 (Twelve) Hours. - Oral    aspirin chewable tablet 81 mg 81 mg Daily 6/5/2017     Sig - Route: Chew 1 tablet Daily. - Oral    atorvastatin (LIPITOR) tablet 10 mg 10 mg Nightly 6/5/2017     Sig - Route: Take 1 tablet by mouth Every Night. - Oral    baclofen (LIORESAL) tablet 10 mg 10 mg Every 6 Hours Scheduled 6/5/2017     Sig - Route: Take 1 tablet by mouth Every 6 (Six) Hours. - Oral    benazepril (LOTENSIN) tablet 10 mg 10 mg Daily 6/5/2017     Sig - Route: Take 2 tablets by mouth Daily. - Oral    castor oil-balsam peru (VENELEX) ointment  Every 12 Hours Scheduled 6/5/2017     Sig - Route: Apply  topically Every 12 (Twelve) Hours. - Topical    Cosign for Ordering: Accepted by Zabrina Robles MD on 6/7/2017  1:25 AM    colchicine tablet 0.6 mg 0.6 mg Daily 6/5/2017     Sig - Route: Take 1 tablet by mouth Daily. - Oral    docusate sodium (COLACE) capsule 100 mg 100 mg 2 Times Daily 6/5/2017     Sig - Route: Take 1 capsule by mouth 2 (Two) Times a Day. - Oral    gabapentin (NEURONTIN) capsule 300 mg 300 mg Every 8  "Hours Scheduled 6/5/2017     Sig - Route: Take 1 capsule by mouth Every 8 (Eight) Hours. - Oral    heparin (porcine) 5000 UNIT/ML injection 5,000 Units 5,000 Units Every 8 Hours Scheduled 6/5/2017     Sig - Route: Inject 1 mL under the skin Every 8 (Eight) Hours. - Subcutaneous    levothyroxine (SYNTHROID, LEVOTHROID) tablet 75 mcg 75 mcg Every Early Morning 6/6/2017     Sig - Route: Take 1 tablet by mouth Every Morning. - Oral    lisinopril (PRINIVIL,ZESTRIL) tablet 10 mg 10 mg Daily 6/5/2017     Sig - Route: Take 1 tablet by mouth Daily. - Oral    melatonin sublingual tablet 5 mg 5 mg Nightly PRN 6/5/2017     Sig - Route: Place 1 tablet under the tongue At Night As Needed (sleep). - Sublingual    Morphine iv pump  Continuous 6/5/2017     Sig - Route: Continuous. - Does not apply    ondansetron (ZOFRAN) injection 4 mg 4 mg Every 6 Hours PRN 6/5/2017     Sig - Route: Infuse 2 mL into a venous catheter Every 6 (Six) Hours As Needed for Nausea or Vomiting. - Intravenous    Linked Group 1:  \"Or\" Linked Group Details        ondansetron (ZOFRAN) tablet 4 mg 4 mg Every 6 Hours PRN 6/5/2017     Sig - Route: Take 1 tablet by mouth Every 6 (Six) Hours As Needed for Nausea or Vomiting. - Oral    Linked Group 1:  \"Or\" Linked Group Details        pantoprazole (PROTONIX) EC tablet 40 mg 40 mg Every Early Morning 6/6/2017     Sig - Route: Take 1 tablet by mouth Every Morning. - Oral    sodium chloride 0.9 % flush 1-10 mL 1-10 mL As Needed 6/5/2017     Sig - Route: Infuse 1-10 mL into a venous catheter As Needed for Line Care. - Intravenous    sodium chloride 0.9 % infusion 100 mL/hr Continuous 6/5/2017     Sig - Route: Infuse 100 mL/hr into a venous catheter Continuous. - Intravenous    ! Pt scheduled for a vancomycin trough on 6/7 at 1030 prior to 1100 dose, please hold until notified from pharmacy (Discontinued)  Once 6/7/2017 6/7/2017    Sig - Route: 1 (One) Time. - Does not apply    Reason for Discontinue: Therapy completed " "   Pharmacy to dose vancomycin (Discontinued)  Continuous PRN 6/5/2017 6/7/2017    Sig - Route: Continuous As Needed for Consult (loading dose given in ED). - Does not apply    Reason for Discontinue: Therapy completed    piperacillin-tazobactam (ZOSYN) 3.375 g/100 mL 0.9% NS IVPB (mbp) (Discontinued) 3.375 g Every 6 Hours 6/5/2017 6/7/2017    Sig - Route: Infuse 100 mL into a venous catheter Every 6 (Six) Hours. - Intravenous    vancomycin (VANCOCIN) in iso-osmotic dextrose IVPB 1 g (premix) 200 mL (Discontinued) 1,000 mg Every 24 Hours 6/6/2017 6/7/2017    Sig - Route: Infuse 200 mL into a venous catheter Daily. - Intravenous             Physician Progress Notes (last 24 hours) (Notes from 6/6/2017  2:52 PM through 6/7/2017  2:52 PM)      Ellen Chin MD at 6/7/2017  6:47 AM  Version 1 of 1               HOSPITALIST DAILY PROGRESS NOTE    Chief Complaint: Lethargy/somnolence    Subjective   SUBJECTIVE/OVERNIGHT EVENTS   No acute events overnight, patient states that she hada good night, this morning she is alert and awake, denies any fevers or chills    Review of Systems:  Gen-no fevers, no chills  CV-no chest pain, no palpitations  Resp-no cough, no dyspnea  GI-no N/V/D, no abd pain    Objective   OBJECTIVE   I have reviewed the vital signs.  /69 (BP Location: Right arm, Patient Position: Lying)  Pulse 87  Temp 98.7 °F (37.1 °C) (Oral)   Resp 16  Ht 62\" (157.5 cm)  Wt 160 lb (72.6 kg)  SpO2 93%  BMI 29.26 kg/m2    Physical Exam:  Gen-no acute distress, comfortable  CV-RRR, S1 S2 normal, no m/r/g  Resp-CTAB, no wheezes  Abd-soft, NT, ND, +BS  Ext-no edema  Neuro-A&Ox3, no focal deficits  Psych-appropriate mood and affect    Results:  I have reviewed the labs, culture data, radiology results, and diagnostic studies.      Results from last 7 days  Lab Units 06/07/17  0520 06/06/17  0507 06/05/17  1012   WBC 10*3/mm3 7.96 12.97* 17.66*   HEMOGLOBIN g/dL 10.8* 11.2* 12.5   HEMATOCRIT % 33.6* 35.2 38.6 "   PLATELETS 10*3/mm3 147* 155 162       Results from last 7 days  Lab Units 06/07/17  0520   SODIUM mmol/L 143   POTASSIUM mmol/L 3.7   CHLORIDE mmol/L 111*   TOTAL CO2 mmol/L 30.0   BUN mg/dL 12   CREATININE mg/dL 0.60   GLUCOSE mg/dL 93   CALCIUM mg/dL 9.8       Culture Data:  Cultures:    Blood Culture   Date Value Ref Range Status   06/05/2017 No growth at 24 hours  Preliminary   06/05/2017 No growth at 24 hours  Preliminary         Radiology Results:  Imaging Results (last 24 hours)     Procedure Component Value Units Date/Time    XR Chest 1 View [[id number]] Collected:  06/05/17 1129     Updated:  06/06/17 1212    Narrative:       EXAMINATION: XR CHEST 1 VW- 06/05/2017     INDICATION: Weak/Dizzy/AMS triage protocol      COMPARISON: NONE     FINDINGS: Portable chest reveals heart to be enlarged. Lung volumes are  low. Mild increased markings at the left lung base. Calcified granuloma  identified in the right upper lobe. Degenerative changes seen within the  spine with vascular calcifications present.           Impression:       Heart is enlarged with prominence of the pulmonary  vascularity bilaterally. Lung volumes are low.     D:  06/05/2017  E:  06/05/2017     This report was finalized on 6/6/2017 12:10 PM by Dr. Mirela Haynes MD.       MRI Brain Without Contrast [[id number]] Collected:  06/05/17 1325     Updated:  06/06/17 1215    Narrative:       EXAMINATION: MRI BRAIN WO CONTRAST- 06/05/2017     INDICATION: R40.0-Somnolence; A41.9-Sepsis, unspecified organism; mental  status change.     TECHNIQUE: Routine multiplanar imaging was obtained of the brain without  the administration of gadolinium contrast.     COMPARISON: NONE     FINDINGS: There is atrophy identified of the brain. Signal changes seen  scattered throughout the periventricular and subcortical white matter  suggesting chronic small vessel ischemic change. Visualized paranasal  sinuses are clear. Globes and orbits are intact. The mastoid air  cells  are patent. No cerebellopontine angle mass identified.  Pituitary and  sella are unremarkable. Craniovertebral junction is preserved. No  evidence of restricted diffusion to suggest evidence of an acute  ischemic insult. Flow voids are preserved in the major intracranial  vessels.       Impression:       Atrophy of the brain with some chronic small vessel ischemic  changes seen in the periventricular and subcortical white matter. No  acute intracranial abnormality is identified.     D:  06/05/2017  E:  06/05/2017         This report was finalized on 6/6/2017 12:13 PM by Dr. Mirela Haynes MD.       CT Abdomen Pelvis Without Contrast [[id number]] Collected:  06/05/17 1416     Updated:  06/06/17 1215    Narrative:       EXAMINATION: CT ABDOMEN PELVIS WO CONTRAST-, CT CHEST WO CONTRAST-  06/05/2017     INDICATION: fever; R40.0-Somnolence; A41.9-Sepsis, unspecified organism;  focal weakness     TECHNIQUE: Multiple axial CT imaging was obtained of the chest, abdomen  and pelvis without the administration of oral or intravenous contrast.     The radiation dose reduction device was turned on for each scan per the  ALARA (As Low as Reasonably Achievable) protocol.     COMPARISON: 05/10/2017     FINDINGS:      CHEST: Thyroid is homogeneous in appearance. No mediastinal mass or  adenopathy. The cardiac chambers are within normal limits. No  pericardial effusion. Some atelectatic changes identified in the lung  bases bilaterally. The lung apices are clear. No parenchymal  consolidation, pulmonary mass or nodule present. Degenerative changes  seen within the spine. Calcification of subcarinal region suggesting old  healed granulomatous disease.     ABDOMEN: There is mild dilatation identified of the renal collecting  systems bilaterally. Distention identified of the bladder. There is mild  stranding identified of the kidneys. No renal or ureteral stone  identified. There is some stranding identified in the right  flank.  Abnormal wall thickening of the ascending colon. Findings may suggest a  focal diverticulitis or colitis. There is a small diverticula identified  in this area. No definite extraluminal air. There is no abscess  collection identified. Fluid in the right paracolic gutter. No  significant fluid in the pelvis. No gallstones identified. Diverticula  identified throughout the remainder of the colon. Pancreas is  homogeneous.     PELVIS: Mild distention of the bladder. Stool within the colon. Pelvic  organs are otherwise unremarkable. No pelvic adenopathy. No free fluid  or free air. Degenerative changes seen within the spine and pelvis.       Impression:       Atelectatic changes in lung bases bilaterally. Some  stranding identified surrounding abnormal wall thickening of the  ascending colon. Findings suggesting either focal colitis or  diverticulitis. No abscess collection or free air.     D:  06/05/2017  E:  06/05/2017        This report was finalized on 6/6/2017 12:13 PM by Dr. Mirela Haynes MD.       CT Chest Without Contrast [[id number]] Collected:  06/05/17 1416     Updated:  06/06/17 1215    Narrative:       EXAMINATION: CT ABDOMEN PELVIS WO CONTRAST-, CT CHEST WO CONTRAST-  06/05/2017     INDICATION: fever; R40.0-Somnolence; A41.9-Sepsis, unspecified organism;  focal weakness     TECHNIQUE: Multiple axial CT imaging was obtained of the chest, abdomen  and pelvis without the administration of oral or intravenous contrast.     The radiation dose reduction device was turned on for each scan per the  ALARA (As Low as Reasonably Achievable) protocol.     COMPARISON: 05/10/2017     FINDINGS:      CHEST: Thyroid is homogeneous in appearance. No mediastinal mass or  adenopathy. The cardiac chambers are within normal limits. No  pericardial effusion. Some atelectatic changes identified in the lung  bases bilaterally. The lung apices are clear. No parenchymal  consolidation, pulmonary mass or nodule present.  Degenerative changes  seen within the spine. Calcification of subcarinal region suggesting old  healed granulomatous disease.     ABDOMEN: There is mild dilatation identified of the renal collecting  systems bilaterally. Distention identified of the bladder. There is mild  stranding identified of the kidneys. No renal or ureteral stone  identified. There is some stranding identified in the right flank.  Abnormal wall thickening of the ascending colon. Findings may suggest a  focal diverticulitis or colitis. There is a small diverticula identified  in this area. No definite extraluminal air. There is no abscess  collection identified. Fluid in the right paracolic gutter. No  significant fluid in the pelvis. No gallstones identified. Diverticula  identified throughout the remainder of the colon. Pancreas is  homogeneous.     PELVIS: Mild distention of the bladder. Stool within the colon. Pelvic  organs are otherwise unremarkable. No pelvic adenopathy. No free fluid  or free air. Degenerative changes seen within the spine and pelvis.       Impression:       Atelectatic changes in lung bases bilaterally. Some  stranding identified surrounding abnormal wall thickening of the  ascending colon. Findings suggesting either focal colitis or  diverticulitis. No abscess collection or free air.     D:  06/05/2017  E:  06/05/2017        This report was finalized on 6/6/2017 12:13 PM by Dr. Mirela Haynes MD.             I have reviewed the medications.    Assessment/Plan   ASSESSMENT/PLAN    Principal Problem:    Somnolence  Active Problems:    Hypertension    Hypothyroid    Hyperlipidemia    GERD (gastroesophageal reflux disease)    Weakness    82 yof, currently at [facility] after being admitted and d/c from [facility] for non-operable intractable back pain (5/10-5/16) she developed right-sided facial droop, right arm weakness, but presented for difficulty to arouse all that have since resolved, etiology of her symptoms are unknown,  but suspect drug induced. However, on presentation here patient had fever and tachycardia with some leukocytosis admited with sepsis source unknown? Colitis ?diverticulitis per imaging     Plan  - MRI with no intracranial abnormalities  - UA wnl, blood cultures sent NGTD  - started on abx, will continue, de-escalate quickly, consult ID  - CT abd suggestive of either focal colitis or diverticulitis.  - leukocytosis improving  - repeat labs in AM     Dispo: will anticipate d/c to home with HH/PT in 1-2 days    Ellen Chin MD  06/07/17  9:15 AM             Electronically signed by Ellen Chin MD at 6/7/2017  9:16 AM           Physical Therapy Notes (last 72 hours) (Notes from 6/4/2017  2:52 PM through 6/7/2017  2:52 PM)      Vero Sterling, PT at 6/6/2017  2:00 PM  Version 1 of 1         Problem: Patient Care Overview (Adult)  Goal: Plan of Care Review  Outcome: Ongoing (interventions implemented as appropriate)    06/06/17 1350   Coping/Psychosocial Response Interventions   Plan Of Care Reviewed With patient;family   Patient Care Overview   Progress progress toward functional goals as expected   Outcome Evaluation   Outcome Summary/Follow up Plan PT vesnaal completed. Pt adm from [facility] and was preparing for D/C to home w/ assist and HHPT. Pt. presents w/ evolving sx. of significant R LE weakness, dec sensation, balance deficits, gait instability, and requires dep A x 2 for stand-pivot transfer to chair. Recommended to nsg that patient be placed in lift room. Pt. will benefit from skilled IPPT to maximize patient's safety and ind. w/ functional mobility. Recommend D/C to SNF to further progress safety and ind. w/ functional mobility prior to D/C to home.         Problem: Inpatient Physical Therapy  Goal: Bed Mobility Goal LTG- PT  Outcome: Ongoing (interventions implemented as appropriate)    06/06/17 1350   Bed Mobility PT LTG   Bed Mobility PT LTG, Date Established 06/06/17   Bed Mobility PT LTG, Time to  Achieve 1 wk   Bed Mobility PT LTG, Activity Type supine to sit/sit to supine;roll left/roll right   Bed Mobility PT LTG, [place] Level moderate assist (50% patient effort);2 person assist required   Bed Mobility PT LTG, Outcome goal ongoing       Goal: Transfer Training Goal 1 LTG- PT  Outcome: Ongoing (interventions implemented as appropriate)    06/06/17 1350   Transfer Training PT LTG   Transfer Training PT LTG, Date Established 06/06/17   Transfer Training PT LTG, Time to Achieve 1 wk   Transfer Training PT LTG, Activity Type bed to chair /chair to bed;sit to stand/stand to sit   Transfer Training PT LTG, [place] Level maximum assist (25% patient effort);2 person assist required   Transfer Training PT LTG, Assist Device walker, rolling  (ARJO)   Transfer Training PT LTG, Outcome goal ongoing       Goal: Gait Training Goal LTG- PT  Outcome: Ongoing (interventions implemented as appropriate)    06/06/17 1350   Gait Training PT LTG   Gait Training Goal PT LTG, Date Established 06/06/17   Gait Training Goal PT LTG, Time to Achieve 1 wk   Gait Training Goal PT LTG, [place] Level maximum assist (25% patient effort);2 person assist required   Gait Training Goal PT LTG, Assist Device (ARJO)   Gait Training Goal PT LTG, Distance to Achieve 5   Gait Training Goal PT LTG, Outcome goal ongoing       Goal: Static Standing Balance Goal LTG- PT  Outcome: Ongoing (interventions implemented as appropriate)    06/06/17 1350   Static Standing Balance PT LTG   Static Standing Balance PT LTG, Date Established 06/06/17   Static Standing Balance PT LTG, Time to Achieve 1 wk   Static Standing Balance PT LTG, [place] Level maximum assist (25% patient effort);2 person assist required   Static Standing Balance PT LTG, Assist Device assistive Device  (ARJO)   Static Standing Balance PT LTG, Outcome goal ongoing              Electronically signed by Vero Sterling, PT at 6/6/2017  2:00 PM      Vero Sterling, PT  at 2017  2:04 PM  Version 1 of 1         Acute Care - Physical Therapy Initial Evaluation   [place]     Patient Name: Katherin Ta  : 1934  MRN: [id number]  Today's Date: 2017   Onset of Illness/Injury or Date of Surgery Date: 17  Date of Referral to PT: 17  Referring Physician: KATHY Robles MD      Admit Date: 2017     Visit Dx:    ICD-10-CM ICD-9-CM   1. Somnolence R40.0 780.09   2. Sepsis, due to unspecified organism A41.9 038.9     995.91   3. Impaired functional mobility, balance, gait, and endurance Z74.09 V49.89     Patient Active Problem List   Diagnosis   • Rheumatoid arthritis   • Spinal stenosis   • Hypertension   • Hypothyroid   • Hyperlipidemia   • GERD (gastroesophageal reflux disease)   • Intractable back pain   • Weakness   • Somnolence     Past Medical History:   Diagnosis Date   • GERD (gastroesophageal reflux disease) 2017   • Gout    • Hyperlipidemia 2017   • Hypertension 2017   • Hypothyroid 2017   • Rheumatoid arthritis    • Spinal stenosis      Past Surgical History:   Procedure Laterality Date   • AMPUTATION DIGIT Right     right middle toe   • BACK SURGERY     • EYE SURGERY     • HAND SURGERY     • JOINT REPLACEMENT      left knee   • PERICARDIAL WINDOW            PT ASSESSMENT (last 72 hours)      PT Evaluation       17 1110 17 1014    Rehab Evaluation    Document Type evaluation  -MB     Subjective Information agree to therapy;no complaints  -MB     Patient Effort, Rehab Treatment good  -MB     Symptoms Noted During/After Treatment none  -MB     General Information    Patient Profile Review yes  -MB     Onset of Illness/Injury or Date of Surgery Date 17  -MB     Referring Physician KATHY Robles MD  -MB     General Observations Pt. supine w/ IV L UE intact, telemetry, pulse ox, alexander, on RA.  Nsg consent for PT.  Pt's sister present at bedside.  -MB     Pertinent History Of Current Problem Pt. adm via EMS from [facility] w/ R  side facial droop, R UE weakness, chronic R LE weakness, and somnolence.  Pt. recently adm. to [facility] w/ intractable back pain; pt. not a candidate for spinal sx.  Pt. has h/o spinal stenosis and has morphine pump.  -MB     Precautions/Limitations fall precautions   morphine pump  -MB     Prior Level of Function independent:;bed mobility;max assist:;transfer   Pt. was ambulatory approx 1 month ago; SB transfers at [facility].  -MB     Equipment Currently Used at Home walker, rolling   transport chair  -MB     Plans/Goals Discussed With patient and family;agreed upon  -MB     Risks Reviewed patient and family:;LOB;nausea/vomiting;dizziness;increased discomfort;change in vital signs;lines disloged  -MB     Benefits Reviewed patient and family:;improve function;increase independence;increase strength;increase balance;decrease pain;decrease risk of DVT;improve skin integrity;increase knowledge  -MB     Barriers to Rehab previous functional deficit;medically complex  -MB     Living Environment    Lives With child(bev), adult  -MB child(bev), adult  -    Living Arrangements house  -MB house  -SP    Home Accessibility stairs to enter home  -MB     Number of Stairs to Enter Home 2  -MB     Stair Railings at Home outside, present on left side  -MB     Living Environment Comment Prior to recent hospitalization at [facility] then to [facility], pt. lived w/ dtr and was ambulating household distances w/ RW.  Pt. non-ambulatory and performing sliding board transfers at [facility], PTA.  -MB Daughter lives with the pt. The pt came for [facility]. Prior to recent illness she  required assist with ADLs and used a r walker.  -SP    Clinical Impression    Date of Referral to PT 06/05/17  -MB     PT Diagnosis Functional decline; weakness; Gait instability  -MB     Functional Level At Time Of Evaluation Pt. dependent for stand-pivot transfer to chair.  -MB     Patient/Family Goals Statement Pt.'s goal is to D/C to home w/ assist and HHPT.  -MB     Criteria for Skilled  Therapeutic Interventions Met yes;treatment indicated  -MB     Rehab Potential good, to achieve stated therapy goals  -MB     Vital Signs    Pre Systolic BP Rehab 146  -MB     Pre Treatment Diastolic BP 84  -MB     Pretreatment Heart Rate (beats/min) 88  -MB     Pre SpO2 (%) 95  -MB     O2 Delivery Pre Treatment room air  -MB     Pre Patient Position Supine  -MB     Intra Patient Position Standing  -MB     Post Patient Position Sitting  -MB     Pain Assessment    Pain Assessment No/denies pain  -MB     Vision Assessment/Intervention    Visual Impairment WFL with corrective lenses  -MB     Cognitive Assessment/Intervention    Current Cognitive/Communication Assessment functional  -MB     Orientation Status oriented x 4  -MB     Follows Commands/Answers Questions 100% of the time;able to follow single-step instructions;needs cueing;needs increased time  -MB     Personal Safety WNL/WFL  -MB     Personal Safety Interventions fall prevention program maintained;gait belt;muscle strengthening facilitated;nonskid shoes/slippers when out of bed  -MB     ROM (Range of Motion)    General ROM no range of motion deficits identified  -MB     MMT (Manual Muscle Testing)    General MMT Assessment lower extremity strength deficits identified  -MB     General MMT Assessment Detail L LE grossly 4-/5.  R LE 1/5.   defer UEs to OT.  -MB     Bed Mobility, Assessment/Treatment    Bed Mobility, Assistive Device bed rails;draw sheet  -MB     Bed Mobility, Roll Left, [place] maximum assist (25% patient effort);verbal cues required  -MB     Bed Mobility, Roll Right, [place] maximum assist (25% patient effort);verbal cues required  -MB     Bed Mob, Supine to Sit, [place] maximum assist (25% patient effort);2 person assist required;verbal cues required  -MB     Bed Mob, Sit to Supine, [place] not tested  -MB     Bed Mobility, Safety Issues decreased use of legs for bridging/pushing;decreased use of arms for pushing/pulling   -MB     Bed Mobility, Impairments sensation decreased;strength decreased;impaired balance;coordination impaired  -MB     Bed Mobility, Comment Pt. rolled L/R for hygiene.  Pt. incontinent of bowel requiring dep A for hygiene.  VCs for sequencing and assist at hips to facilitate.   -MB     Transfer Assessment/Treatment    Transfers, Bed-Chair [place] dependent (less than 25% patient effort);2 person assist required;verbal cues required  -MB     Transfers, Chair-Bed [place] not tested  -MB     Transfers, Bed-Chair-Bed, Assist Device --   gait belt, B UE support  -MB     Transfer, Safety Issues balance decreased during turns;sequencing ability decreased;step length decreased;weight-shifting ability decreased;knees buckling  -MB     Transfer, Impairments sensation decreased;strength decreased;impaired balance;motor control impaired  -MB     Transfer, Comment Pt. transferred bed -> chair via stand-pivot w/ dep A x 2. Pt. unable to stand upright, shift weight, and advance LEs.  Recommended to [facility] that patient be moved to lift room.    -MB     Gait Assessment/Treatment    Gait, [place] Level not appropriate to assess;unable to perform  -MB     Motor Skills/Interventions    Additional Documentation Balance Skills Training (Group)  -MB     Balance Skills Training    Sitting-Level of Assistance Minimum assistance  -MB     Sitting-Balance Support Feet supported;Right upper extremity supported;Left upper extremity supported  -MB     Sitting-Balance Activities Trunk control activities;Forward lean;Lateral lean  -MB     Sitting # of Minutes 10  -MB     Standing-Level of Assistance Dependent;x2  -MB     Static Standing Balance Support Right upper extremity supported;Left upper extremity supported   gait belt  -MB     Sensory Assessment/Intervention    Light Touch LLE;RLE  -MB     LLE Light Touch mild impairment  -MB     RLE Light Touch severe impairment  -MB     Positioning and Restraints    Pre-Treatment Position  in bed  -MB     Post Treatment Position chair  -MB     In Chair notified nsg;reclined;call light within reach;encouraged to call for assist;exit alarm on;with family/caregiver;waffle cushion;on mechanical lift sling;legs elevated;heels elevated  -MB       06/05/17 1411       General Information    Equipment Currently Used at Home walker, rolling;wheelchair  -ANGY     Living Environment    Lives With child(bev), adult  -ANGY     Living Arrangements house  -ANGY     Home Accessibility stairs to enter home  -     Number of Stairs to Enter Home 2  -ANGY     Stair Railings at Home outside, present on left side  -     Type of Financial/Environmental Concern none  -ANGY     Transportation Available car  -ANGY       User Key  (r) = Recorded By, (t) = Taken By, (c) = Cosigned By    Initials Name Provider Type    BRYANNA Sterling, PT Physical Therapist    SP Corinne Vazquez, RN Case Manager    ANGY Diehl, RN Registered Nurse          Physical Therapy Education     Title: PT OT SLP Therapies (Active)     Topic: Physical Therapy (Active)     Point: Mobility training (Active)    Learning Progress Summary    Learner Readiness Method Response Comment Documented by Status   Patient Acceptance E,D NR D/C planning, home safety, fall precautions, POC progression MB 06/06/17 1349 Active   Family Acceptance E,D NR D/C planning, home safety, fall precautions, POC progression MB 06/06/17 1349 Active               Point: Home exercise program (Active)    Learning Progress Summary    Learner Readiness Method Response Comment Documented by Status   Patient Acceptance E,D NR D/C planning, home safety, fall precautions, POC progression MB 06/06/17 1349 Active   Family Acceptance E,D NR D/C planning, home safety, fall precautions, POC progression MB 06/06/17 1349 Active               Point: Body mechanics (Active)    Learning Progress Summary    Learner Readiness Method Response Comment Documented by Status   Patient Acceptance E,D NR D/C  planning, home safety, fall precautions, POC progression MB 06/06/17 1349 Active   Family Acceptance E,D NR D/C planning, home safety, fall precautions, POC progression MB 06/06/17 1349 Active               Point: Precautions (Active)    Learning Progress Summary    Learner Readiness Method Response Comment Documented by Status   Patient Acceptance E,D NR D/C planning, home safety, fall precautions, POC progression MB 06/06/17 1349 Active   Family Acceptance E,D NR D/C planning, home safety, fall precautions, POC progression MB 06/06/17 1349 Active                      User Key     Initials Effective Dates Name Provider Type Discipline    MB 03/14/16 -  Vero Sterling, PT Physical Therapist PT                PT Recommendation and Plan  Anticipated Equipment Needs At Discharge: bedside commode, tub bench, wheelchair (If D/C to home.  N/A if D/C to SNF.)  Anticipated Discharge Disposition: skilled nursing facility  Planned Therapy Interventions: balance training, bed mobility training, gait training, home exercise program, patient/family education, strengthening, transfer training  PT Frequency: daily  Plan of Care Review  Plan Of Care Reviewed With: patient, family  Progress: progress toward functional goals as expected  Outcome Summary/Follow up Plan: PT eval completed.  Pt adm from [facility] and was preparing for D/C to home w/ assist and HHPT.  Pt. presents w/ evolving sx. of significant R LE weakness, dec sensation, balance deficits, gait instability, and requires dep  A x 2 for stand-pivot transfer to chair.  Recommended to nsg that patient be placed in lift room.  Pt. will benefit from skilled IPPT to maximize patient's safety and ind. w/ functional mobility.  Recommend D/C to SNF to further progress safety and ind. w/ functional mobility prior to D/C to home.          IP PT Goals       06/06/17 1350          Bed Mobility PT LTG    Bed Mobility PT LTG, Date Established 06/06/17  -MB      Bed Mobility PT LTG,  Time to Achieve 1 wk  -MB      Bed Mobility PT LTG, Activity Type supine to sit/sit to supine;roll left/roll right  -MB      Bed Mobility PT LTG, [place] Level moderate assist (50% patient effort);2 person assist required  -MB      Bed Mobility PT LTG, Outcome goal ongoing  -MB      Transfer Training PT LTG    Transfer Training PT LTG, Date Established 06/06/17  -MB      Transfer Training PT LTG, Time to Achieve 1 wk  -MB      Transfer Training PT LTG, Activity Type bed to chair /chair to bed;sit to stand/stand to sit  -MB      Transfer Training PT LTG, [place] Level maximum assist (25% patient effort);2 person assist required  -MB      Transfer Training PT LTG, Assist Device walker, rolling   ARJO  -MB      Transfer Training PT LTG, Outcome goal ongoing  -MB      Gait Training PT LTG    Gait Training Goal PT LTG, Date Established 06/06/17  -MB      Gait Training Goal PT LTG, Time to Achieve 1 wk  -MB      Gait Training Goal PT LTG, [place] Level maximum assist (25% patient effort);2 person assist required  -MB      Gait Training Goal PT LTG, Assist Device --   ARJO  -MB      Gait Training Goal PT LTG, Distance to Achieve 5  -MB      Gait Training Goal PT LTG, Outcome goal ongoing  -MB      Static Standing Balance PT LTG    Static Standing Balance PT LTG, Date Established 06/06/17  -MB      Static Standing Balance PT LTG, Time to Achieve 1 wk  -MB      Static Standing Balance PT LTG, [place] Level maximum assist (25% patient effort);2 person assist required  -MB      Static Standing Balance PT LTG, Assist Device assistive Device   ARJO  -MB      Static Standing Balance PT LTG, Outcome goal ongoing  -MB        User Key  (r) = Recorded By, (t) = Taken By, (c) = Cosigned By    Initials Name Provider Type    BRYANNA Sterling, PT Physical Therapist                Outcome Measures       06/06/17 1110          How much help from another person do you currently need...    Turning from your back to  your side while in flat bed without using bedrails? 2  -MB      Moving from lying on back to sitting on the side of a flat bed without bedrails? 2  -MB      Moving to and from a bed to a chair (including a wheelchair)? 1  -MB      Standing up from a chair using your arms (e.g., wheelchair, bedside chair)? 1  -MB      Climbing 3-5 steps with a railing? 1  -MB      To walk in hospital room? 1  -MB      AM-PAC 6 Clicks Score 8  -MB      Functional Assessment    Outcome Measure Options AM-PAC 6 Clicks Basic Mobility (PT)  -MB        User Key  (r) = Recorded By, (t) = Taken By, (c) = Cosigned By    Initials Name Provider Type    BRYANNA Sterling PT Physical Therapist           Time Calculation:         PT Charges       06/06/17 1404          Time Calculation    Start Time 1110  -MB      PT Received On 06/06/17  -MB      PT Goal Re-Cert Due Date 06/16/17  -MB      Time Calculation- PT    Total Timed Code Minutes- PT 20 minute(s)  -MB        User Key  (r) = Recorded By, (t) = Taken By, (c) = Cosigned By    Initials Name Provider Type    BRYANNA Sterling PT Physical Therapist          Therapy Charges for Today     Code Description Service Date Service Provider Modifiers Qty    63339833519 HC PT MOBILITY CURRENT 6/6/2017 Vero Sterling PT GP, CM 1    46435992544 HC PT MOBILITY PROJECTED 6/6/2017 Vero Sterling PT GP, CL 1    28814212524 HC PT EVAL MOD COMPLEXITY 4 6/6/2017 Vero Sterling PT GP 1    77563404953 HC PT THER PROC EA 15 MIN 6/6/2017 Vero Sterling, PT GP 1          PT G-Codes  PT Professional Judgement Used?: Yes  Outcome Measure Options: AM-PAC 6 Clicks Basic Mobility (PT)  Score: 8  Functional Limitation: Mobility: Walking and moving around  Mobility: Walking and Moving Around Current Status (): At least 80 percent but less than 100 percent impaired, limited or restricted  Mobility: Walking and Moving Around Goal Status (): At least 60 percent but less than 80 percent  impaired, limited or restricted      Vero Sterling, PT  6/6/2017          Electronically signed by Vero Sterling, PT at 6/6/2017  2:05 PM      Nelly Fink, PTA at 6/7/2017 11:12 AM  Version 1 of 1         Problem: Patient Care Overview (Adult)  Goal: Plan of Care Review  Outcome: Ongoing (interventions implemented as appropriate)    06/07/17 1109   Coping/Psychosocial Response Interventions   Plan Of Care Reviewed With patient   Patient Care Overview   Progress no change   Outcome Evaluation   Outcome Summary/Follow up Plan patient with R LE weakness, unable to attempt standing due to safety issues. Used lift to chair with nursing assist. May need to lift to whellchair and have patient work on W/C mobility.         06/07/17 1109   Coping/Psychosocial Response Interventions   Plan Of Care Reviewed With patient   Patient Care Overview   Progress no change   Outcome Evaluation   Outcome Summary/Follow up Plan patient with R LE weakness, unable to attempt standing due to safety issues. Used lift to chair with nursing assist. May need to lift to wheelchair and have patient work on W/C mobility.         Problem: Inpatient Physical Therapy  Goal: Bed Mobility Goal LTG- PT  Outcome: Ongoing (interventions implemented as appropriate)    06/06/17 1350 06/07/17 1109   Bed Mobility PT LTG   Bed Mobility PT LTG, Date Established 06/06/17 --    Bed Mobility PT LTG, Time to Achieve 1 wk --    Bed Mobility PT LTG, Activity Type supine to sit/sit to supine;roll left/roll right --    Bed Mobility PT LTG, [place] Level moderate assist (50% patient effort);2 person assist required --    Bed Mobility PT LTG, Date Goal Reviewed --  06/07/17   Bed Mobility PT LTG, Outcome --  goal ongoing       Goal: Transfer Training Goal 1 LTG- PT  Outcome: Ongoing (interventions implemented as appropriate)    06/06/17 1350 06/07/17 1109   Transfer Training PT LTG   Transfer Training PT LTG, Date Established 06/06/17 --    Transfer  Training PT LTG, Time to Achieve 1 wk --    Transfer Training PT LTG, Activity Type bed to chair /chair to bed;sit to stand/stand to sit --    Transfer Training PT LTG, [place] Level maximum assist (25% patient effort);2 person assist required --    Transfer Training PT LTG, Assist Device walker, rolling  (ARJO) --    Transfer Training PT LTG, Date Goal Reviewed --  17   Transfer Training PT LTG, Outcome --  goal ongoing       Goal: Gait Training Goal LTG- PT  Outcome: Ongoing (interventions implemented as appropriate)    17 1350 17 1109   Gait Training PT LTG   Gait Training Goal PT LTG, Date Established 17 --    Gait Training Goal PT LTG, Time to Achieve 1 wk --    Gait Training Goal PT LTG, [place] Level maximum assist (25% patient effort);2 person assist required --    Gait Training Goal PT LTG, Assist Device (ARJO) --    Gait Training Goal PT LTG, Distance to Achieve 5 --    Gait Training Goal PT LTG, Date Goal Reviewed --  17   Gait Training Goal PT LTG, Outcome --  goal ongoing       Goal: Static Standing Balance Goal LTG- PT  Outcome: Ongoing (interventions implemented as appropriate)    17 1350 17 1109   Static Standing Balance PT LTG   Static Standing Balance PT LTG, Date Established 17 --    Static Standing Balance PT LTG, Time to Achieve 1 wk --    Static Standing Balance PT LTG, [place] Level maximum assist (25% patient effort);2 person assist required --    Static Standing Balance PT LTG, Assist Device assistive Device  (ARJO) --    Static Standing Balance PT LTG, Date Goal Reviewed --  17   Static Standing Balance PT LTG, Outcome --  goal ongoing              Electronically signed by Nelly Fink PTA at 2017 11:12 AM      Nelly Fink PTA at 2017 11:13 AM  Version 1 of 1         Acute Care - Physical Therapy Treatment Note  [facility] Ter[facility]     Patient Name: Katherin Ta  : 1934  MRN: [id number]  Today's  Date: 6/7/2017  Onset of Illness/Injury or Date of Surgery Date: 06/05/17  Date of Referral to PT: 06/05/17  Referring Physician: KATHY Robles MD    Admit Date: 6/5/2017    Visit Dx:    ICD-10-CM ICD-9-CM   1. Somnolence R40.0 780.09   2. Sepsis, due to unspecified organism A41.9 038.9     995.91   3. Impaired functional mobility, balance, gait, and endurance Z74.09 V49.89     Patient Active Problem List   Diagnosis   • Rheumatoid arthritis   • Spinal stenosis   • Hypertension   • Hypothyroid   • Hyperlipidemia   • GERD (gastroesophageal reflux disease)   • Intractable back pain   • Weakness   • Somnolence               Adult Rehabilitation Note       06/07/17 1045          Rehab Assessment/Intervention    Discipline physical therapy assistant  -AS      Document Type therapy note (daily note)  -AS      Subjective Information agree to therapy;complains of;weakness  -AS      Patient Effort, Rehab Treatment good  -AS      Precautions/Limitations fall precautions  -AS      Recorded by [AS] Nelly Fink PTA      Pain Assessment    Pain Assessment No/denies pain  -AS      Recorded by [AS] Nelly Fink PTA      Cognitive Assessment/Intervention    Current Cognitive/Communication Assessment functional  -AS      Orientation Status oriented to;person;place  -AS      Follows Commands/Answers Questions 100% of the time;able to follow single-step instructions  -AS      Personal Safety WNL/WFL  -AS      Personal Safety Interventions fall prevention program maintained;gait belt;nonskid shoes/slippers when out of bed  -AS      Recorded by [AS] Nelly Fink PTA      Bed Mobility, Assessment/Treatment    Bed Mobility, Assistive Device draw sheet  -AS      Bed Mobility, Roll Left, [place] verbal cues required;maximum assist (25% patient effort);2 person assist required  -AS      Bed Mobility, Roll Right, [place] verbal cues required;maximum assist (25% patient effort);2 person assist required;other (see  comments)   to position lift sling  -AS      Bed Mobility, Comment patient rolled to place lift sling  -AS      Recorded by [AS] Nelly Fink PTA      Transfer Assessment/Treatment    Transfers, Bed-Chair [place] dependent (less than 25% patient effort);2 person assist required  -AS      Transfers, Bed-Chair-Bed, Assist Device mechanical lift  -AS      Recorded by [AS] Nelly Fink PTA      Gait Assessment/Treatment    Gait, [place] Level not appropriate to assess  -AS      Recorded by [AS] Nelly Fink PTA      Therapy Exercises    Right Lower Extremity PROM:;10 reps;supine;ankle pumps/circles;calf stretch;hip abduction/adduction;heel slides  -AS      Left Lower Extremity AAROM:;10 reps;supine;ankle pumps/circles;calf stretch;hip abduction/adduction;heel slides  -AS      Recorded by [AS] Nelly Fink PTA      Positioning and Restraints    Pre-Treatment Position in bed  -AS      Post Treatment Position chair  -AS      In Chair reclined;call light within reach;encouraged to call for assist;with family/caregiver;on mechanical lift sling  -AS      Recorded by [AS] Nelly Fink PTA        User Key  (r) = Recorded By, (t) = Taken By, (c) = Cosigned By    Initials Name Effective Dates    AS Nelly Fink PTA 06/22/15 -                 IP PT Goals       06/07/17 1109 06/06/17 1350       Bed Mobility PT LTG    Bed Mobility PT LTG, Date Established  06/06/17  -MB     Bed Mobility PT LTG, Time to Achieve  1 wk  -MB     Bed Mobility PT LTG, Activity Type  supine to sit/sit to supine;roll left/roll right  -MB     Bed Mobility PT LTG, [place] Level  moderate assist (50% patient effort);2 person assist required  -MB     Bed Mobility PT LTG, Date Goal Reviewed 06/07/17  -AS      Bed Mobility PT LTG, Outcome goal ongoing  -AS goal ongoing  -MB     Transfer Training PT LTG    Transfer Training PT LTG, Date Established  06/06/17  -MB     Transfer Training PT LTG, Time to Achieve   1 wk  -MB     Transfer Training PT LTG, Activity Type  bed to chair /chair to bed;sit to stand/stand to sit  -MB     Transfer Training PT LTG, [place] Level  maximum assist (25% patient effort);2 person assist required  -MB     Transfer Training PT LTG, Assist Device  walker, rolling   ARJO  -MB     Transfer Training PT  LTG, Date Goal Reviewed 06/07/17  -AS      Transfer Training PT LTG, Outcome goal ongoing  -AS goal ongoing  -MB     Gait Training PT LTG    Gait Training Goal PT LTG, Date Established  06/06/17  -MB     Gait Training Goal PT LTG, Time to Achieve  1 wk  -MB     Gait Training Goal PT LTG, [place] Level  maximum assist (25% patient effort);2 person assist required  -MB     Gait Training Goal PT LTG, Assist Device  --   ARJO  -MB     Gait Training Goal PT LTG, Distance to Achieve  5  -MB     Gait Training Goal PT LTG, Date Goal Reviewed 06/07/17  -AS      Gait Training Goal PT LTG, Outcome goal ongoing  -AS goal ongoing  -MB     Static Standing Balance PT LTG    Static Standing Balance PT LTG, Date Established  06/06/17  -MB     Static Standing Balance PT LTG, Time to Achieve  1 wk  -MB     Static Standing Balance PT LTG, [place] Level  maximum assist (25% patient effort);2 person assist required  -MB     Static Standing Balance PT LTG, Assist Device  assistive Device   ARJO  -MB     Static Standing Balance PT LTG, Date Goal Reviewed 06/07/17  -AS      Static Standing Balance PT LTG, Outcome goal ongoing  -AS goal ongoing  -MB       User Key  (r) = Recorded By, (t) = Taken By, (c) = Cosigned By    Initials Name Provider Type    AS Nelly Fink, PTA Physical Therapy Assistant    BRYANNA Sterling, PT Physical Therapist          Physical Therapy Education     Title: PT OT SLP Therapies (Active)     Topic: Physical Therapy (Active)     Point: Mobility training (Active)    Learning Progress Summary    Learner Readiness Method Response Comment Documented by Status   Patient  Acceptance E NR  AS 06/07/17 1109 Active    Acceptance E,TB TOMMYKARLA   06/07/17 0558 Done    Acceptance E,D NR D/C planning, home safety, fall precautions, POC progression MB 06/06/17 1349 Active   Family Acceptance E NR  AS 06/07/17 1109 Active    Acceptance E,D NR D/C planning, home safety, fall precautions, POC progression MB 06/06/17 1349 Active               Point: Home exercise program (Active)    Learning Progress Summary    Learner Readiness Method Response Comment Documented by Status   Patient Acceptance E NR  AS 06/07/17 1109 Active    Acceptance E,TB TOMMYKARLA   06/07/17 0558 Done    Acceptance E,D NR D/C planning, home safety, fall precautions, POC progression MB 06/06/17 1349 Active   Family Acceptance E NR  AS 06/07/17 1109 Active    Acceptance E,D NR D/C planning, home safety, fall precautions, POC progression MB 06/06/17 1349 Active               Point: Body mechanics (Active)    Learning Progress Summary    Learner Readiness Method Response Comment Documented by Status   Patient Acceptance E NR  AS 06/07/17 1109 Active    Acceptance E,TB TOMMYKARLA   06/07/17 0558 Done    Acceptance E,D NR D/C planning, home safety, fall precautions, POC progression MB 06/06/17 1349 Active   Family Acceptance E NR  AS 06/07/17 1109 Active    Acceptance E,D NR D/C planning, home safety, fall precautions, POC progression MB 06/06/17 1349 Active               Point: Precautions (Active)    Learning Progress Summary    Learner Readiness Method Response Comment Documented by Status   Patient Acceptance E NR  AS 06/07/17 1109 Active    Acceptance E,TB TOMMYKARLA   06/07/17 0558 Done    Acceptance E,D NR D/C planning, home safety, fall precautions, POC progression MB 06/06/17 1349 Active   Family Acceptance E NR  AS 06/07/17 1109 Active    Acceptance E,D NR D/C planning, home safety, fall precautions, POC progression MB 06/06/17 1349 Active                      User Key     Initials Effective Dates Name Provider Type Discipline     AS 06/22/15 -  Nelly Fink PTA Physical Therapy Assistant PT    MB 03/14/16 -  Vero Sterling, PT Physical Therapist PT     08/22/16 -  Carole Prado, RN Registered Nurse Nurse                    PT Recommendation and Plan  Anticipated Equipment Needs At Discharge: bedside commode, tub bench, wheelchair (If D/C to home.  N/A if D/C to SNF.)  Anticipated Discharge Disposition: skilled nursing facility  Planned Therapy Interventions: balance training, bed mobility training, gait training, home exercise program, patient/family education, strengthening, transfer training  PT Frequency: daily  Plan of Care Review  Plan Of Care Reviewed With: patient  Progress: no change  Outcome Summary/Follow up Plan: patient with R LE weakness, unable to attempt standing due to safety issues. Used lift to chair with nursing assist. May need to lift to wheelchair and have patient work on W/C mobility.          Outcome Measures       06/07/17 1045 06/06/17 1110       How much help from another person do you currently need...    Turning from your back to your side while in flat bed without using bedrails? 2  -AS 2  -MB     Moving from lying on back to sitting on the side of a flat bed without bedrails? 2  -AS 2  -MB     Moving to and from a bed to a chair (including a wheelchair)? 1  -AS 1  -MB     Standing up from a chair using your arms (e.g., wheelchair, bedside chair)? 1  -AS 1  -MB     Climbing 3-5 steps with a railing? 1  -AS 1  -MB     To walk in hospital room? 1  -AS 1  -MB     AM-PAC 6 Clicks Score 8  -AS 8  -MB     Functional Assessment    Outcome Measure Options AM-PAC 6 Clicks Basic Mobility (PT)  -AS AM-PAC 6 Clicks Basic Mobility (PT)  -MB       User Key  (r) = Recorded By, (t) = Taken By, (c) = Cosigned By    Initials Name Provider Type    AS Nelly Fink PTA Physical Therapy Assistant    MB Vero Sterling, PT Physical Therapist           Time Calculation:         PT Charges       06/07/17 5333           Time Calculation    Start Time 1045  -AS      PT Received On 06/07/17  -AS      PT Goal Re-Cert Due Date 06/16/17  -AS      Time Calculation- PT    Total Timed Code Minutes- PT 23 minute(s)  -AS        User Key  (r) = Recorded By, (t) = Taken By, (c) = Cosigned By    Initials Name Provider Type    AS Nelly Fink PTA Physical Therapy Assistant          Therapy Charges for Today     Code Description Service Date Service Provider Modifiers Qty    [id number] HC PT THER PROC EA 15 MIN 6/7/2017 Nelly Fink PTA GP 2          PT G-Codes  PT Professional Judgement Used?: Yes  Outcome Measure Options: AM-PAC 6 Clicks Basic Mobility (PT)  Score: 8  Functional Limitation: Mobility: Walking and moving around  Mobility: Walking and Moving Around Current Status (): At least 80 percent but less than 100 percent impaired, limited or restricted  Mobility: Walking and Moving Around Goal Status (): At least 60 percent but less than 80 percent impaired, limited or restricted    Nelly Fink PTA  6/7/2017          Electronically signed by Nelly Fink PTA at 6/7/2017 11:14 AM        Occupational Therapy Notes (last 72 hours) (Notes from 6/4/2017  2:52 PM through 6/7/2017  2:52 PM)     No notes of this type exist for this encounter.

## 2017-06-07 NOTE — PLAN OF CARE
Problem: Patient Care Overview (Adult)  Goal: Plan of Care Review  Outcome: Ongoing (interventions implemented as appropriate)    06/07/17 1109   Coping/Psychosocial Response Interventions   Plan Of Care Reviewed With patient   Patient Care Overview   Progress no change   Outcome Evaluation   Outcome Summary/Follow up Plan patient with R LE weakness, unable to attempt standing due to safety issues. Used lift to chair with nursing assist. May need to lift to whellchair and have patient work on W/C mobility.         06/07/17 1109   Coping/Psychosocial Response Interventions   Plan Of Care Reviewed With patient   Patient Care Overview   Progress no change   Outcome Evaluation   Outcome Summary/Follow up Plan patient with R LE weakness, unable to attempt standing due to safety issues. Used lift to chair with nursing assist. May need to lift to wheelchair and have patient work on W/C mobility.         Problem: Inpatient Physical Therapy  Goal: Bed Mobility Goal LTG- PT  Outcome: Ongoing (interventions implemented as appropriate)    06/06/17 1350 06/07/17 1109   Bed Mobility PT LTG   Bed Mobility PT LTG, Date Established 06/06/17 --    Bed Mobility PT LTG, Time to Achieve 1 wk --    Bed Mobility PT LTG, Activity Type supine to sit/sit to supine;roll left/roll right --    Bed Mobility PT LTG, [place] Level moderate assist (50% patient effort);2 person assist required --    Bed Mobility PT LTG, Date Goal Reviewed --  06/07/17   Bed Mobility PT LTG, Outcome --  goal ongoing       Goal: Transfer Training Goal 1 LTG- PT  Outcome: Ongoing (interventions implemented as appropriate)    06/06/17 1350 06/07/17 1109   Transfer Training PT LTG   Transfer Training PT LTG, Date Established 06/06/17 --    Transfer Training PT LTG, Time to Achieve 1 wk --    Transfer Training PT LTG, Activity Type bed to chair /chair to bed;sit to stand/stand to sit --    Transfer Training PT LTG, [place] Level maximum assist (25% patient  effort);2 person assist required --    Transfer Training PT LTG, Assist Device walker, rolling  (ARJO) --    Transfer Training PT LTG, Date Goal Reviewed --  06/07/17   Transfer Training PT LTG, Outcome --  goal ongoing       Goal: Gait Training Goal LTG- PT  Outcome: Ongoing (interventions implemented as appropriate)    06/06/17 1350 06/07/17 1109   Gait Training PT LTG   Gait Training Goal PT LTG, Date Established 06/06/17 --    Gait Training Goal PT LTG, Time to Achieve 1 wk --    Gait Training Goal PT LTG, [place] Level maximum assist (25% patient effort);2 person assist required --    Gait Training Goal PT LTG, Assist Device (ARJO) --    Gait Training Goal PT LTG, Distance to Achieve 5 --    Gait Training Goal PT LTG, Date Goal Reviewed --  06/07/17   Gait Training Goal PT LTG, Outcome --  goal ongoing       Goal: Static Standing Balance Goal LTG- PT  Outcome: Ongoing (interventions implemented as appropriate)    06/06/17 1350 06/07/17 1109   Static Standing Balance PT LTG   Static Standing Balance PT LTG, Date Established 06/06/17 --    Static Standing Balance PT LTG, Time to Achieve 1 wk --    Static Standing Balance PT LTG, [place] Level maximum assist (25% patient effort);2 person assist required --    Static Standing Balance PT LTG, Assist Device assistive Device  (ARJO) --    Static Standing Balance PT LTG, Date Goal Reviewed --  06/07/17   Static Standing Balance PT LTG, Outcome --  goal ongoing

## 2017-06-07 NOTE — CONSULTS
Katherin Ta  1934  [id number]    Date of Consult: 6/7/2017    Date of Admission: 6/5/2017      Requesting Provider: Dr. Chin   Evaluating Physician: Kuldip Calvert MD    CC:   Chief Complaint   Patient presents with   • Altered Mental Status       Reason for Consultation: Sepsis and concern for Colitis    History of present illness:    Patient is a 82 y.o.  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 She has had two spinal surgical interventions in the past and a chronic indwelling morphine pump for pain control.   Patient was at Rehab at [facility] and had been cooperating with therapy until day of her admission. She had some R arm weakness, lethargy and confusion with diminished strength and mobility of the R arm and was brought to ED for evaluation. Her only physical complaint was that she had been suffering with 4-5 days of constipation with fever on the day of admission.   MRI imaging without focal CVA and CT A/P performed with ascending colitis. ID asked to see for treatment recommendations.     Past Medical History:   Diagnosis Date   • GERD (gastroesophageal reflux disease) 5/11/2017   • Gout    • Hyperlipidemia 5/11/2017   • Hypertension 5/11/2017   • Hypothyroid 5/11/2017   • Rheumatoid arthritis    • Spinal stenosis        Past Surgical History:   Procedure Laterality Date   • AMPUTATION DIGIT Right     right middle toe   • BACK SURGERY     • EYE SURGERY     • HAND SURGERY     • JOINT REPLACEMENT      left knee   • PERICARDIAL WINDOW              Social History Narrative: . Lives in [place]. Retired med tech. No ETOH, Tobacco, Drugs. No history of recurrent ABX, C diff colitis, MRSA. No recent travel. No hep B ,C, HIV, TB       family history includes Arthritis in her mother; Heart attack in her father;  Hypertension in her mother.    No Known Allergies    Medication:  Current Facility-Administered Medications   Medication Dose Route Frequency Provider Last Rate Last Dose   • ! Pt scheduled for a vancomycin trough on 6/7 at 1030 prior to 1100 dose, please hold until notified from pharmacy   Does not apply Once Zabrina Robles MD       • acetaminophen (TYLENOL) tablet 500 mg  500 mg Oral Q6H PRN Francisco Alan MD       • allopurinol (ZYLOPRIM) tablet 200 mg  200 mg Oral Daily Francisco Alan MD   200 mg at 06/07/17 0812   • amLODIPine (NORVASC) tablet 5 mg  5 mg Oral Q24H Francisco Alan MD   5 mg at 06/07/17 0812   • aspirin chewable tablet 81 mg  81 mg Oral Daily Francisco Alan MD   81 mg at 06/07/17 0812   • atorvastatin (LIPITOR) tablet 10 mg  10 mg Oral Nightly Francisco Alan MD   10 mg at 06/06/17 2122   • baclofen (LIORESAL) tablet 10 mg  10 mg Oral Q6H Francisco Alan MD   10 mg at 06/07/17 0647   • benazepril (LOTENSIN) tablet 10 mg  10 mg Oral Daily Francisco Alan MD   10 mg at 06/07/17 0816   • castor oil-balsam peru (VENELEX) ointment   Topical Q12H Zabrina Robles MD       • colchicine tablet 0.6 mg  0.6 mg Oral Daily Francisco Alan MD   0.6 mg at 06/07/17 0812   • docusate sodium (COLACE) capsule 100 mg  100 mg Oral BID Francisco Alan MD   100 mg at 06/07/17 0812   • gabapentin (NEURONTIN) capsule 300 mg  300 mg Oral Q8H Francisco Alan MD   300 mg at 06/07/17 0647   • heparin (porcine) 5000 UNIT/ML injection 5,000 Units  5,000 Units Subcutaneous Q8H Zabrina Robles MD   5,000 Units at 06/07/17 0647   • levothyroxine (SYNTHROID, LEVOTHROID) tablet 75 mcg  75 mcg Oral Q AM Francisco Alan MD   75 mcg at 06/07/17 0647   • lisinopril (PRINIVIL,ZESTRIL) tablet 10 mg  10 mg Oral Daily Francisco Alan MD   10 mg at 06/07/17 0812   • melatonin sublingual tablet 5 mg  5 mg Sublingual Nightly PRN Francisco Alan MD       • Morphine iv pump   Does not apply Continuous Francisco Alan MD      "  • ondansetron (ZOFRAN) tablet 4 mg  4 mg Oral Q6H PRN Zabrina Robles MD        Or   • ondansetron (ZOFRAN) injection 4 mg  4 mg Intravenous Q6H PRN Zabrina Robles MD       • pantoprazole (PROTONIX) EC tablet 40 mg  40 mg Oral Q AM Francisco Alan MD   40 mg at 17 0647   • Pharmacy to dose vancomycin   Does not apply Continuous PRN Zabrina Robles MD       • piperacillin-tazobactam (ZOSYN) 3.375 g/100 mL 0.9% NS IVPB (mbp)  3.375 g Intravenous Q6H Zabrina Robles  mL/hr at 17 0400 3.375 g at 17 0400   • sodium chloride 0.9 % flush 1-10 mL  1-10 mL Intravenous PRN Zabrina Robles MD       • sodium chloride 0.9 % infusion  100 mL/hr Intravenous Continuous Zabrina Robles  mL/hr at 17 0140 100 mL/hr at 17 0140   • vancomycin (VANCOCIN) in iso-osmotic dextrose IVPB 1 g (premix) 200 mL  1,000 mg Intravenous Q24H Zabrina Robles MD   1,000 mg at 17 1131       Antibiotics:  Vanc and Zosyn       Review of Systems  Full 12 point review of systems reviewed and negative except for constipation, weakness, tremor, poor PO intake, chronic back pain, nonambulatory state, fever on day of admission.     Physical Exam:   Vital Signs   /74  Pulse 74  Temp 98.8 °F (37.1 °C) (Oral)   Resp 16  Ht 62\" (157.5 cm)  Wt 160 lb (72.6 kg)  SpO2 95%  BMI 29.26 kg/m2  Temp (24hrs), Av.8 °F (37.1 °C), Min:98.8 °F (37.1 °C), Max:98.8 °F (37.1 °C)      GENERAL: Awake and alert, in no acute distress.  Eating breakfast. NAD. Good historian   HEENT: Normocephalic, atraumatic.  PERRL. EOMI. No conjunctival injection. No icterus. Oropharynx clear without evidence of thrush or exudate. No evidence of peridontal disease.  Dry MM   NECK: Supple without nuchal rigidity  LYMPH: No cervical, axillary or inguinal lymphadenopathy. No neck masses  HEART: RRR; No murmur, rubs, gallops.   LUNGS: Clear to auscultation bilaterally without wheezing, rales, rhonchi. Normal respiratory effort. " Use of Supplemental O2 at 2 L   ABDOMEN: Soft, nontender, nondistended. Positive bowel sounds. No rebound or guarding. Pain pump pocket palpable and nontender  EXT:  No joint effusions. Limited mobility. Difficulty walking  : Normal appearing genitalia with Gilbert catheter and clear urine.   MSK: Essential tremor upper extremities. No joint effusions. Nonambulatory.   SKIN: Warm and dry without cutaneous eruptions.    NEURO: Oriented to PPT. No focal deficits.   PSYCHIATRIC: Normal insight and judgement. Cooperative with PE    Laboratory Data      Results from last 7 days  Lab Units 06/07/17  0520 06/06/17  0507 06/05/17  1012   WBC 10*3/mm3 7.96 12.97* 17.66*   HEMOGLOBIN g/dL 10.8* 11.2* 12.5   HEMATOCRIT % 33.6* 35.2 38.6   PLATELETS 10*3/mm3 147* 155 162       Results from last 7 days  Lab Units 06/07/17  0520   SODIUM mmol/L 143   POTASSIUM mmol/L 3.7   CHLORIDE mmol/L 111*   TOTAL CO2 mmol/L 30.0   BUN mg/dL 12   CREATININE mg/dL 0.60   GLUCOSE mg/dL 93   CALCIUM mg/dL 9.8       Results from last 7 days  Lab Units 06/05/17  1012   ALK PHOS U/L 107*   BILIRUBIN mg/dL 0.7   ALT (SGPT) U/L 31   AST (SGOT) U/L 24       Results from last 7 days  Lab Units 06/05/17  1012   SED RATE mm/hr 26       Results from last 7 days  Lab Units 06/05/17  1012   CRP mg/dL 7.75*       Estimated Creatinine Clearance: 50.6 mL/min (by C-G formula based on Cr of 0.6).      Microbiology:   Blood culture 6/5: NGSF x 2    Radiology:  Imaging Results (last 24 hours)     Procedure Component Value Units Date/Time    XR Chest 1 View [[id number]] Collected:  06/05/17 1129     Updated:  06/06/17 1212    Narrative:       EXAMINATION: XR CHEST 1 VW- 06/05/2017     INDICATION: Weak/Dizzy/AMS triage protocol      COMPARISON: NONE     FINDINGS: Portable chest reveals heart to be enlarged. Lung volumes are  low. Mild increased markings at the left lung base. Calcified granuloma  identified in the right upper lobe. Degenerative changes seen within  the  spine with vascular calcifications present.           Impression:       Heart is enlarged with prominence of the pulmonary  vascularity bilaterally. Lung volumes are low.     D:  06/05/2017  E:  06/05/2017     This report was finalized on 6/6/2017 12:10 PM by Dr. Mirela Haynes MD.       MRI Brain Without Contrast [[id number]] Collected:  06/05/17 1325     Updated:  06/06/17 1215    Narrative:       EXAMINATION: MRI BRAIN WO CONTRAST- 06/05/2017     INDICATION: R40.0-Somnolence; A41.9-Sepsis, unspecified organism; mental  status change.     TECHNIQUE: Routine multiplanar imaging was obtained of the brain without  the administration of gadolinium contrast.     COMPARISON: NONE     FINDINGS: There is atrophy identified of the brain. Signal changes seen  scattered throughout the periventricular and subcortical white matter  suggesting chronic small vessel ischemic change. Visualized paranasal  sinuses are clear. Globes and orbits are intact. The mastoid air cells  are patent. No cerebellopontine angle mass identified.  Pituitary and  sella are unremarkable. Craniovertebral junction is preserved. No  evidence of restricted diffusion to suggest evidence of an acute  ischemic insult. Flow voids are preserved in the major intracranial  vessels.       Impression:       Atrophy of the brain with some chronic small vessel ischemic  changes seen in the periventricular and subcortical white matter. No  acute intracranial abnormality is identified.     D:  06/05/2017  E:  06/05/2017         This report was finalized on 6/6/2017 12:13 PM by Dr. Mirela Haynes MD.       CT Abdomen Pelvis Without Contrast [[id number]] Collected:  06/05/17 1416     Updated:  06/06/17 1215    Narrative:       EXAMINATION: CT ABDOMEN PELVIS WO CONTRAST-, CT CHEST WO CONTRAST-  06/05/2017     INDICATION: fever; R40.0-Somnolence; A41.9-Sepsis, unspecified organism;  focal weakness     TECHNIQUE: Multiple axial CT imaging was obtained of the  chest, abdomen  and pelvis without the administration of oral or intravenous contrast.     The radiation dose reduction device was turned on for each scan per the  ALARA (As Low as Reasonably Achievable) protocol.     COMPARISON: 05/10/2017     FINDINGS:      CHEST: Thyroid is homogeneous in appearance. No mediastinal mass or  adenopathy. The cardiac chambers are within normal limits. No  pericardial effusion. Some atelectatic changes identified in the lung  bases bilaterally. The lung apices are clear. No parenchymal  consolidation, pulmonary mass or nodule present. Degenerative changes  seen within the spine. Calcification of subcarinal region suggesting old  healed granulomatous disease.     ABDOMEN: There is mild dilatation identified of the renal collecting  systems bilaterally. Distention identified of the bladder. There is mild  stranding identified of the kidneys. No renal or ureteral stone  identified. There is some stranding identified in the right flank.  Abnormal wall thickening of the ascending colon. Findings may suggest a  focal diverticulitis or colitis. There is a small diverticula identified  in this area. No definite extraluminal air. There is no abscess  collection identified. Fluid in the right paracolic gutter. No  significant fluid in the pelvis. No gallstones identified. Diverticula  identified throughout the remainder of the colon. Pancreas is  homogeneous.     PELVIS: Mild distention of the bladder. Stool within the colon. Pelvic  organs are otherwise unremarkable. No pelvic adenopathy. No free fluid  or free air. Degenerative changes seen within the spine and pelvis.       Impression:       Atelectatic changes in lung bases bilaterally. Some  stranding identified surrounding abnormal wall thickening of the  ascending colon. Findings suggesting either focal colitis or  diverticulitis. No abscess collection or free air.     D:  06/05/2017  E:  06/05/2017        This report was finalized on  6/6/2017 12:13 PM by Dr. Mirela Haynes MD.       CT Chest Without Contrast [[id number]] Collected:  06/05/17 1416     Updated:  06/06/17 1215    Narrative:       EXAMINATION: CT ABDOMEN PELVIS WO CONTRAST-, CT CHEST WO CONTRAST-  06/05/2017     INDICATION: fever; R40.0-Somnolence; A41.9-Sepsis, unspecified organism;  focal weakness     TECHNIQUE: Multiple axial CT imaging was obtained of the chest, abdomen  and pelvis without the administration of oral or intravenous contrast.     The radiation dose reduction device was turned on for each scan per the  ALARA (As Low as Reasonably Achievable) protocol.     COMPARISON: 05/10/2017     FINDINGS:      CHEST: Thyroid is homogeneous in appearance. No mediastinal mass or  adenopathy. The cardiac chambers are within normal limits. No  pericardial effusion. Some atelectatic changes identified in the lung  bases bilaterally. The lung apices are clear. No parenchymal  consolidation, pulmonary mass or nodule present. Degenerative changes  seen within the spine. Calcification of subcarinal region suggesting old  healed granulomatous disease.     ABDOMEN: There is mild dilatation identified of the renal collecting  systems bilaterally. Distention identified of the bladder. There is mild  stranding identified of the kidneys. No renal or ureteral stone  identified. There is some stranding identified in the right flank.  Abnormal wall thickening of the ascending colon. Findings may suggest a  focal diverticulitis or colitis. There is a small diverticula identified  in this area. No definite extraluminal air. There is no abscess  collection identified. Fluid in the right paracolic gutter. No  significant fluid in the pelvis. No gallstones identified. Diverticula  identified throughout the remainder of the colon. Pancreas is  homogeneous.     PELVIS: Mild distention of the bladder. Stool within the colon. Pelvic  organs are otherwise unremarkable. No pelvic adenopathy. No free  fluid  or free air. Degenerative changes seen within the spine and pelvis.       Impression:       Atelectatic changes in lung bases bilaterally. Some  stranding identified surrounding abnormal wall thickening of the  ascending colon. Findings suggesting either focal colitis or  diverticulitis. No abscess collection or free air.     D:  06/05/2017  E:  06/05/2017        This report was finalized on 6/6/2017 12:13 PM by Dr. Mirela Haynes MD.           HISTORICAL IMAGING:  MRI Lumbar spine    IMPRESSION:  There is extreme multilevel degenerative disc and  arthropathic disease with marked and extreme multifocal levels of  critical central and lateral recess impingement stenosis. There is  levoscoliosis. There is collapse of all disc spaces. Critical  impingements are noted literally at all levels from T10 distally to S1  as described in detail above with both central and lateral recess  stenosis. Lateral recess high-grade stenotic impingement and  obliteration of the lateral recesses is the most dominant finding at  multiple levels as described above.    PROBLEM LIST:   Ascending Colitis  Leukocytosis  Chronic constipation  Descending colon diverticula  R arm and facial drooping- MRI negative for CVA   Symptoms resolved  Severe spinal stenosis with degenerative disc and collapse  Chronic back pain with indwelling morphine pain pump   HTN, HLP, hypothyroidism     ASSESSMENT:  Patient is an 82-year-old female with a history of chronic back pain with severe spinal stenosis and degenerative disc disease with vertebral collapse noted on recent MRI of the lumbar spine.  She is a nonsurgical candidate with a chronic indwelling pain pump.  She is admitted from [facility] secondary to lethargy, confusion, right arm weakness and right-sided facial droop.  MRI performed at admission negative for acute CVA.  She was found to have leukocytosis and imaging of the abdomen, pelvis, chest showed  descending colitis with coli on it diverticula noted.  No free fluid or evidence of abscess or perforation.  She is on vancomycin and Zosyn at this time with infectious disease consultation for treatment recommendations.  She does have a history of chronic constipation.  The cultures collected and negative.  She is responded well to suppository use. At this time, would de-escalate antibiotics to Augmentin to cover for mild colitis as white blood cell count is now normal with no other clear source of infection other than a sending colitis on CT scan     PLAN:  Discontinue Vancomycin  Stop Zosyn   Augmentin 875 PO BID x 7 days in chart  CRP in the morning  OK to d/c per ID when all others agree    This patient was seen for an Infectious Disease consultation at the request of Dr. Chin for colitis and my recommendations have been communicated to the requesting physician.    UM:  DC planning on oral antibiotics     Dr. Kuldip Calvert saw the patient, performed the physical exam, reviewed the laboratory data and guided with the formulation of the above problem list, assessment and treatment plan.     Kuldip Calevrt MD  6/7/2017

## 2017-06-07 NOTE — PROGRESS NOTES
"      HOSPITALIST DAILY PROGRESS NOTE    Chief Complaint: Lethargy/somnolence    Subjective   SUBJECTIVE/OVERNIGHT EVENTS   No acute events overnight, patient states that she hada good night, this morning she is alert and awake, denies any fevers or chills    Review of Systems:  Gen-no fevers, no chills  CV-no chest pain, no palpitations  Resp-no cough, no dyspnea  GI-no N/V/D, no abd pain    Objective   OBJECTIVE   I have reviewed the vital signs.  /69 (BP Location: Right arm, Patient Position: Lying)  Pulse 87  Temp 98.7 °F (37.1 °C) (Oral)   Resp 16  Ht 62\" (157.5 cm)  Wt 160 lb (72.6 kg)  SpO2 93%  BMI 29.26 kg/m2    Physical Exam:  Gen-no acute distress, comfortable  CV-RRR, S1 S2 normal, no m/r/g  Resp-CTAB, no wheezes  Abd-soft, NT, ND, +BS  Ext-no edema  Neuro-A&Ox3, no focal deficits  Psych-appropriate mood and affect    Results:  I have reviewed the labs, culture data, radiology results, and diagnostic studies.      Results from last 7 days  Lab Units 06/07/17  0520 06/06/17  0507 06/05/17  1012   WBC 10*3/mm3 7.96 12.97* 17.66*   HEMOGLOBIN g/dL 10.8* 11.2* 12.5   HEMATOCRIT % 33.6* 35.2 38.6   PLATELETS 10*3/mm3 147* 155 162       Results from last 7 days  Lab Units 06/07/17  0520   SODIUM mmol/L 143   POTASSIUM mmol/L 3.7   CHLORIDE mmol/L 111*   TOTAL CO2 mmol/L 30.0   BUN mg/dL 12   CREATININE mg/dL 0.60   GLUCOSE mg/dL 93   CALCIUM mg/dL 9.8       Culture Data:  Cultures:    Blood Culture   Date Value Ref Range Status   06/05/2017 No growth at 24 hours  Preliminary   06/05/2017 No growth at 24 hours  Preliminary         Radiology Results:  Imaging Results (last 24 hours)     Procedure Component Value Units Date/Time    XR Chest 1 View [[id number]] Collected:  06/05/17 1129     Updated:  06/06/17 1212    Narrative:       EXAMINATION: XR CHEST 1 VW- 06/05/2017     INDICATION: Weak/Dizzy/AMS triage protocol      COMPARISON: NONE     FINDINGS: Portable chest reveals heart to be enlarged. Lung " volumes are  low. Mild increased markings at the left lung base. Calcified granuloma  identified in the right upper lobe. Degenerative changes seen within the  spine with vascular calcifications present.           Impression:       Heart is enlarged with prominence of the pulmonary  vascularity bilaterally. Lung volumes are low.     D:  06/05/2017  E:  06/05/2017     This report was finalized on 6/6/2017 12:10 PM by Dr. Mirela Haynes MD.       MRI Brain Without Contrast [[id number]] Collected:  06/05/17 1325     Updated:  06/06/17 1215    Narrative:       EXAMINATION: MRI BRAIN WO CONTRAST- 06/05/2017     INDICATION: R40.0-Somnolence; A41.9-Sepsis, unspecified organism; mental  status change.     TECHNIQUE: Routine multiplanar imaging was obtained of the brain without  the administration of gadolinium contrast.     COMPARISON: NONE     FINDINGS: There is atrophy identified of the brain. Signal changes seen  scattered throughout the periventricular and subcortical white matter  suggesting chronic small vessel ischemic change. Visualized paranasal  sinuses are clear. Globes and orbits are intact. The mastoid air cells  are patent. No cerebellopontine angle mass identified.  Pituitary and  sella are unremarkable. Craniovertebral junction is preserved. No  evidence of restricted diffusion to suggest evidence of an acute  ischemic insult. Flow voids are preserved in the major intracranial  vessels.       Impression:       Atrophy of the brain with some chronic small vessel ischemic  changes seen in the periventricular and subcortical white matter. No  acute intracranial abnormality is identified.     D:  06/05/2017  E:  06/05/2017         This report was finalized on 6/6/2017 12:13 PM by Dr. Mirela Haynes MD.       CT Abdomen Pelvis Without Contrast [[id number]] Collected:  06/05/17 1416     Updated:  06/06/17 1215    Narrative:       EXAMINATION: CT ABDOMEN PELVIS WO CONTRAST-, CT CHEST WO  CONTRAST-  06/05/2017     INDICATION: fever; R40.0-Somnolence; A41.9-Sepsis, unspecified organism;  focal weakness     TECHNIQUE: Multiple axial CT imaging was obtained of the chest, abdomen  and pelvis without the administration of oral or intravenous contrast.     The radiation dose reduction device was turned on for each scan per the  ALARA (As Low as Reasonably Achievable) protocol.     COMPARISON: 05/10/2017     FINDINGS:      CHEST: Thyroid is homogeneous in appearance. No mediastinal mass or  adenopathy. The cardiac chambers are within normal limits. No  pericardial effusion. Some atelectatic changes identified in the lung  bases bilaterally. The lung apices are clear. No parenchymal  consolidation, pulmonary mass or nodule present. Degenerative changes  seen within the spine. Calcification of subcarinal region suggesting old  healed granulomatous disease.     ABDOMEN: There is mild dilatation identified of the renal collecting  systems bilaterally. Distention identified of the bladder. There is mild  stranding identified of the kidneys. No renal or ureteral stone  identified. There is some stranding identified in the right flank.  Abnormal wall thickening of the ascending colon. Findings may suggest a  focal diverticulitis or colitis. There is a small diverticula identified  in this area. No definite extraluminal air. There is no abscess  collection identified. Fluid in the right paracolic gutter. No  significant fluid in the pelvis. No gallstones identified. Diverticula  identified throughout the remainder of the colon. Pancreas is  homogeneous.     PELVIS: Mild distention of the bladder. Stool within the colon. Pelvic  organs are otherwise unremarkable. No pelvic adenopathy. No free fluid  or free air. Degenerative changes seen within the spine and pelvis.       Impression:       Atelectatic changes in lung bases bilaterally. Some  stranding identified surrounding abnormal wall thickening of the  ascending  colon. Findings suggesting either focal colitis or  diverticulitis. No abscess collection or free air.     D:  06/05/2017  E:  06/05/2017        This report was finalized on 6/6/2017 12:13 PM by Dr. Mirela Haynes MD.       CT Chest Without Contrast [[id number]] Collected:  06/05/17 1416     Updated:  06/06/17 1215    Narrative:       EXAMINATION: CT ABDOMEN PELVIS WO CONTRAST-, CT CHEST WO CONTRAST-  06/05/2017     INDICATION: fever; R40.0-Somnolence; A41.9-Sepsis, unspecified organism;  focal weakness     TECHNIQUE: Multiple axial CT imaging was obtained of the chest, abdomen  and pelvis without the administration of oral or intravenous contrast.     The radiation dose reduction device was turned on for each scan per the  ALARA (As Low as Reasonably Achievable) protocol.     COMPARISON: 05/10/2017     FINDINGS:      CHEST: Thyroid is homogeneous in appearance. No mediastinal mass or  adenopathy. The cardiac chambers are within normal limits. No  pericardial effusion. Some atelectatic changes identified in the lung  bases bilaterally. The lung apices are clear. No parenchymal  consolidation, pulmonary mass or nodule present. Degenerative changes  seen within the spine. Calcification of subcarinal region suggesting old  healed granulomatous disease.     ABDOMEN: There is mild dilatation identified of the renal collecting  systems bilaterally. Distention identified of the bladder. There is mild  stranding identified of the kidneys. No renal or ureteral stone  identified. There is some stranding identified in the right flank.  Abnormal wall thickening of the ascending colon. Findings may suggest a  focal diverticulitis or colitis. There is a small diverticula identified  in this area. No definite extraluminal air. There is no abscess  collection identified. Fluid in the right paracolic gutter. No  significant fluid in the pelvis. No gallstones identified. Diverticula  identified throughout the remainder of the  colon. Pancreas is  homogeneous.     PELVIS: Mild distention of the bladder. Stool within the colon. Pelvic  organs are otherwise unremarkable. No pelvic adenopathy. No free fluid  or free air. Degenerative changes seen within the spine and pelvis.       Impression:       Atelectatic changes in lung bases bilaterally. Some  stranding identified surrounding abnormal wall thickening of the  ascending colon. Findings suggesting either focal colitis or  diverticulitis. No abscess collection or free air.     D:  06/05/2017  E:  06/05/2017        This report was finalized on 6/6/2017 12:13 PM by Dr. Mirela Haynes MD.             I have reviewed the medications.    Assessment/Plan   ASSESSMENT/PLAN    Principal Problem:    Somnolence  Active Problems:    Hypertension    Hypothyroid    Hyperlipidemia    GERD (gastroesophageal reflux disease)    Weakness    82 yof, currently at [facility] after being admitted and d/c from [facility] for non-operable intractable back pain (5/10-5/16) she developed right-sided facial droop, right arm weakness, but presented for difficulty to arouse all that have since resolved, etiology of her symptoms are unknown, but suspect drug induced. However, on presentation here patient had fever and tachycardia with some leukocytosis admited with sepsis source unknown? Colitis ?diverticulitis per imaging     Plan  - MRI with no intracranial abnormalities  - UA wnl, blood cultures sent NGTD  - started on abx, will continue, de-escalate quickly, consult ID  - CT abd suggestive of either focal colitis or diverticulitis.  - leukocytosis improving  - repeat labs in AM     Dispo: will anticipate d/c to home with HH/PT in 1-2 days    Ellen Chin MD  06/07/17  9:15 AM

## 2017-06-07 NOTE — PLAN OF CARE
Problem: Patient Care Overview (Adult)  Goal: Plan of Care Review  Outcome: Ongoing (interventions implemented as appropriate)  Goal: Adult Individualization and Mutuality  Outcome: Ongoing (interventions implemented as appropriate)  Goal: Discharge Needs Assessment  Outcome: Ongoing (interventions implemented as appropriate)    Problem: Pain, Acute (Adult)  Goal: Identify Related Risk Factors and Signs and Symptoms  Outcome: Ongoing (interventions implemented as appropriate)  Goal: Acceptable Pain Control/Comfort Level  Outcome: Ongoing (interventions implemented as appropriate)    Problem: Confusion, Acute (Adult)  Goal: Identify Related Risk Factors and Signs and Symptoms  Outcome: Ongoing (interventions implemented as appropriate)  Goal: Cognitive/Functional Impairments Minimized  Outcome: Ongoing (interventions implemented as appropriate)  Goal: Safety  Outcome: Ongoing (interventions implemented as appropriate)    Problem: Skin Integrity Impairment, Risk/Actual (Adult)  Goal: Identify Related Risk Factors and Signs and Symptoms  Outcome: Ongoing (interventions implemented as appropriate)  Goal: Skin Integrity/Wound Healing  Outcome: Ongoing (interventions implemented as appropriate)    Problem: Pressure Ulcer Risk (Dg Scale) (Adult,Obstetrics,Pediatric)  Goal: Identify Related Risk Factors and Signs and Symptoms  Outcome: Ongoing (interventions implemented as appropriate)  Goal: Skin Integrity  Outcome: Ongoing (interventions implemented as appropriate)    Problem: Pressure Ulcer (Adult)  Goal: Signs and Symptoms of Listed Potential Problems Will be Absent or Manageable (Pressure Ulcer)  Outcome: Ongoing (interventions implemented as appropriate)

## 2017-06-08 LAB — CRP SERPL-MCNC: 6.16 MG/DL (ref 0–1)

## 2017-06-08 PROCEDURE — 97530 THERAPEUTIC ACTIVITIES: CPT

## 2017-06-08 PROCEDURE — G8988 SELF CARE GOAL STATUS: HCPCS

## 2017-06-08 PROCEDURE — 86140 C-REACTIVE PROTEIN: CPT | Performed by: PHYSICIAN ASSISTANT

## 2017-06-08 PROCEDURE — G0378 HOSPITAL OBSERVATION PER HR: HCPCS

## 2017-06-08 PROCEDURE — 99225 PR SBSQ OBSERVATION CARE/DAY 25 MINUTES: CPT | Performed by: INTERNAL MEDICINE

## 2017-06-08 PROCEDURE — G8987 SELF CARE CURRENT STATUS: HCPCS

## 2017-06-08 PROCEDURE — 97167 OT EVAL HIGH COMPLEX 60 MIN: CPT

## 2017-06-08 PROCEDURE — 25010000002 HEPARIN (PORCINE) PER 1000 UNITS: Performed by: INTERNAL MEDICINE

## 2017-06-08 PROCEDURE — 96372 THER/PROPH/DIAG INJ SC/IM: CPT

## 2017-06-08 PROCEDURE — 97110 THERAPEUTIC EXERCISES: CPT

## 2017-06-08 RX ADMIN — ASPIRIN 81 MG 81 MG: 81 TABLET ORAL at 07:59

## 2017-06-08 RX ADMIN — GABAPENTIN 300 MG: 300 CAPSULE ORAL at 20:15

## 2017-06-08 RX ADMIN — SODIUM CHLORIDE 100 ML/HR: 9 INJECTION, SOLUTION INTRAVENOUS at 00:55

## 2017-06-08 RX ADMIN — DOCUSATE SODIUM 100 MG: 100 CAPSULE, LIQUID FILLED ORAL at 07:58

## 2017-06-08 RX ADMIN — BENAZEPRIL HYDROCHLORIDE 10 MG: 5 TABLET, COATED ORAL at 07:59

## 2017-06-08 RX ADMIN — BACLOFEN 10 MG: 10 TABLET ORAL at 23:32

## 2017-06-08 RX ADMIN — AMLODIPINE BESYLATE 5 MG: 5 TABLET ORAL at 07:59

## 2017-06-08 RX ADMIN — COLCHICINE 0.6 MG: 0.6 TABLET, FILM COATED ORAL at 07:59

## 2017-06-08 RX ADMIN — SODIUM CHLORIDE 100 ML/HR: 9 INJECTION, SOLUTION INTRAVENOUS at 23:33

## 2017-06-08 RX ADMIN — CASTOR OIL AND BALSAM, PERU: 788; 87 OINTMENT TOPICAL at 08:00

## 2017-06-08 RX ADMIN — HEPARIN SODIUM 5000 UNITS: 5000 INJECTION, SOLUTION INTRAVENOUS; SUBCUTANEOUS at 13:56

## 2017-06-08 RX ADMIN — ACETAMINOPHEN 500 MG: 500 TABLET ORAL at 09:25

## 2017-06-08 RX ADMIN — BACLOFEN 10 MG: 10 TABLET ORAL at 17:22

## 2017-06-08 RX ADMIN — ACETAMINOPHEN 500 MG: 500 TABLET ORAL at 20:15

## 2017-06-08 RX ADMIN — AMOXICILLIN AND CLAVULANATE POTASSIUM 1 TABLET: 875; 125 TABLET, FILM COATED ORAL at 20:15

## 2017-06-08 RX ADMIN — HEPARIN SODIUM 5000 UNITS: 5000 INJECTION, SOLUTION INTRAVENOUS; SUBCUTANEOUS at 05:06

## 2017-06-08 RX ADMIN — GABAPENTIN 300 MG: 300 CAPSULE ORAL at 13:56

## 2017-06-08 RX ADMIN — AMOXICILLIN AND CLAVULANATE POTASSIUM 1 TABLET: 875; 125 TABLET, FILM COATED ORAL at 07:58

## 2017-06-08 RX ADMIN — BACLOFEN 10 MG: 10 TABLET ORAL at 12:00

## 2017-06-08 RX ADMIN — LISINOPRIL 10 MG: 10 TABLET ORAL at 07:58

## 2017-06-08 RX ADMIN — BACLOFEN 10 MG: 10 TABLET ORAL at 05:06

## 2017-06-08 RX ADMIN — GABAPENTIN 300 MG: 300 CAPSULE ORAL at 05:06

## 2017-06-08 RX ADMIN — ATORVASTATIN CALCIUM 10 MG: 10 TABLET, FILM COATED ORAL at 20:15

## 2017-06-08 RX ADMIN — PANTOPRAZOLE SODIUM 40 MG: 40 TABLET, DELAYED RELEASE ORAL at 05:06

## 2017-06-08 RX ADMIN — Medication 5 MG: at 20:15

## 2017-06-08 RX ADMIN — HEPARIN SODIUM 5000 UNITS: 5000 INJECTION, SOLUTION INTRAVENOUS; SUBCUTANEOUS at 20:15

## 2017-06-08 RX ADMIN — LEVOTHYROXINE SODIUM 75 MCG: 75 TABLET ORAL at 05:06

## 2017-06-08 RX ADMIN — CASTOR OIL AND BALSAM, PERU: 788; 87 OINTMENT TOPICAL at 21:41

## 2017-06-08 RX ADMIN — ALLOPURINOL 200 MG: 100 TABLET ORAL at 07:58

## 2017-06-08 RX ADMIN — DOCUSATE SODIUM 100 MG: 100 CAPSULE, LIQUID FILLED ORAL at 17:21

## 2017-06-08 NOTE — PROGRESS NOTES
"      HOSPITALIST DAILY PROGRESS NOTE    Chief Complaint: lethargy    Subjective   SUBJECTIVE/OVERNIGHT EVENTS   No acute events overnight, patient states that she is feeling better, has no new complain    Review of Systems:  Gen-no fevers, no chills  CV-no chest pain, no palpitations  Resp-no cough, no dyspnea  GI-no N/V/D, no abd pain    Objective   OBJECTIVE   I have reviewed the vital signs.  /65  Pulse 73  Temp 98.5 °F (36.9 °C) (Oral)   Resp 16  Ht 62\" (157.5 cm)  Wt 160 lb (72.6 kg)  SpO2 93%  BMI 29.26 kg/m2    Physical Exam:  Gen-no acute distress  CV-RRR, S1 S2 normal, no m/r/g  Resp-CTAB, no wheezes  Abd-soft, NT, ND, +BS  : alexander in place  Ext-no edema  Neuro-A&Ox3, no focal deficits  Psych-appropriate mood and affect    Results:  I have reviewed the labs, culture data.      Results from last 7 days  Lab Units 06/07/17  0520 06/06/17  0507 06/05/17  1012   WBC 10*3/mm3 7.96 12.97* 17.66*   HEMOGLOBIN g/dL 10.8* 11.2* 12.5   HEMATOCRIT % 33.6* 35.2 38.6   PLATELETS 10*3/mm3 147* 155 162       Results from last 7 days  Lab Units 06/07/17  0520   SODIUM mmol/L 143   POTASSIUM mmol/L 3.7   CHLORIDE mmol/L 111*   TOTAL CO2 mmol/L 30.0   BUN mg/dL 12   CREATININE mg/dL 0.60   GLUCOSE mg/dL 93   CALCIUM mg/dL 9.8     Culture Data:  Cultures:    Blood Culture   Date Value Ref Range Status   06/05/2017 No growth at 2 days  Preliminary   06/05/2017 No growth at 2 days  Preliminary     I have reviewed the medications.    Assessment/Plan   ASSESSMENT/PLAN    Principal Problem:    Somnolence  Active Problems:    Hypertension    Hypothyroid    Hyperlipidemia    GERD (gastroesophageal reflux disease)    Weakness    82 yof, currently at [facility] after being admitted and d/c from [facility] for non-operable intractable back pain (5/10-5/16) she developed right-sided facial droop, right arm weakness, but presented for difficulty to arouse all that have since resolved, etiology of her symptoms are unknown, but suspect " drug induced. However, on presentation here patient had fever and tachycardia with some leukocytosis admited with sepsis source unknown? Colitis ?diverticulitis per imaging      Plan  - MRI with no intracranial abnormalities,  - UA wnl, blood cultures sent NGTD  - started on abx, ID following rec switch to orals Augmentin 875 PO BID x 7 days stop 6/14  - CT abd suggestive of either focal colitis or diverticulitis.  - leukocytosis improving  - reduce IVF to 50 ml/hr, d/c alexander, start voiding trials  - repeat labs in AM      Dispo: will anticipate d/c to SNF, CM following awaiting placement    Ellen Chin MD  06/08/17  5:40 AM

## 2017-06-08 NOTE — DISCHARGE PLACEMENT REQUEST
"Carmen Ta (82 y.o. Female)   To [facility]  From Corinne Vazquez [phone]    Date of Birth Social Security Number Address Home Phone MRN    [id number]  [id number] [id number] [phone] [id number]    Bahai Marital Status          None        Admission Date Admission Type Admitting Provider Attending Provider Department, Room/Bed    6/5/17 Emergency Ellen Chin MD Opii, [place], MD [facility] 5G, S552/1    Discharge Date Discharge Disposition Discharge Destination                      Attending Provider: Ellen Chin MD     Allergies:  No Known Allergies    Isolation:  None   Infection:  None   Code Status:  FULL    Ht:  62\" (157.5 cm)   Wt:  160 lb (72.6 kg)    Admission Cmt:  None   Principal Problem:  Somnolence [R40.0]                 Active Insurance as of 6/5/2017     Primary Coverage     Payor Plan Insurance Group Employer/Plan Group    HUMANA MEDICARE REPLACEMENT HUMANA MEDICARE REPL [id number]     Payor Plan Address Payor Plan Phone Number Effective From Effective To    [address] [address] [phone] 1/1/2013     [place] [address]       Subscriber Name Subscriber Birth Date Member ID       CARMEN TA 1934 [id number]                 Emergency Contacts      (Rel.) Home Phone Work Phone Mobile Phone    Maria Ta (Power of ) [phone] -- [phone]    Ashlee Lam (Daughter) -- -- [phone]               History & Physical      Francisco Alan MD at 6/5/2017  3:40 PM              [facility]  HISTORY AND PHYSICAL    Primary Care Physician: Huang Carnes MD    Subjective     Chief Complaint:  Lethargy, R-Sided Weakness    History of Present Illness:   This Lighthart is an 82-year-old  female who presents to [facility] this afternoon via EMS from [facility] with complaints of right-sided facial droop, right arm weakness, and " chronic right leg weakness.  Patient family is also at the bedside to provide history and report that she has been doing very well at [facility] without issue until this morning.  Patient tells me that this morning that staff had difficulty waking her up.  Her daughter also reports that she had difficulty getting her to arouse. She has been feeling fine throughout the past few days without issue other than constipation, which she has been struggling with since her last admission. She denies any fevers or chills. No headaches, dizziness, or SOA. No CP or palpitations. No difficulty with swallowing. She denies any difficulty urinating, no blood to note in urine. No abdominal pain.     Of note, the patient was recently admitted from 05/10/2017-05/16/2017 for intractable back pain. She was evaluated by neurosurgery and was not a surgical candidate at that time. She has been at [facility] since discharge and was to have a family meeting today to plan discharge and home needs. She also has a morphine pain pump which she has had for quite some time now.     ED work-up revealed troponin 0.01, K+ 4.7, Cr 0.70 (baseline), CRP 7.75, magnesium 1.9. Lactic 1.6, WBC 17.66, procalcitionin 1.45. Blood cultures pending. Urinalysis unremarkable. MRI Brain wo reveals atrophy of the brain, no acute intracranial abnormality. CT chest wo shows atelectatic changes bilaterally. CT abd/pelvis wo reveals stranding identified surrounding abnormal wall thickening of the ascending colon. Findings suggesting either focal colitis or diverticulitis. No abscess collection or free air. She will be admitted at this time to [facility] under Hospital Medicine Services for further evaluation and treatment.       Review of Systems   Constitutional: Positive for activity change, fatigue and fever. Negative for appetite change and chills.   HENT: Negative.    Eyes: Negative for photophobia and visual disturbance.   Respiratory: Negative for choking, shortness of  breath and wheezing.    Cardiovascular: Negative for chest pain, palpitations and leg swelling.   Gastrointestinal: Positive for constipation. Negative for abdominal pain, diarrhea, nausea and vomiting.   Genitourinary: Positive for difficulty urinating. Negative for dysuria.   Musculoskeletal: Negative for arthralgias, back pain, gait problem, joint swelling and myalgias.   Skin: Positive for wound.   Neurological: Positive for tremors, facial asymmetry and weakness. Negative for dizziness, syncope, light-headedness and headaches.   Psychiatric/Behavioral: Negative.    Otherwise complete ROS performed and negative except as mentioned in the HPI.    Past Medical History:   Diagnosis Date   • GERD (gastroesophageal reflux disease) 5/11/2017   • Gout    • Hyperlipidemia 5/11/2017   • Hypertension 5/11/2017   • Hypothyroid 5/11/2017   • Rheumatoid arthritis    • Spinal stenosis        Past Surgical History:   Procedure Laterality Date   • AMPUTATION DIGIT Right     right middle toe   • BACK SURGERY     • EYE SURGERY     • HAND SURGERY     • JOINT REPLACEMENT      left knee   • PERICARDIAL WINDOW         Family History   Problem Relation Age of Onset   • Hypertension Mother    • Arthritis Mother    • Heart attack Father        Social History     Social History   • Marital status:      Spouse name: N/A   • Number of children: N/A   • Years of education: N/A     Occupational History   • Not on file.     Social History Main Topics   • Smoking status: Never Smoker   • Smokeless tobacco: Not on file   • Alcohol use No   • Drug use: No   • Sexual activity: Not on file     Other Topics Concern   • Not on file     Social History Narrative       Medications:  Prescriptions Prior to Admission   Medication Sig Dispense Refill Last Dose   • acetaminophen (TYLENOL) 500 MG tablet Take 500 mg by mouth Every 6 (Six) Hours As Needed for Mild Pain (1-3).      • atorvastatin (LIPITOR) 10 MG tablet Take 10 mg by mouth Every Night.    "   • lisinopril (PRINIVIL,ZESTRIL) 10 MG tablet Take 10 mg by mouth Daily.      • pantoprazole (PROTONIX) 20 MG EC tablet Take 20 mg by mouth Every Morning.      • allopurinol (ZYLOPRIM) 100 MG tablet Take 200 mg by mouth Daily.      • amLODIPine (NORVASC) 5 MG tablet Take 1 tablet by mouth Daily.      • Apoaequorin (PREVAGEN PO) Take 1 tablet by mouth Daily.      • aspirin 81 MG tablet Take 81 mg by mouth Daily.      • baclofen (LIORESAL) 10 MG tablet Take 10 mg by mouth 4 (Four) Times a Day.      • benazepril (LOTENSIN) 10 MG tablet Take 10 mg by mouth Daily.      • castor oil-balsam peru (VENELEX) ointment Apply 1 application topically Every 12 (Twelve) Hours.      • cetirizine (zyrTEC) 10 MG tablet Take 1 tablet by mouth Daily.      • colchicine 0.6 MG tablet Take 0.6 mg by mouth Daily.      • docusate sodium (COLACE) 100 MG capsule Take 100 mg by mouth 2 (Two) Times a Day.      • gabapentin (NEURONTIN) 800 MG tablet Take 1,600 mg by mouth Every 6 (Six) Hours.      • levothyroxine (SYNTHROID, LEVOTHROID) 75 MCG tablet Take 75 mcg by mouth Daily.      • melatonin 5 MG sublingual tablet sublingual tablet Place 1 tablet under the tongue At Night As Needed (sleep).  0    • MORPHINE SULFATE IV Infuse 2.399 mg into a venous catheter Daily. MORPHINE PUMP 2.399 MG PER DAY  IMPLANT INTERNAL LEFT ABDOMEN      • Multiple Vitamin (MULTI VITAMIN PO) Take 1 tablet by mouth Daily.      • Omega-3 Fatty Acids (FISH OIL) 1000 MG capsule capsule Take 1,000 mg by mouth Daily With Breakfast.         Reviewed and reconciled on admission.     Allergies:  No Known Allergies      Objective     Physical Exam:  Vital Signs: /66 (BP Location: Right arm, Patient Position: Lying)  Pulse 75 Comment: Simultaneous filing. User may be unaware of other data.  Temp 98.3 °F (36.8 °C) (Oral)   Resp 16  Ht 62\" (157.5 cm)  Wt 160 lb (72.6 kg)  SpO2 92%  BMI 29.26 kg/m2  Physical Exam   Gen.: Resting in bed in no acute distress, breathing " without any labor and speaking without difficulty.  Neurologic exam: Awake, alert, oriented ×3.  She is in good mood and very cooperative.  Severe weakness of right lower extremity.  HEENT: PERRLA.  Neck: Supple, no cervical lymphadenopathies palpable.  Lungs: Clear to auscultation bilaterally, breathing is without labor, good air movement.  Cardiovascular: S1 and S2 present and normal, regular rate and rhythm, no murmur gallop or rub audible.  GI: Obese, Soft, nontender, not distended, bowel sounds hyperactive.  Extremities: No edema lower extremities.      Results Reviewed:    Results from last 7 days  Lab Units 06/05/17  1012   WBC 10*3/mm3 17.66*   HEMOGLOBIN g/dL 12.5   PLATELETS 10*3/mm3 162       Results from last 7 days  Lab Units 06/05/17  1012   SODIUM mmol/L 139   POTASSIUM mmol/L 4.7   TOTAL CO2 mmol/L 29.0   CREATININE mg/dL 0.70   GLUCOSE mg/dL 102*   CALCIUM mg/dL 9.8     EXAMINATION: MRI BRAIN WO CONTRAST- 06/05/2017      INDICATION: R40.0-Somnolence; A41.9-Sepsis, unspecified organism; mental  status change.      TECHNIQUE: Routine multiplanar imaging was obtained of the brain without  the administration of gadolinium contrast.      COMPARISON: NONE      FINDINGS: There is atrophy identified of the brain. Signal changes seen  scattered throughout the periventricular and subcortical white matter  suggesting chronic small vessel ischemic change. Visualized paranasal  sinuses are clear. Globes and orbits are intact. The mastoid air cells  are patent. No cerebellopontine angle mass identified. Pituitary and  sella are unremarkable. Craniovertebral junction is preserved. No  evidence of restricted diffusion to suggest evidence of an acute  ischemic insult. Flow voids are preserved in the major intracranial  vessels.      IMPRESSION:  Atrophy of the brain with some chronic small vessel ischemic  changes seen in the periventricular and subcortical white matter. No  acute intracranial abnormality is  identified.      D: 06/05/2017  E: 06/05/2017    EXAMINATION: CT ABDOMEN PELVIS WO CONTRAST-, CT CHEST WO CONTRAST-  06/05/2017      INDICATION: fever; R40.0-Somnolence; A41.9-Sepsis, unspecified organism;  focal weakness      TECHNIQUE: Multiple axial CT imaging was obtained of the chest, abdomen  and pelvis without the administration of oral or intravenous contrast.      The radiation dose reduction device was turned on for each scan per the  ALARA (As Low as Reasonably Achievable) protocol.      COMPARISON: 05/10/2017      FINDINGS:       CHEST: Thyroid is homogeneous in appearance. No mediastinal mass or  adenopathy. The cardiac chambers are within normal limits. No  pericardial effusion. Some atelectatic changes identified in the lung  bases bilaterally. The lung apices are clear. No parenchymal  consolidation, pulmonary mass or nodule present. Degenerative changes  seen within the spine. Calcification of subcarinal region suggesting old  healed granulomatous disease.      ABDOMEN: There is mild dilatation identified of the renal collecting  systems bilaterally. Distention identified of the bladder. There is mild  stranding identified of the kidneys. No renal or ureteral stone  identified. There is some stranding identified in the right flank.  Abnormal wall thickening of the ascending colon. Findings may suggest a  focal diverticulitis or colitis. There is a small diverticula identified  in this area. No definite extraluminal air. There is no abscess  collection identified. Fluid in the right paracolic gutter. No  significant fluid in the pelvis. No gallstones identified. Diverticula  identified throughout the remainder of the colon. Pancreas is  homogeneous.      PELVIS: Mild distention of the bladder. Stool within the colon. Pelvic  organs are otherwise unremarkable. No pelvic adenopathy. No free fluid  or free air. Degenerative changes seen within the spine and pelvis.      IMPRESSION:  Atelectatic changes  in lung bases bilaterally. Some  stranding identified surrounding abnormal wall thickening of the  ascending colon. Findings suggesting either focal colitis or  diverticulitis. No abscess collection or free air.      D: 06/05/2017  E: 06/05/2017  I have personally reviewed and interpreted available lab data, radiology studies and ECG obtained at time of admission.     Assessment / Plan     Assessment/Problem List:   Hospital Problem List     Somnolence        *Somnolence and difficulty to arouse this morning.  Etiology is not clear,  however symptoms have resolved.  Currently patient is awake, alert,  and oriented.    * fever and leukocytosis. No source of infection known at this point but ct of abdomin shows some fat stranding suggestive of colitis or diveticulitis.    * HTN    * HLP    * spinal stenosis. Pt was admitted recently and was evaluated by neurosurgery. Currently pt has very minimal motor function in her right LE.    * hypothyroidism.    Plan:  - Admit to telemetry  - Gentle IV hydration.  - oxygen support as needed to keep saturations >92%    -- IS every two hours   - continue vancomycin (pharmacy to dose) and zosyn.  - [facility] recommendations for healing coccyx pressure ulcer   -- air bed ordered  - suppository now   -- manual disimpaction if needed  - anchor alexander catheter for acute urinary retention, after urination of approximately 350ml PT +ml   -- cath care per protocol    -- resend urinalysis from clean cath  - fall precautions  - regular diet, encourage oral intake   - repositioning assistance every two hours  - OOB to chair for meals please   - PT consult   - repeat labs in AM     DVT prophylaxis: TEDS/ SCDS, subq heparin  Code Status: FULL   Admission Status: Patient will be admitted to Inpatient.     Plan of care and treatment discussed and developed with Dr. Alan.     Iona Ramos, RN EXTENDER 06/05/17 3:40 PM  Sincerely and examined at the bedside.  Abdomen the above physical  exam and assessment.  I have reviewed and edited the rest of the above to reflect my medical opinion.  I have discussed the findings and plan of care with the patient and her family at the bedside.         Electronically signed by Francisco Alan MD at 6/5/2017  9:18 PM        Hospital Medications (active)       Dose Frequency Start End    acetaminophen (TYLENOL) tablet 500 mg 500 mg Every 6 Hours PRN 6/5/2017     Sig - Route: Take 1 tablet by mouth Every 6 (Six) Hours As Needed for Mild Pain (1-3). - Oral    allopurinol (ZYLOPRIM) tablet 200 mg 200 mg Daily 6/5/2017     Sig - Route: Take 2 tablets by mouth Daily. - Oral    amLODIPine (NORVASC) tablet 5 mg 5 mg Every 24 Hours Scheduled 6/5/2017     Sig - Route: Take 1 tablet by mouth Daily. - Oral    amoxicillin-clavulanate (AUGMENTIN) 875-125 MG per tablet 1 tablet 1 tablet Every 12 Hours Scheduled 6/7/2017     Sig - Route: Take 1 tablet by mouth Every 12 (Twelve) Hours. - Oral    aspirin chewable tablet 81 mg 81 mg Daily 6/5/2017     Sig - Route: Chew 1 tablet Daily. - Oral    atorvastatin (LIPITOR) tablet 10 mg 10 mg Nightly 6/5/2017     Sig - Route: Take 1 tablet by mouth Every Night. - Oral    baclofen (LIORESAL) tablet 10 mg 10 mg Every 6 Hours Scheduled 6/5/2017     Sig - Route: Take 1 tablet by mouth Every 6 (Six) Hours. - Oral    benazepril (LOTENSIN) tablet 10 mg 10 mg Daily 6/5/2017     Sig - Route: Take 2 tablets by mouth Daily. - Oral    castor oil-balsam peru (VENELEX) ointment  Every 12 Hours Scheduled 6/5/2017     Sig - Route: Apply  topically Every 12 (Twelve) Hours. - Topical    Cosign for Ordering: Accepted by Zabrina Robles MD on 6/7/2017  1:25 AM    colchicine tablet 0.6 mg 0.6 mg Daily 6/5/2017     Sig - Route: Take 1 tablet by mouth Daily. - Oral    docusate sodium (COLACE) capsule 100 mg 100 mg 2 Times Daily 6/5/2017     Sig - Route: Take 1 capsule by mouth 2 (Two) Times a Day. - Oral    gabapentin (NEURONTIN) capsule 300 mg 300 mg Every 8  "Hours Scheduled 6/5/2017     Sig - Route: Take 1 capsule by mouth Every 8 (Eight) Hours. - Oral    heparin (porcine) 5000 UNIT/ML injection 5,000 Units 5,000 Units Every 8 Hours Scheduled 6/5/2017     Sig - Route: Inject 1 mL under the skin Every 8 (Eight) Hours. - Subcutaneous    levothyroxine (SYNTHROID, LEVOTHROID) tablet 75 mcg 75 mcg Every Early Morning 6/6/2017     Sig - Route: Take 1 tablet by mouth Every Morning. - Oral    lisinopril (PRINIVIL,ZESTRIL) tablet 10 mg 10 mg Daily 6/5/2017     Sig - Route: Take 1 tablet by mouth Daily. - Oral    melatonin sublingual tablet 5 mg 5 mg Nightly PRN 6/5/2017     Sig - Route: Place 1 tablet under the tongue At Night As Needed (sleep). - Sublingual    Morphine iv pump  Continuous 6/5/2017     Sig - Route: Continuous. - Does not apply    ondansetron (ZOFRAN) injection 4 mg 4 mg Every 6 Hours PRN 6/5/2017     Sig - Route: Infuse 2 mL into a venous catheter Every 6 (Six) Hours As Needed for Nausea or Vomiting. - Intravenous    Linked Group 1:  \"Or\" Linked Group Details        ondansetron (ZOFRAN) tablet 4 mg 4 mg Every 6 Hours PRN 6/5/2017     Sig - Route: Take 1 tablet by mouth Every 6 (Six) Hours As Needed for Nausea or Vomiting. - Oral    Linked Group 1:  \"Or\" Linked Group Details        pantoprazole (PROTONIX) EC tablet 40 mg 40 mg Every Early Morning 6/6/2017     Sig - Route: Take 1 tablet by mouth Every Morning. - Oral    sodium chloride 0.9 % flush 1-10 mL 1-10 mL As Needed 6/5/2017     Sig - Route: Infuse 1-10 mL into a venous catheter As Needed for Line Care. - Intravenous    sodium chloride 0.9 % infusion 100 mL/hr Continuous 6/5/2017     Sig - Route: Infuse 100 mL/hr into a venous catheter Continuous. - Intravenous             Physician Progress Notes (last 24 hours) (Notes from 6/7/2017  2:09 PM through 6/8/2017  2:09 PM)      Kuldip Calvert MD at 6/8/2017  8:25 AM  Version 3 of 3         [facility] Progress Note    Date of Admission: " "2017      Antibiotics:  Augmentin    CC:   Chief Complaint   Patient presents with   • Altered Mental Status       S: No new changes. Ongoing weakness. Difficulty feeding herself. Some tremor/    O:  /65  Pulse 73  Temp 98.5 °F (36.9 °C) (Oral)   Resp 16  Ht 62\" (157.5 cm)  Wt 160 lb (72.6 kg)  SpO2 93%  BMI 29.26 kg/m2  Temp (24hrs), Av.5 °F (36.9 °C), Min:98.5 °F (36.9 °C), Max:98.5 °F (36.9 °C)    GENERAL: Awake and alert, in no acute distress. Eating breakfast. NAD. Good historian   HEENT: Normocephalic, atraumatic. PERRL. EOMI. No conjunctival injection. No icterus. Oropharynx clear without evidence of thrush or exudate. No evidence of peridontal disease. Dry MM   NECK: Supple without nuchal rigidity  LYMPH: No cervical, axillary or inguinal lymphadenopathy. No neck masses  HEART: RRR; No murmur, rubs, gallops.   LUNGS: Clear to auscultation bilaterally without wheezing, rales, rhonchi. Normal respiratory effort. Use of Supplemental O2 at 2 L   ABDOMEN: Soft, nontender, nondistended. Positive bowel sounds. No rebound or guarding. Pain pump pocket palpable and nontender  EXT: No joint effusions. Limited mobility. Difficulty walking  : Normal appearing genitalia with Gilbert catheter and clear urine.   MSK: Essential tremor upper extremities. No joint effusions. Nonambulatory.   SKIN: Warm and dry without cutaneous eruptions.   NEURO: Oriented to PPT. No focal deficits.   PSYCHIATRIC: Normal insight and judgement. Cooperative with PE    Laboratory Data      Results from last 7 days  Lab Units 17  0520 17  0507 17  1012   WBC 10*3/mm3 7.96 12.97* 17.66*   HEMOGLOBIN g/dL 10.8* 11.2* 12.5   HEMATOCRIT % 33.6* 35.2 38.6   PLATELETS 10*3/mm3 147* 155 162       Results from last 7 days  Lab Units 17  0520   SODIUM mmol/L 143   POTASSIUM mmol/L 3.7   CHLORIDE mmol/L 111*   TOTAL CO2 mmol/L 30.0   BUN mg/dL 12   CREATININE mg/dL 0.60   GLUCOSE mg/dL 93   CALCIUM mg/dL 9.8 "       Results from last 7 days  Lab Units 06/05/17  1012   ALK PHOS U/L 107*   BILIRUBIN mg/dL 0.7   ALT (SGPT) U/L 31   AST (SGOT) U/L 24       Results from last 7 days  Lab Units 06/05/17  1012   SED RATE mm/hr 26       Results from last 7 days  Lab Units 06/08/17  0522   CRP mg/dL 6.16*   Improved    Estimated Creatinine Clearance: 50.6 mL/min (by C-G formula based on Cr of 0.6).      Microbiology:  Blood culture: NGSF x 2    Radiology:  Imaging Results (last 24 hours)     ** No results found for the last 24 hours. **        PROBLEM LIST:   Ascending Colitis  Leukocytosis  Chronic constipation  Descending colon diverticula  R arm and facial drooping- MRI negative for CVA  Symptoms resolved  Severe spinal stenosis with degenerative disc and collapse  Chronic back pain with indwelling morphine pain pump   HTN, HLP, hypothyroidism      ASSESSMENT:  Patient is an 82-year-old female with a history of chronic back pain with severe spinal stenosis and degenerative disc disease with vertebral collapse noted on recent MRI of the lumbar spine. She is a nonsurgical candidate with a chronic indwelling pain pump. She is admitted from [facility] secondary to lethargy, confusion, right arm weakness and right-sided facial droop. MRI performed at admission negative for acute CVA. She was found to have leukocytosis and imaging of the abdomen, pelvis, chest showed descending colitis with coli on it diverticula noted. No free fluid or evidence of abscess or perforation. She is on vancomycin and Zosyn at this time with infectious disease consultation for treatment recommendations.  She does have a history of chronic constipation. The cultures collected and negative. She is responded well to suppository use. At this time, would de-escalate antibiotics to Augmentin to cover for mild colitis as white blood cell count is now normal with no other clear source of infection other than ascending colitis on CT scan  "     PLAN:  Augmentin 875 PO BID x 7 days in chart  Plan for PO therapy at discharge. Seeking placement options  Probiotic x 1 month with Rx in chart  Ok with discharge when all others agree    UM:  DC planning on oral antibiotics    Dr. Kuldip Calvert saw the patient, performed the physical exam, reviewed the laboratory data and guided with the formulation of the above problem list, assessment and treatment plan.     Kuldip Calvert MD  2017         Electronically signed by Kuldip Calvert MD at 2017 10:30 AM      ELANA Khan at 2017  8:25 AM  Version 2 of 3         [facility] Progress Note    Date of Admission: 2017      Antibiotics:  Augmentin    CC:   Chief Complaint   Patient presents with   • Altered Mental Status       S: No new changes. Ongoing weakness. Difficulty feeding herself. Some tremor/    O:  /65  Pulse 73  Temp 98.5 °F (36.9 °C) (Oral)   Resp 16  Ht 62\" (157.5 cm)  Wt 160 lb (72.6 kg)  SpO2 93%  BMI 29.26 kg/m2  Temp (24hrs), Av.5 °F (36.9 °C), Min:98.5 °F (36.9 °C), Max:98.5 °F (36.9 °C)    GENERAL: Awake and alert, in no acute distress. Eating breakfast. NAD. Good historian   HEENT: Normocephalic, atraumatic. PERRL. EOMI. No conjunctival injection. No icterus. Oropharynx clear without evidence of thrush or exudate. No evidence of peridontal disease. Dry MM   NECK: Supple without nuchal rigidity  LYMPH: No cervical, axillary or inguinal lymphadenopathy. No neck masses  HEART: RRR; No murmur, rubs, gallops.   LUNGS: Clear to auscultation bilaterally without wheezing, rales, rhonchi. Normal respiratory effort. Use of Supplemental O2 at 2 L   ABDOMEN: Soft, nontender, nondistended. Positive bowel sounds. No rebound or guarding. Pain pump pocket palpable and nontender  EXT: No joint effusions. Limited mobility. Difficulty walking  : Normal appearing genitalia with Gilbert catheter and clear urine.   MSK: Essential tremor upper extremities. No joint effusions. Nonambulatory. "   SKIN: Warm and dry without cutaneous eruptions.   NEURO: Oriented to PPT. No focal deficits.   PSYCHIATRIC: Normal insight and judgement. Cooperative with PE    Laboratory Data      Results from last 7 days  Lab Units 06/07/17  0520 06/06/17  0507 06/05/17  1012   WBC 10*3/mm3 7.96 12.97* 17.66*   HEMOGLOBIN g/dL 10.8* 11.2* 12.5   HEMATOCRIT % 33.6* 35.2 38.6   PLATELETS 10*3/mm3 147* 155 162       Results from last 7 days  Lab Units 06/07/17  0520   SODIUM mmol/L 143   POTASSIUM mmol/L 3.7   CHLORIDE mmol/L 111*   TOTAL CO2 mmol/L 30.0   BUN mg/dL 12   CREATININE mg/dL 0.60   GLUCOSE mg/dL 93   CALCIUM mg/dL 9.8       Results from last 7 days  Lab Units 06/05/17  1012   ALK PHOS U/L 107*   BILIRUBIN mg/dL 0.7   ALT (SGPT) U/L 31   AST (SGOT) U/L 24       Results from last 7 days  Lab Units 06/05/17  1012   SED RATE mm/hr 26       Results from last 7 days  Lab Units 06/08/17  0522   CRP mg/dL 6.16*   Improved    Estimated Creatinine Clearance: 50.6 mL/min (by C-G formula based on Cr of 0.6).      Microbiology:  Blood culture: NGSF x 2    Radiology:  Imaging Results (last 24 hours)     ** No results found for the last 24 hours. **        PROBLEM LIST:   Ascending Colitis  Leukocytosis  Chronic constipation  Descending colon diverticula  R arm and facial drooping- MRI negative for CVA  Symptoms resolved  Severe spinal stenosis with degenerative disc and collapse  Chronic back pain with indwelling morphine pain pump   HTN, HLP, hypothyroidism      ASSESSMENT:  Patient is an 82-year-old female with a history of chronic back pain with severe spinal stenosis and degenerative disc disease with vertebral collapse noted on recent MRI of the lumbar spine. She is a nonsurgical candidate with a chronic indwelling pain pump. She is admitted from [facility] secondary to lethargy, confusion, right arm weakness and right-sided facial droop. MRI performed at admission negative for acute CVA. She was found  "to have leukocytosis and imaging of the abdomen, pelvis, chest showed descending colitis with coli on it diverticula noted. No free fluid or evidence of abscess or perforation. She is on vancomycin and Zosyn at this time with infectious disease consultation for treatment recommendations.  She does have a history of chronic constipation. The cultures collected and negative. She is responded well to suppository use. At this time, would de-escalate antibiotics to Augmentin to cover for mild colitis as white blood cell count is now normal with no other clear source of infection other than ascending colitis on CT scan      PLAN:  Augmentin 875 PO BID x 7 days in chart  Plan for PO therapy at discharge. Seeking placement options  Probiotic x 1 month with Rx in chart  Ok with discharge when all others agree    UM:  DC planning on oral antibiotics  Dr. Kuldip Calvert saw the patient, performed the physical exam, reviewed the laboratory data and guided with the formulation of the above problem list, assessment and treatment plan.     Kuldip Calvert MD  2017         Electronically signed by ELANA Khan at 2017  9:25 AM      ELANA Khan at 2017  8:25 AM  Version 1 of 3         [facility] Progress Note    Date of Admission: 2017      Antibiotics:  Augmentin    CC:   Chief Complaint   Patient presents with   • Altered Mental Status       S: No new changes. Ongoing weakness. Difficulty feeding herself. Some tremor/    O:  /65  Pulse 73  Temp 98.5 °F (36.9 °C) (Oral)   Resp 16  Ht 62\" (157.5 cm)  Wt 160 lb (72.6 kg)  SpO2 93%  BMI 29.26 kg/m2  Temp (24hrs), Av.5 °F (36.9 °C), Min:98.5 °F (36.9 °C), Max:98.5 °F (36.9 °C)    GENERAL: Awake and alert, in no acute distress. Eating breakfast. NAD. Good historian   HEENT: Normocephalic, atraumatic. PERRL. EOMI. No conjunctival injection. No icterus. Oropharynx clear without evidence of thrush or exudate. No evidence of peridontal disease. Dry MM "   NECK: Supple without nuchal rigidity  LYMPH: No cervical, axillary or inguinal lymphadenopathy. No neck masses  HEART: RRR; No murmur, rubs, gallops.   LUNGS: Clear to auscultation bilaterally without wheezing, rales, rhonchi. Normal respiratory effort. Use of Supplemental O2 at 2 L   ABDOMEN: Soft, nontender, nondistended. Positive bowel sounds. No rebound or guarding. Pain pump pocket palpable and nontender  EXT: No joint effusions. Limited mobility. Difficulty walking  : Normal appearing genitalia with Gilbert catheter and clear urine.   MSK: Essential tremor upper extremities. No joint effusions. Nonambulatory.   SKIN: Warm and dry without cutaneous eruptions.   NEURO: Oriented to PPT. No focal deficits.   PSYCHIATRIC: Normal insight and judgement. Cooperative with PE    Laboratory Data      Results from last 7 days  Lab Units 06/07/17  0520 06/06/17  0507 06/05/17  1012   WBC 10*3/mm3 7.96 12.97* 17.66*   HEMOGLOBIN g/dL 10.8* 11.2* 12.5   HEMATOCRIT % 33.6* 35.2 38.6   PLATELETS 10*3/mm3 147* 155 162       Results from last 7 days  Lab Units 06/07/17  0520   SODIUM mmol/L 143   POTASSIUM mmol/L 3.7   CHLORIDE mmol/L 111*   TOTAL CO2 mmol/L 30.0   BUN mg/dL 12   CREATININE mg/dL 0.60   GLUCOSE mg/dL 93   CALCIUM mg/dL 9.8       Results from last 7 days  Lab Units 06/05/17  1012   ALK PHOS U/L 107*   BILIRUBIN mg/dL 0.7   ALT (SGPT) U/L 31   AST (SGOT) U/L 24       Results from last 7 days  Lab Units 06/05/17  1012   SED RATE mm/hr 26       Results from last 7 days  Lab Units 06/08/17  0522   CRP mg/dL 6.16*   Improved    Estimated Creatinine Clearance: 50.6 mL/min (by C-G formula based on Cr of 0.6).      Microbiology:  Blood culture: NGSF x 2    Radiology:  Imaging Results (last 24 hours)     ** No results found for the last 24 hours. **        PROBLEM LIST:   Ascending Colitis  Leukocytosis  Chronic constipation  Descending colon diverticula  R arm and facial drooping- MRI negative for CVA  Symptoms  resolved  Severe spinal stenosis with degenerative disc and collapse  Chronic back pain with indwelling morphine pain pump   HTN, HLP, hypothyroidism      ASSESSMENT:  Patient is an 82-year-old female with a history of chronic back pain with severe spinal stenosis and degenerative disc disease with vertebral collapse noted on recent MRI of the lumbar spine. She is a nonsurgical candidate with a chronic indwelling pain pump. She is admitted from [facility] secondary to lethargy, confusion, right arm weakness and right-sided facial droop. MRI performed at admission negative for acute CVA. She was found to have leukocytosis and imaging of the abdomen, pelvis, chest showed descending colitis with coli on it diverticula noted. No free fluid or evidence of abscess or perforation. She is on vancomycin and Zosyn at this time with infectious disease consultation for treatment recommendations.  She does have a history of chronic constipation. The cultures collected and negative. She is responded well to suppository use. At this time, would de-escalate antibiotics to Augmentin to cover for mild colitis as white blood cell count is now normal with no other clear source of infection other than a sending colitis on CT scan      PLAN:  Augmentin 875 PO BID x 7 days in chart  Plan for PO therapy at discharge. Seeking placement options    UM:  DC planning on oral antibiotics  Dr. Kuldip Calvert saw the patient, performed the physical exam, reviewed the laboratory data and guided with the formulation of the above problem list, assessment and treatment plan.     Kuldip Calvert MD  6/8/2017         Electronically signed by ELANA Khan at 6/8/2017  8:28 AM      Ellen Chin MD at 6/8/2017 10:18 AM  Version 1 of 1               HOSPITALIST DAILY PROGRESS NOTE    Chief Complaint: lethargy    Subjective   SUBJECTIVE/OVERNIGHT EVENTS   No acute events overnight, patient states that she is feeling better, has  "no new complain    Review of Systems:  Gen-no fevers, no chills  CV-no chest pain, no palpitations  Resp-no cough, no dyspnea  GI-no N/V/D, no abd pain    Objective   OBJECTIVE   I have reviewed the vital signs.  /65  Pulse 73  Temp 98.5 °F (36.9 °C) (Oral)   Resp 16  Ht 62\" (157.5 cm)  Wt 160 lb (72.6 kg)  SpO2 93%  BMI 29.26 kg/m2    Physical Exam:  Gen-no acute distress  CV-RRR, S1 S2 normal, no m/r/g  Resp-CTAB, no wheezes  Abd-soft, NT, ND, +BS  : alexander in place  Ext-no edema  Neuro-A&Ox3, no focal deficits  Psych-appropriate mood and affect    Results:  I have reviewed the labs, culture data.      Results from last 7 days  Lab Units 06/07/17  0520 06/06/17  0507 06/05/17  1012   WBC 10*3/mm3 7.96 12.97* 17.66*   HEMOGLOBIN g/dL 10.8* 11.2* 12.5   HEMATOCRIT % 33.6* 35.2 38.6   PLATELETS 10*3/mm3 147* 155 162       Results from last 7 days  Lab Units 06/07/17  0520   SODIUM mmol/L 143   POTASSIUM mmol/L 3.7   CHLORIDE mmol/L 111*   TOTAL CO2 mmol/L 30.0   BUN mg/dL 12   CREATININE mg/dL 0.60   GLUCOSE mg/dL 93   CALCIUM mg/dL 9.8     Culture Data:  Cultures:    Blood Culture   Date Value Ref Range Status   06/05/2017 No growth at 2 days  Preliminary   06/05/2017 No growth at 2 days  Preliminary     I have reviewed the medications.    Assessment/Plan   ASSESSMENT/PLAN    Principal Problem:    Somnolence  Active Problems:    Hypertension    Hypothyroid    Hyperlipidemia    GERD (gastroesophageal reflux disease)    Weakness    82 yof, currently at [facility] after being admitted and d/c from [facility] for non-operable intractable back pain (5/10-5/16) she developed right-sided facial droop, right arm weakness, but presented for difficulty to arouse all that have since resolved, etiology of her symptoms are unknown, but suspect drug induced. However, on presentation here patient had fever and tachycardia with some leukocytosis admited with sepsis source unknown? Colitis ?diverticulitis per imaging      Plan  - " MRI with no intracranial abnormalities,  - UA wnl, blood cultures sent NGTD  - started on abx, ID following rec switch to orals Augmentin 875 PO BID x 7 days stop 6/14  - CT abd suggestive of either focal colitis or diverticulitis.  - leukocytosis improving  - reduce IVF to 50 ml/hr, d/c alexander, start voiding trials  - repeat labs in AM      Dispo: will anticipate d/c to SNF, CM following awaiting placement    Ellen Chin MD  06/08/17  5:40 AM             Electronically signed by Ellen Chin MD at 6/8/2017 10:18 AM           Physical Therapy Notes (last 24 hours) (Notes from 6/7/2017  2:09 PM through 6/8/2017  2:09 PM)      Lisa Shannon, PT at 6/8/2017 12:28 PM  Version 1 of 1         Problem: Patient Care Overview (Adult)  Goal: Plan of Care Review  Outcome: Ongoing (interventions implemented as appropriate)    06/08/17 1223   Coping/Psychosocial Response Interventions   Plan Of Care Reviewed With patient;family   Outcome Evaluation   Outcome Summary/Follow up Plan Pt unable to achieve standing transfer or gait today. R leg disabled, L leg buckling. Sling used for transfer to chair         Problem: Inpatient Physical Therapy  Goal: Bed Mobility Goal LTG- PT  Outcome: Ongoing (interventions implemented as appropriate)    06/08/17 1223   Bed Mobility PT LTG   Bed Mobility PT LTG, Outcome goal ongoing       Goal: Transfer Training Goal 1 LTG- PT  Outcome: Ongoing (interventions implemented as appropriate)    06/08/17 1223   Transfer Training PT LTG   Transfer Training PT LTG, Outcome goal ongoing       Goal: Gait Training Goal LTG- PT  Outcome: Ongoing (interventions implemented as appropriate)    06/08/17 1223   Gait Training PT LTG   Gait Training Goal PT LTG, Outcome goal ongoing       Goal: Static Standing Balance Goal LTG- PT  Outcome: Ongoing (interventions implemented as appropriate)    06/08/17 1223   Static Standing Balance PT LTG   Static Standing Balance PT LTG, Outcome goal ongoing               Electronically signed by Lisa Shannon PT at 2017 12:28 PM      Lisa Shannon PT at 2017 12:32 PM  Version 1 of 1         Acute Care - Physical Therapy Treatment Note  EILEEN Terrell     Patient Name: Katherin Ta  : 1934  MRN: [id number]  Today's Date: 2017  Onset of Illness/Injury or Date of Surgery Date: 17  Date of Referral to PT: 17  Referring Physician: MD Giuseppe    Admit Date: 2017    Visit Dx:    ICD-10-CM ICD-9-CM   1. Somnolence R40.0 780.09   2. Sepsis, due to unspecified organism A41.9 038.9     995.91   3. Impaired functional mobility, balance, gait, and endurance Z74.09 V49.89   4. Impaired mobility and ADLs Z74.09 799.89     Patient Active Problem List   Diagnosis   • Rheumatoid arthritis   • Spinal stenosis   • Hypertension   • Hypothyroid   • Hyperlipidemia   • GERD (gastroesophageal reflux disease)   • Intractable back pain   • Weakness   • Somnolence               Adult Rehabilitation Note       17 1025 17 1045       Rehab Assessment/Intervention    Discipline physical therapist  -BD physical therapy assistant  -AS     Document Type therapy note (daily note)  -BD therapy note (daily note)  -AS     Subjective Information agree to therapy;complains of;weakness  -BD agree to therapy;complains of;weakness  -AS     Patient Effort, Rehab Treatment good  -BD good  -AS     Symptoms Noted During/After Treatment fatigue  -BD      Precautions/Limitations fall precautions;oxygen therapy device and L/min  -BD fall precautions  -AS     Precautions/Limitations, Vision WFL with corrective lenses  -BD      Recorded by [BD] Lisa Shannon, PT [AS] Nelly Fink, PUJA     Pain Assessment    Pain Assessment No/denies pain  -BD No/denies pain  -AS     Recorded by [BD] Lisa Shannon, PT [AS] Nelly Fink, PUJA     Vision Assessment/Intervention    Visual Impairment WNL  -BD      Recorded by [BD] Lisa Shannon, PT      Cognitive  Assessment/Intervention    Current Cognitive/Communication Assessment functional  -BD functional  -AS     Orientation Status oriented x 4  -BD oriented to;person;place  -AS     Follows Commands/Answers Questions 100% of the time;able to follow single-step instructions;needs cueing  -% of the time;able to follow single-step instructions  -AS     Personal Safety WNL/WFL  -BD WNL/WFL  -AS     Personal Safety Interventions fall prevention program maintained;gait belt;nonskid shoes/slippers when out of bed;supervised activity  -BD fall prevention program maintained;gait belt;nonskid shoes/slippers when out of bed  -AS     Recorded by [BD] Lisa Shannon, PT [AS] Nelly Fink PTA     Bed Mobility, Assessment/Treatment    Bed Mobility, Assistive Device draw sheet  -BD draw sheet  -AS     Bed Mobility, Roll Left, [place] verbal cues required;maximum assist (25% patient effort)  -BD verbal cues required;maximum assist (25% patient effort);2 person assist required  -AS     Bed Mobility, Roll Right, [place] verbal cues required;maximum assist (25% patient effort)  -BD verbal cues required;maximum assist (25% patient effort);2 person assist required;other (see comments)   to position lift sling  -AS     Bed Mobility, Comment Pt log rolled for  lift placement.  -BD patient rolled to place lift sling  -AS     Recorded by [BD] Lisa Shannon, PT [AS] Nelly Fink, PUJA     Transfer Assessment/Treatment    Transfers, Bed-Chair [place] dependent (less than 25% patient effort);unable to perform;other (see comments)   Sling used to transfer pt bed to chair.  -BD dependent (less than 25% patient effort);2 person assist required  -AS     Transfers, Bed-Chair-Bed, Assist Device  mechanical lift  -AS     Transfers, Sit-Stand [place] maximum assist (25% patient effort);2 person assist required  -BD      Transfers, Stand-Sit [place] verbal cues required;maximum assist (25% patient effort);2  person assist required;other (see comments)   knees buckling (L) and no assist R leg.  -BD      Transfer, Comment 3 x max assist sit to stand at bedside.  -BD      Recorded by [BD] Lisa Shannon PT [AS] Nelly Fink PTA     Gait Assessment/Treatment    Gait, Story Level not appropriate to assess  -BD not appropriate to assess  -AS     Recorded by [BD] Lisa Shannon PT [AS] Nelly Fink PTA     Stairs Assessment/Treatment    Stairs, Impairments unable to maintain weight bearing status  -BD      Recorded by [BD] Lisa Shannon PT      Motor Skills/Interventions    Additional Documentation Balance Skills Training (Group)  -BD      Recorded by [BD] Lisa Shannon PT      Balance Skills Training    Training Strategies (Balance Skills) sitting bal in bed and Max assist standing with support at side of bed.  -BD      Sitting-Level of Assistance Close supervision;Minimum assistance  -BD      Sitting-Balance Support Feet supported;Right upper extremity supported  -BD      Sitting-Balance Activities Trunk control activities  -BD      Standing-Level of Assistance Maximum assistance;Dependent  -BD      Static Standing Balance Support Right upper extremity supported;Left upper extremity supported  -BD      Standing-Balance Activities Weight Shift R-L  -BD      Standing Balance # of Minutes 1   total of 3 assists to stand from sitting on EOB  -BD      Gait Balance-Level of Assistance --   Gait not possible  -BD      Recorded by [BD] Lisa Shannon PT      Therapy Exercises    Right Lower Extremity  PROM:;10 reps;supine;ankle pumps/circles;calf stretch;hip abduction/adduction;heel slides  -AS     Left Lower Extremity  AAROM:;10 reps;supine;ankle pumps/circles;calf stretch;hip abduction/adduction;heel slides  -AS     Bilateral Lower Extremities AROM:;supine;ankle pumps/circles;hip IR;hip flexion;knee flexion  -BD      Recorded by [BD] Lisa Shannon, RENE [AS] Nelly Fink,  PTA     Positioning and Restraints    Pre-Treatment Position in bed  -BD in bed  -AS     Post Treatment Position chair  -BD chair  -AS     In Chair reclined;call light within reach;encouraged to call for assist;exit alarm on;with family/caregiver;on mechanical lift sling;legs elevated;heels elevated  -BD reclined;call light within reach;encouraged to call for assist;with family/caregiver;on mechanical lift sling  -AS     Recorded by [BD] Lisa Shannon, PT [AS] Nelly Fink PTA       User Key  (r) = Recorded By, (t) = Taken By, (c) = Cosigned By    Initials Name Effective Dates    AS Nelly Fink, PTA 06/22/15 -     BD Lisa Shannon, PT 06/13/16 -                 IP PT Goals       06/08/17 1223 06/07/17 1109 06/06/17 1350    Bed Mobility PT LTG    Bed Mobility PT LTG, Date Established   06/06/17  -MB    Bed Mobility PT LTG, Time to Achieve   1 wk  -MB    Bed Mobility PT LTG, Activity Type   supine to sit/sit to supine;roll left/roll right  -MB    Bed Mobility PT LTG, [place] Level   moderate assist (50% patient effort);2 person assist required  -MB    Bed Mobility PT LTG, Date Goal Reviewed  06/07/17  -AS     Bed Mobility PT LTG, Outcome goal ongoing  -BD goal ongoing  -AS goal ongoing  -MB    Transfer Training PT LTG    Transfer Training PT LTG, Date Established   06/06/17  -MB    Transfer Training PT LTG, Time to Achieve   1 wk  -MB    Transfer Training PT LTG, Activity Type   bed to chair /chair to bed;sit to stand/stand to sit  -MB    Transfer Training PT LTG, [place] Level   maximum assist (25% patient effort);2 person assist required  -MB    Transfer Training PT LTG, Assist Device   walker, rolling   ARJO  -MB    Transfer Training PT  LTG, Date Goal Reviewed  06/07/17  -AS     Transfer Training PT LTG, Outcome goal ongoing  -BD goal ongoing  -AS goal ongoing  -MB    Gait Training PT LTG    Gait Training Goal PT LTG, Date Established   06/06/17  -MB    Gait Training Goal PT LTG,  Time to Achieve   1 wk  -MB    Gait Training Goal PT LTG, [place] Level   maximum assist (25% patient effort);2 person assist required  -MB    Gait Training Goal PT LTG, Assist Device   --   ARJO  -MB    Gait Training Goal PT LTG, Distance to Achieve   5  -MB    Gait Training Goal PT LTG, Date Goal Reviewed  06/07/17  -AS     Gait Training Goal PT LTG, Outcome goal ongoing  -BD goal ongoing  -AS goal ongoing  -MB    Static Standing Balance PT LTG    Static Standing Balance PT LTG, Date Established   06/06/17  -MB    Static Standing Balance PT LTG, Time to Achieve   1 wk  -MB    Static Standing Balance PT LTG, [place] Level   maximum assist (25% patient effort);2 person assist required  -MB    Static Standing Balance PT LTG, Assist Device   assistive Device   ARJO  -MB    Static Standing Balance PT LTG, Date Goal Reviewed  06/07/17  -AS     Static Standing Balance PT LTG, Outcome goal ongoing  -BD goal ongoing  -AS goal ongoing  -MB      User Key  (r) = Recorded By, (t) = Taken By, (c) = Cosigned By    Initials Name Provider Type    AS Nelly Fink, PTA Physical Therapy Assistant    BRYANNA Sterling, PT Physical Therapist    SANDHYA Shannon, PT Physical Therapist          Physical Therapy Education     Title: PT OT SLP Therapies (Active)     Topic: Physical Therapy (Active)     Point: Mobility training (Active)    Learning Progress Summary    Learner Readiness Method Response Comment Documented by Status   Patient Acceptance E,D NR   06/08/17 1221 Active    Acceptance E,D NR   06/08/17 1220 Active    Acceptance E NR  AS 06/07/17 1109 Active    Acceptance E,TB KARLA SANDERS   06/07/17 0558 Done    Acceptance E,D NR D/C planning, home safety, fall precautions, POC progression MB 06/06/17 1349 Active   Family Acceptance E,D NR   06/08/17 1221 Active    Acceptance E,D NR  BD 06/08/17 1220 Active    Acceptance E NR  AS 06/07/17 1109 Active    Acceptance E,D NR D/C planning, home safety, fall  precautions, POC progression MB 06/06/17 1349 Active               Point: Home exercise program (Active)    Learning Progress Summary    Learner Readiness Method Response Comment Documented by Status   Patient Acceptance E,D NR   06/08/17 1221 Active    Acceptance E,D NR   06/08/17 1220 Active    Acceptance E NR  AS 06/07/17 1109 Active    Acceptance E,TB KARLA SANDERS   06/07/17 0558 Done    Acceptance E,D NR D/C planning, home safety, fall precautions, POC progression MB 06/06/17 1349 Active   Family Acceptance E,D NR   06/08/17 1221 Active    Acceptance E,D NR   06/08/17 1220 Active    Acceptance E NR  AS 06/07/17 1109 Active    Acceptance E,D NR D/C planning, home safety, fall precautions, POC progression MB 06/06/17 1349 Active               Point: Body mechanics (Active)    Learning Progress Summary    Learner Readiness Method Response Comment Documented by Status   Patient Acceptance E,D NR   06/08/17 1221 Active    Acceptance E,D NR   06/08/17 1220 Active    Acceptance E NR  AS 06/07/17 1109 Active    Acceptance E,TB KARLA SANDERS   06/07/17 0558 Done    Acceptance E,D NR D/C planning, home safety, fall precautions, POC progression MB 06/06/17 1349 Active   Family Acceptance E,D NR   06/08/17 1221 Active    Acceptance E,D NR   06/08/17 1220 Active    Acceptance E NR  AS 06/07/17 1109 Active    Acceptance E,D NR D/C planning, home safety, fall precautions, POC progression MB 06/06/17 1349 Active               Point: Precautions (Active)    Learning Progress Summary    Learner Readiness Method Response Comment Documented by Status   Patient Acceptance E,D NR   06/08/17 1221 Active    Acceptance E,D NR   06/08/17 1220 Active    Acceptance E NR  AS 06/07/17 1109 Active    Acceptance E,TB VUKARLA   06/07/17 0558 Done    Acceptance E,D NR D/C planning, home safety, fall precautions, POC progression MB 06/06/17 1349 Active   Family Acceptance E,D NR   06/08/17 1221 Active    Acceptance E,D NR   06/08/17  1220 Active    Acceptance E NR  AS 06/07/17 1109 Active    Acceptance E,D NR D/C planning, home safety, fall precautions, POC progression MB 06/06/17 1349 Active                      User Key     Initials Effective Dates Name Provider Type Discipline    AS 06/22/15 -  Nelly Fink, PTA Physical Therapy Assistant PT    MB 03/14/16 -  Vero Sterling, PT Physical Therapist PT     06/13/16 -  Lisa Shannon, PT Physical Therapist PT     08/22/16 -  Carole Prado, RN Registered Nurse Nurse                    PT Recommendation and Plan  Anticipated Equipment Needs At Discharge: bedside commode, tub bench, wheelchair (If D/C to home.  N/A if D/C to SNF.)  Anticipated Discharge Disposition: skilled nursing facility  Planned Therapy Interventions: balance training, bed mobility training, gait training, home exercise program, patient/family education, strengthening, transfer training  PT Frequency: daily  Plan of Care Review  Plan Of Care Reviewed With: patient, family  Outcome Summary/Follow up Plan: Pt unable to achieve standing transfer or gait  today. R leg disabled, L leg buckling. Sling used for transfer to chair          Outcome Measures       06/08/17 1029 06/08/17 1025 06/07/17 1045    How much help from another person do you currently need...    Turning from your back to your side while in flat bed without using bedrails?  2  -BD 2  -AS    Moving from lying on back to sitting on the side of a flat bed without bedrails?  2  -BD 2  -AS    Moving to and from a bed to a chair (including a wheelchair)?  1  -BD 1  -AS    Standing up from a chair using your arms (e.g., wheelchair, bedside chair)?  1  -BD 1  -AS    Climbing 3-5 steps with a railing?  1  -BD 1  -AS    To walk in hospital room?  1  -BD 1  -AS    AM-PAC 6 Clicks Score  8  -BD 8  -AS    How much help from another is currently needed...    Putting on and taking off regular lower body clothing? 2  -AC      Bathing (including washing, rinsing, and  drying) 2  -AC      Toileting (which includes using toilet bed pan or urinal) 2  -AC      Putting on and taking off regular upper body clothing 2  -AC      Taking care of personal grooming (such as brushing teeth) 3  -AC      Eating meals 3  -AC      Score 14  -AC      Functional Assessment    Outcome Measure Options AM-PAC 6 Clicks Daily Activity (OT)  -AC AM-PAC 6 Clicks Basic Mobility (PT)  -BD AM-PAC 6 Clicks Basic Mobility (PT)  -AS      06/06/17 1110          How much help from another person do you currently need...    Turning from your back to your side while in flat bed without using bedrails? 2  -MB      Moving from lying on back to sitting on the side of a flat bed without bedrails? 2  -MB      Moving to and from a bed to a chair (including a wheelchair)? 1  -MB      Standing up from a chair using your arms (e.g., wheelchair, bedside chair)? 1  -MB      Climbing 3-5 steps with a railing? 1  -MB      To walk in hospital room? 1  -MB      AM-PAC 6 Clicks Score 8  -MB      Functional Assessment    Outcome Measure Options AM-PAC 6 Clicks Basic Mobility (PT)  -MB        User Key  (r) = Recorded By, (t) = Taken By, (c) = Cosigned By    Initials Name Provider Type    AC Lissette Ramos, OT Occupational Therapist    AS Nelly Fink, PTA Physical Therapy Assistant    BRYANNA Sterling, PT Physical Therapist    SANDHYA Shannon, PT Physical Therapist           Time Calculation:         PT Charges       06/08/17 1230          Time Calculation    Start Time 1025  -BD      PT Received On 06/08/17  -BD      PT Goal Re-Cert Due Date 06/16/17  -      Time Calculation- PT    Total Timed Code Minutes- PT 42 minute(s)  -        User Key  (r) = Recorded By, (t) = Taken By, (c) = Cosigned By    Initials Name Provider Type    SANDHYA Shannon, PT Physical Therapist          Therapy Charges for Today     Code Description Service Date Service Provider Modifiers Qty    21906764777 HC PT THER SUPP EA 15 MIN  6/8/2017 Lisa Shannon, PT GP 1    91341637597 HC PT THER PROC EA 15 MIN 6/8/2017 Lisa Shannon, PT GP 3          PT G-Codes  PT Professional Judgement Used?: Yes  Outcome Measure Options: AM-PAC 6 Clicks Daily Activity (OT)  Score: 8  Functional Limitation: Mobility: Walking and moving around  Mobility: Walking and Moving Around Current Status (): At least 80 percent but less than 100 percent impaired, limited or restricted  Mobility: Walking and Moving Around Goal Status (): At least 60 percent but less than 80 percent impaired, limited or restricted    Lisa Shannon, PT  6/8/2017          Electronically signed by Lisa Shannon PT at 6/8/2017 12:33 PM           Occupational Therapy Notes (last 24 hours) (Notes from 6/7/2017  2:09 PM through 6/8/2017  2:09 PM)      Lissette Ramos, OT at 6/8/2017 12:03 PM  Version 1 of 1         Problem: Patient Care Overview (Adult)  Goal: Plan of Care Review  Outcome: Ongoing (interventions implemented as appropriate)    06/08/17 1154   Coping/Psychosocial Response Interventions   Plan Of Care Reviewed With patient   Outcome Evaluation   Outcome Summary/Follow up Plan Pt presents with limitations in strength, blance, ADL performance, and mobility. Pt with UE tremoring and uses weighted utensils, issued thandle cup. Pt max x 2 STS and dependent transfer. Pr will benefit from OT to advance pt toward increased ind with ADLs and functiaonl mobiltiy. Recommend return to IP rehab upon d/c.          Problem: Inpatient Occupational Therapy  Goal: Bed Mobility Goal LTG- OT  Outcome: Ongoing (interventions implemented as appropriate)    06/08/17 1154   Bed Mobility OT LTG   Bed Mobility OT LTG, Date Established 06/08/17   Bed Mobility OT LTG, Time to Achieve by discharge   Bed Mobility OT LTG, Activity Type supine to sit/sit to supine   Bed Mobility OT LTG, [place] Level moderate assist (50% patient effort)   Bed Mobility OT LTG, Assist Device bed rails        Goal: Transfer Training Goal 1 LTG- OT  Outcome: Ongoing (interventions implemented as appropriate)    17 1154   Transfer Training OT LTG   Transfer Training OT LTG, Date Established 17   Transfer Training OT LTG, Time to Achieve by discharge   Transfer Training OT LTG, Activity Type bed to chair /chair to bed   Transfer Training OT LTG, [place] Level moderate assist (50% patient effort);2 person assist required   Transfer Training OT LTG, Assist Device other (see comments)  (appropriate AD)       Goal: Strength Goal LTG- OT  Outcome: Ongoing (interventions implemented as appropriate)    17 1154   Strength OT LTG   Strength Goal OT LTG, Date Established 17   Strength Goal OT LTG, Time to Achieve by discharge   Strength Goal OT LTG, Measure to Achieve Pt will complete 2 sets x 10 BUE AROM       Goal: Dynamic Sitting Balance Goal LTG- OT  Outcome: Ongoing (interventions implemented as appropriate)    17 1154   Dynamic Sitting Balance OT LTG   Dynamic Sitting Balance OT LTG, Date Established 17   Dynamic Sitting Balance OT LTG, Time to Achieve by discharge   Dynamic Sitting Balance OT LTG, [place] Level supervision required   Dynamic Sitting Balance OT LTG, Assist Device UE Support       Goal: Grooming Goal LTG- OT  Outcome: Ongoing (interventions implemented as appropriate)    17 1154   Grooming OT LTG   Grooming Goal OT LTG, Date Established 17   Grooming Goal OT LTG, Time to Achieve by discharge   Grooming Goal OT LTG, [place] Level set up required              Electronically signed by Lissette Ramos OT at 2017 12:03 PM      Lissette Ramos OT at 2017 12:07 PM  Version 1 of 1         Acute Care - Occupational Therapy Initial Evaluation  [facility]     Patient Name: Katherin Ta  : 1934  MRN: [id number]  Today's Date: 2017  Onset of Illness/Injury or Date of Surgery Date: 17  Date of Referral to OT: 17  Referring  Physician: MD Giuseppe    Admit Date: 6/5/2017       ICD-10-CM ICD-9-CM   1. Somnolence R40.0 780.09   2. Sepsis, due to unspecified organism A41.9 038.9     995.91   3. Impaired functional mobility, balance, gait, and endurance Z74.09 V49.89   4. Impaired mobility and ADLs Z74.09 799.89     Patient Active Problem List   Diagnosis   • Rheumatoid arthritis   • Spinal stenosis   • Hypertension   • Hypothyroid   • Hyperlipidemia   • GERD (gastroesophageal reflux disease)   • Intractable back pain   • Weakness   • Somnolence     Past Medical History:   Diagnosis Date   • GERD (gastroesophageal reflux disease) 5/11/2017   • Gout    • Hyperlipidemia 5/11/2017   • Hypertension 5/11/2017   • Hypothyroid 5/11/2017   • Rheumatoid arthritis    • Spinal stenosis      Past Surgical History:   Procedure Laterality Date   • AMPUTATION DIGIT Right     right middle toe   • BACK SURGERY     • EYE SURGERY     • HAND SURGERY     • JOINT REPLACEMENT      left knee   • PERICARDIAL WINDOW            OT ASSESSMENT FLOWSHEET (last 72 hours)      OT Evaluation       06/08/17 1029 06/07/17 1045 06/07/17 0730 06/07/17 0400 06/07/17 0000    Rehab Evaluation    Document Type evaluation  -AC therapy note (daily note)  -AS       Subjective Information agree to therapy;no complaints  -AC agree to therapy;complains of;weakness  -AS       Patient Effort, Rehab Treatment good  -AC good  -AS       General Information    Patient Profile Review yes  -AC        Onset of Illness/Injury or Date of Surgery Date 06/05/17  -AC        Referring Physician MD Giuseppe  -AC        General Observations Pt received supine in bed. Pt's 2 dtrs present in room.   -AC        Pertinent History Of Current Problem Pt admit from [facility] with R facila droop, RUE weakness, chronic R LE weakness and somnolence.    -AC        Precautions/Limitations fall precautions  -AC fall precautions  -AS       Prior Level of Function independent:;feeding;grooming;max assist:;dressing;bathing    stand pivot/sliding board t/f at [facility],ambualting 1 mo ago  -AC        Equipment Currently Used at Home walker, rolling   transport chair  -AC        Plans/Goals Discussed With patient and family;agreed upon  -AC        Risks Reviewed patient and family:;LOB  -AC        Benefits Reviewed patient and family:;improve function;increase independence;increase strength;increase balance;increase knowledge  -AC        Barriers to Rehab previous functional deficit  -AC        Living Environment    Lives With child(bev), adult   dtr  -AC        Living Arrangements house  -AC        Home Accessibility ramps present at home  -AC        Clinical Impression    Date of Referral to OT 06/07/17  -AC        OT Diagnosis Decreased independence with self care  -AC        Patient/Family Goals Statement Pt would like to increase independence with self care  -AC        Criteria for Skilled Therapeutic Interventions Met yes;treatment indicated  -AC        Rehab Potential good, to achieve stated therapy goals  -AC        Therapy Frequency daily  -AC        Anticipated Discharge Disposition inpatient rehabilitation facility  -AC        Pain Assessment    Pain Assessment No/denies pain  -AC No/denies pain  -AS       Vision Assessment/Intervention    Visual Impairment WNL  -AC        Cognitive Assessment/Intervention    Current Cognitive/Communication Assessment functional  -AC functional  -AS       Orientation Status oriented x 4  -AC oriented to;person;place  -AS       Follows Commands/Answers Questions 100% of the time;needs cueing  -% of the time;able to follow single-step instructions  -AS       Personal Safety WNL/WFL  -AC WNL/WFL  -AS       Personal Safety Interventions fall prevention program maintained;gait belt;nonskid shoes/slippers when out of bed  -AC fall prevention program maintained;gait belt;nonskid shoes/slippers when out of bed  -AS       ROM (Range of Motion)    General ROM no range of motion deficits identified  -AC         MMT (Manual Muscle Testing)    General MMT Assessment upper extremity strength deficits identified  -AC        General MMT Assessment Detail BUE grossly 4-/5  -AC        Bed Mobility, Assessment/Treatment    Bed Mobility, Assistive Device draw sheet  -AC draw sheet  -AS       Bed Mobility, Roll Left, [place] verbal cues required;maximum assist (25% patient effort)   rolled to place lift sling  -AC verbal cues required;maximum assist (25% patient effort);2 person assist required  -AS       Bed Mobility, Roll Right, [place] verbal cues required;moderate assist (50% patient effort)  -AC verbal cues required;maximum assist (25% patient effort);2 person assist required;other (see comments)   to position lift sling  -AS       Bed Mobility, [place]/Bridge, [place] verbal cues required;moderate assist (50% patient effort)  -AC        Bed Mob, Supine to Sit, [place] verbal cues required;maximum assist (25% patient effort);2 person assist required  -AC        Bed Mobility, Comment  patient rolled to place lift sling  -AS       Transfer Assessment/Treatment    Transfers, Bed-Chair [place] dependent (less than 25% patient effort)  -AC dependent (less than 25% patient effort);2 person assist required  -AS       Transfers, Bed-Chair-Bed, Assist Device mechanical lift  -AC mechanical lift  -AS       Transfers, Sit-Stand [place] verbal cues required;maximum assist (25% patient effort);2 person assist required  -AC        Transfers, Stand-Sit [place] verbal cues required;maximum assist (25% patient effort);2 person assist required  -AC        Transfer, Comment 2 STS with knees blocked  -AC        Motor Skills/Interventions    Additional Documentation Balance Skills Training (Group)  -AC        Balance Skills Training    Sitting-Level of Assistance Minimum assistance  -AC        Sitting-Balance Support Feet supported;Right upper extremity supported;Left upper extremity supported  -         Sitting-Balance Activities Trunk control activities  -AC        Standing-Level of Assistance Maximum assistance;x2  -AC        Static Standing Balance Support Right upper extremity supported;Left upper extremity supported   knees blocked  -AC        Therapy Exercises    Right Lower Extremity  PROM:;10 reps;supine;ankle pumps/circles;calf stretch;hip abduction/adduction;heel slides  -AS       Left Lower Extremity  AAROM:;10 reps;supine;ankle pumps/circles;calf stretch;hip abduction/adduction;heel slides  -AS       Sensory Assessment/Intervention    Light Touch   LLE;RLE  -BR LLE;RLE  -MC LLE;RLE  -MC    LLE Light Touch   mild impairment  -BR mild impairment  -MC mild impairment  -MC    RLE Light Touch   severe impairment  -BR severe impairment  -MC severe impairment  -MC    General Therapy Interventions    Planned Therapy Interventions ADL retraining;balance training;bed mobility training;strengthening;transfer training  -AC        Positioning and Restraints    Pre-Treatment Position in bed  -AC in bed  -AS       Post Treatment Position chair  -AC chair  -AS       In Chair reclined;call light within reach;encouraged to call for assist;exit alarm on;with family/caregiver  -AC reclined;call light within reach;encouraged to call for assist;with family/caregiver;on mechanical lift sling  -AS         06/06/17 2000 06/06/17 1110 06/06/17 1014 06/06/17 1013 06/05/17 1411    Rehab Evaluation    Document Type  evaluation  -MB       Subjective Information  agree to therapy;no complaints  -MB       Patient Effort, Rehab Treatment  good  -MB       Symptoms Noted During/After Treatment  none  -MB       General Information    Patient Profile Review  yes  -MB       Onset of Illness/Injury or Date of Surgery Date  06/05/17  -MB       Referring Physician  KATHY oRbles MD  -MB       General Observations  Pt. supine w/ IV L UE intact, telemetry, pulse ox, alexander, on RA.  Nsg consent for PT.  Pt's sister present at bedside.  -MB        Pertinent History Of Current Problem  Pt. adm via EMS from [facility] w/ R side facial droop, R UE weakness, chronic R LE weakness, and somnolence.  Pt. recently adm. to [facility] w/ intractable back pain; pt. not a candidate for spinal sx.  Pt. has h/o spinal stenosis and has morphine pump.  -MB       Precautions/Limitations  fall precautions   morphine pump  -MB       Prior Level of Function  independent:;bed mobility;max assist:;transfer   Pt. was ambulatory approx 1 month ago; SB transfers at [facility].  -MB       Equipment Currently Used at Home  walker, rolling   transport chair  -MB   walker, rolling;wheelchair  -ANGY    Plans/Goals Discussed With  patient and family;agreed upon  -MB       Risks Reviewed  patient and family:;LOB;nausea/vomiting;dizziness;increased discomfort;change in vital signs;lines disloged  -MB       Benefits Reviewed  patient and family:;improve function;increase independence;increase strength;increase balance;decrease pain;decrease risk of DVT;improve skin integrity;increase knowledge  -MB       Barriers to Rehab  previous functional deficit;medically complex  -MB       Living Environment    Lives With  child(bev), adult  -MB child(bev), adult  -SP  child(bev), adult  -ANGY    Living Arrangements  house  -MB house  -SP  house  -ANGY    Home Accessibility  stairs to enter home  -MB   stairs to enter home  -ANGY    Number of Stairs to Enter Home  2  -MB   2  -ANGY    Stair Railings at Home  outside, present on left side  -MB   outside, present on left side  -ANGY    Type of Financial/Environmental Concern     none  -ANGY    Transportation Available     car  -ANGY    Living Environment Comment  Prior to recent hospitalization at [facility] then to [facility], pt. lived w/ dtr and was ambulating household distances w/ RW.  Pt. non-ambulatory and performing sliding board transfers at [facility], PTA.  -MB Daughter lives with the pt. The pt came for [facility]. Prior to recent illness she  required assist with ADLs and used a r walker.  -SP       Functional Level Prior    Ambulation    3-->assistive equipment and person  -SP 3-->assistive equipment and person  -ANGY    Transferring    3-->assistive equipment and person  -SP 3-->assistive equipment and person  -ANGY    Toileting    2-->assistive person  -SP 3-->assistive equipment and person  -ANGY    Bathing    2-->assistive person  -SP 3-->assistive equipment and person  -ANGY    Dressing    2-->assistive person  -SP 3-->assistive equipment and person  -ANGY    Eating    0-->independent  -SP 2-->assistive person  -ANGY    Communication     0-->understands/communicates without difficulty  -ANGY    Swallowing     0-->swallows foods/liquids without difficulty  -ANGY    Prior Functional Level Comment     na   -ANGY    Vital Signs    Pre Systolic BP Rehab  146  -MB       Pre Treatment Diastolic BP  84  -MB       Pretreatment Heart Rate (beats/min)  88  -MB       Pre SpO2 (%)  95  -MB       O2 Delivery Pre Treatment  room air  -MB       Pre Patient Position  Supine  -MB       Intra Patient Position  Standing  -MB       Post Patient Position  Sitting  -MB       Pain Assessment    Pain Assessment  No/denies pain  -MB       Vision Assessment/Intervention    Visual Impairment  WFL with corrective lenses  -MB       Cognitive Assessment/Intervention    Current Cognitive/Communication Assessment  functional  -MB       Orientation Status  oriented x 4  -MB       Follows Commands/Answers Questions  100% of the time;able to follow single-step instructions;needs cueing;needs increased time  -MB       Personal Safety  WNL/WFL  -MB       Personal Safety Interventions  fall prevention program maintained;gait belt;muscle strengthening facilitated;nonskid shoes/slippers when out of bed  -MB       ROM (Range of Motion)    General ROM  no range of motion deficits identified  -MB       MMT (Manual Muscle Testing)    General MMT Assessment  lower extremity strength deficits identified  -MB       General MMT Assessment Detail  L LE grossly 4-/5.  R LE  1/5.   defer UEs to OT.  -MB       Bed Mobility, Assessment/Treatment    Bed Mobility, Assistive Device  bed rails;draw sheet  -MB       Bed Mobility, Roll Left, [place]  maximum assist (25% patient effort);verbal cues required  -MB       Bed Mobility, Roll Right, [place]  maximum assist (25% patient effort);verbal cues required  -MB       Bed Mob, Supine to Sit, [place]  maximum assist (25% patient effort);2 person assist required;verbal cues required  -MB       Bed Mob, Sit to Supine, [place]  not tested  -MB       Bed Mobility, Safety Issues  decreased use of legs for bridging/pushing;decreased use of arms for pushing/pulling  -MB       Bed Mobility, Impairments  sensation decreased;strength decreased;impaired balance;coordination impaired  -MB       Bed Mobility, Comment  Pt. rolled L/R for hygiene.  Pt. incontinent of bowel requiring dep A for hygiene.  VCs for sequencing and assist at hips to facilitate.   -MB       Transfer Assessment/Treatment    Transfers, Bed-Chair [place]  dependent (less than 25% patient effort);2 person assist required;verbal cues required  -MB       Transfers, Chair-Bed [place]  not tested  -MB       Transfers, Bed-Chair-Bed, Assist Device  --   gait belt, B UE support  -MB       Transfer, Safety Issues  balance decreased during turns;sequencing ability decreased;step length decreased;weight-shifting ability decreased;knees buckling  -MB       Transfer, Impairments  sensation decreased;strength decreased;impaired balance;motor control impaired  -MB       Transfer, Comment  Pt. transferred bed -> chair via stand-pivot w/ dep A x 2. Pt. unable to stand upright, shift weight, and advance LEs.  Recommended to [facility] that patient be moved to lift room.    -MB       Motor Skills/Interventions    Additional Documentation  Balance Skills Training (Group)  -MB       Balance Skills Training    Sitting-Level of Assistance  Minimum assistance  -MB       Sitting-Balance  Support  Feet supported;Right upper extremity supported;Left upper extremity supported  -MB       Sitting-Balance Activities  Trunk control activities;Forward lean;Lateral lean  -MB       Sitting # of Minutes  10  -MB       Standing-Level of Assistance  Dependent;x2  -MB       Static Standing Balance Support  Right upper extremity supported;Left upper extremity supported   gait belt  -MB       Sensory Assessment/Intervention    Light Touch LLE;RLE  -MC LLE;RLE  -MB       LLE Light Touch mild impairment  -MC mild impairment  -MB       RLE Light Touch severe impairment  - severe impairment  -MB       Positioning and Restraints    Pre-Treatment Position  in bed  -MB       Post Treatment Position  chair  -MB       In Chair  notified nsg;reclined;call light within reach;encouraged to call for assist;exit alarm on;with family/caregiver;[place] cushion;on mechanical lift sling;legs elevated;heels elevated  -MB         User Key  (r) = Recorded By, (t) = Taken By, (c) = Cosigned By    Initials Name Effective Dates    AC Lissette Ramos, OT 06/23/15 -     AS Nelly Fink, PUJA 06/22/15 -     BRYANNA Sterling, PT 03/14/16 -     SP Corinne Vazquez, RN 03/14/16 -     ANGY Diehl, RN 06/16/16 -     JASMINE Castaneda, WILNER 06/16/16 -     LUZ MARIA Prado, RN 08/22/16 -            Occupational Therapy Education     Title: PT OT SLP Therapies (Active)     Topic: Occupational Therapy (Active)     Point: ADL training (Done)    Description: Instruct learner(s) on proper safety adaptation and remediation techniques during self care or transfers.   Instruct in proper use of assistive devices.    Learning Progress Summary    Learner Readiness Method Response Comment Documented by Status   Patient Acceptance E,D,H VU Role of OT, isssued UE AROM HEP  06/08/17 1153 Done   Family Acceptance E,D,H VU Role of OT, isssued UE AROM HEP  06/08/17 1153 Done               Point: Home exercise program (Done)    Description: Instruct  learner(s) on appropriate technique for monitoring, assisting and/or progressing therapeutic exercises/activities.    Learning Progress Summary    Learner Readiness Method Response Comment Documented by Status   Patient Acceptance E,D,H VU Role of OT, isssued UE AROM HEP  06/08/17 1153 Done   Family Acceptance E,D,H VU Role of OT, isssued UE AROM HEP  06/08/17 1153 Done                      User Key     Initials Effective Dates Name Provider Type Discipline     06/23/15 -  Lissette Ramos OT Occupational Therapist OT                  OT Recommendation and Plan  Anticipated Discharge Disposition: inpatient rehabilitation facility  Planned Therapy Interventions: ADL retraining, balance training, bed mobility training, strengthening, transfer training  Therapy Frequency: daily  Plan of Care Review  Plan Of Care Reviewed With: patient          OT Goals       06/08/17 1154          Bed Mobility OT LTG    Bed Mobility OT LTG, Date Established 06/08/17  -      Bed Mobility OT LTG, Time to Achieve by discharge  -AC      Bed Mobility OT LTG, Activity Type supine to sit/sit to supine  -AC      Bed Mobility OT LTG, [place] Level moderate assist (50% patient effort)  -AC      Bed Mobility OT LTG, Assist Device bed rails  -AC      Transfer Training OT LTG    Transfer Training OT LTG, Date Established 06/08/17  -AC      Transfer Training OT LTG, Time to Achieve by discharge  -AC      Transfer Training OT LTG, Activity Type bed to chair /chair to bed  -AC      Transfer Training OT LTG, [place] Level moderate assist (50% patient effort);2 person assist required  -AC      Transfer Training OT LTG, Assist Device other (see comments)   appropriate AD  -AC      Strength OT LTG    Strength Goal OT LTG, Date Established 06/08/17  -AC      Strength Goal OT LTG, Time to Achieve by discharge  -AC      Strength Goal OT LTG, Measure to Achieve Pt will complete 2 sets x 10 BUE AROM  -AC      Dynamic Sitting Balance OT LTG     Dynamic Sitting Balance OT LTG, Date Established 06/08/17  -AC      Dynamic Sitting Balance OT LTG, Time to Achieve by discharge  -AC      Dynamic Sitting Balance OT LTG, [place] Level supervision required  -AC      Dynamic Sitting Balance OT LTG, Assist Device UE Support  -AC      Grooming OT LTG    Grooming Goal OT LTG, Date Established 06/08/17  -AC      Grooming Goal OT LTG, Time to Achieve by discharge  -AC      Grooming Goal OT LTG, [place] Level set up required  -AC        User Key  (r) = Recorded By, (t) = Taken By, (c) = Cosigned By    Initials Name Provider Type    CHRISTINA Ramos OT Occupational Therapist                Outcome Measures       06/08/17 1029 06/07/17 1045 06/06/17 1110    How much help from another person do you currently need...    Turning from your back to your side while in flat bed without using bedrails?  2  -AS 2  -MB    Moving from lying on back to sitting on the side of a flat bed without bedrails?  2  -AS 2  -MB    Moving to and from a bed to a chair (including a wheelchair)?  1  -AS 1  -MB    Standing up from a chair using your arms (e.g., wheelchair, bedside chair)?  1  -AS 1  -MB    Climbing 3-5 steps with a railing?  1  -AS 1  -MB    To walk in hospital room?  1  -AS 1  -MB    AM-PAC 6 Clicks Score  8  -AS 8  -MB    How much help from another is currently needed...    Putting on and taking off regular lower body clothing? 2  -AC      Bathing (including washing, rinsing, and drying) 2  -AC      Toileting (which includes using toilet bed pan or urinal) 2  -AC      Putting on and taking off regular upper body clothing 2  -AC      Taking care of personal grooming (such as brushing teeth) 3  -AC      Eating meals 3  -AC      Score 14  -AC      Functional Assessment    Outcome Measure Options AM-PAC 6 Clicks Daily Activity (OT)  -AC AM-PAC 6 Clicks Basic Mobility (PT)  -AS AM-PAC 6 Clicks Basic Mobility (PT)  -MB      User Key  (r) = Recorded By, (t) = Taken By, (c) =  Cosigned By    Initials Name Provider Type    AC Lissette Ramos, OT Occupational Therapist    AS Nelly Fink PTA Physical Therapy Assistant    BRYANNA Sterling, PT Physical Therapist          Time Calculation:   OT Start Time: 1029    Therapy Charges for Today     Code Description Service Date Service Provider Modifiers Qty    71593364842  OT EVAL HIGH COMPLEXITY 4 6/8/2017 Lissette Ramos OT GO 1    [id number]  OT THERAPEUTIC ACT EA 15 MIN 6/8/2017 Lissette Ramos OT GO 1    [id number]  OT SELFCARE CURRENT 6/8/2017 Lissette Ramos OT GO, CK 1    [id number]  OT SELFCARE PROJECTED 6/8/2017 Lissette Ramos OT GO, CJ 1          OT G-codes  Functional Assessment Tool Used: Impact 6 clicks  Score: 14  Functional Limitation: Self care  Self Care Current Status (): At least 40 percent but less than 60 percent impaired, limited or restricted  Self Care Goal Status (): At least 20 percent but less than 40 percent impaired, limited or restricted    Lissette Ramos OT  6/8/2017     Electronically signed by Lissette Ramos OT at 6/8/2017 12:07 PM        Speech Language Pathology Notes (last 24 hours) (Notes from 6/7/2017  2:09 PM through 6/8/2017  2:09 PM)     No notes of this type exist for this encounter.

## 2017-06-08 NOTE — PROGRESS NOTES
Continued Stay Note  [facility]     Patient Name: Katherin Ta  MRN: [id number]  Today's Date: 6/8/2017    Admit Date: 6/5/2017          Discharge Plan       06/08/17 1529    Case Management/Social Work Plan    Plan SNF    Patient/Family In Agreement With Plan yes    Additional Comments [facility] and [facility] have no beds. I spoke with the pt and her sister. [facility] a bed and has started the precert. [facility] has also offered but the pt/family prefer [facility]. She may be in her co-insurance days. The pt is aware. I have an ambulance for 1600 on 6-9-17 if ready and PC obtained.              Discharge Codes     None        Expected Discharge Date and Time     Expected Discharge Date Expected Discharge Time    Jun 11, 2017             Corinne Vazquez RN

## 2017-06-08 NOTE — THERAPY TREATMENT NOTE
Acute Care - Physical Therapy Treatment Note  [facility]     Patient Name: Katherin Ta  : 1934  MRN: [id number]  Today's Date: 2017  Onset of Illness/Injury or Date of Surgery Date: 17  Date of Referral to PT: 17  Referring Physician: MD Giuseppe    Admit Date: 2017    Visit Dx:    ICD-10-CM ICD-9-CM   1. Somnolence R40.0 780.09   2. Sepsis, due to unspecified organism A41.9 038.9     995.91   3. Impaired functional mobility, balance, gait, and endurance Z74.09 V49.89   4. Impaired mobility and ADLs Z74.09 799.89     Patient Active Problem List   Diagnosis   • Rheumatoid arthritis   • Spinal stenosis   • Hypertension   • Hypothyroid   • Hyperlipidemia   • GERD (gastroesophageal reflux disease)   • Intractable back pain   • Weakness   • Somnolence               Adult Rehabilitation Note       17 1025 17 1045       Rehab Assessment/Intervention    Discipline physical therapist  -BD physical therapy assistant  -AS     Document Type therapy note (daily note)  -BD therapy note (daily note)  -AS     Subjective Information agree to therapy;complains of;weakness  -BD agree to therapy;complains of;weakness  -AS     Patient Effort, Rehab Treatment good  -BD good  -AS     Symptoms Noted During/After Treatment fatigue  -BD      Precautions/Limitations fall precautions;oxygen therapy device and L/min  -BD fall precautions  -AS     Precautions/Limitations, Vision WFL with corrective lenses  -BD      Recorded by [BD] Lisa Shannon, PT [AS] Nelly Fink PTA     Pain Assessment    Pain Assessment No/denies pain  -BD No/denies pain  -AS     Recorded by [BD] Lisa Shannon, PT [AS] Nelly Fink PTA     Vision Assessment/Intervention    Visual Impairment WNL  -BD      Recorded by [BD] Lisa Shannon, RENE      Cognitive Assessment/Intervention    Current Cognitive/Communication Assessment functional  -BD functional  -AS     Orientation Status oriented x 4  -BD oriented  to;person;place  -AS     Follows Commands/Answers Questions 100% of the time;able to follow single-step instructions;needs cueing  -% of the time;able to follow single-step instructions  -AS     Personal Safety WNL/WFL  -BD WNL/WFL  -AS     Personal Safety Interventions fall prevention program maintained;gait belt;nonskid shoes/slippers when out of bed;supervised activity  -BD fall prevention program maintained;gait belt;nonskid shoes/slippers when out of bed  -AS     Recorded by [BD] Lisa Shannon, PT [AS] Nelly iFnk PTA     Bed Mobility, Assessment/Treatment    Bed Mobility, Assistive Device draw sheet  -BD draw sheet  -AS     Bed Mobility, Roll Left, [place] verbal cues required;maximum assist (25% patient effort)  -BD verbal cues required;maximum assist (25% patient effort);2 person assist required  -AS     Bed Mobility, Roll Right, [place] verbal cues required;maximum assist (25% patient effort)  -BD verbal cues required;maximum assist (25% patient effort);2 person assist required;other (see comments)   to position lift sling  -AS     Bed Mobility, Comment Pt log rolled for  lift placement.  -BD patient rolled to place lift sling  -AS     Recorded by [BD] Lisa Shannon, PT [AS] Nelly Fink, PUJA     Transfer Assessment/Treatment    Transfers, Bed-Chair [place] dependent (less than 25% patient effort);unable to perform;other (see comments)   Sling used to transfer pt bed to chair.  -BD dependent (less than 25% patient effort);2 person assist required  -AS     Transfers, Bed-Chair-Bed, Assist Device  mechanical lift  -AS     Transfers, Sit-Stand [place] maximum assist (25% patient effort);2 person assist required  -BD      Transfers, Stand-Sit [place] verbal cues required;maximum assist (25% patient effort);2 person assist required;other (see comments)   knees buckling (L) and no assist R leg.  -BD      Transfer, Comment 3 x max assist sit to stand at  bedside.  -BD      Recorded by [BD] Lisa Shannon PT [AS] Nelly Fink PTA     Gait Assessment/Treatment    Gait, [place] Level not appropriate to assess  -BD not appropriate to assess  -AS     Recorded by [BD] Lisa Shannon PT [AS] Nelly Fink PTA     Stairs Assessment/Treatment    Stairs, Impairments unable to maintain weight bearing status  -BD      Recorded by [BD] Lisa Shannon PT      Motor Skills/Interventions    Additional Documentation Balance Skills Training (Group)  -BD      Recorded by [BD] Lisa Shannon PT      Balance Skills Training    Training Strategies (Balance Skills) sitting bal in bed and Max assist standing with support at side of bed.  -BD      Sitting-Level of Assistance Close supervision;Minimum assistance  -BD      Sitting-Balance Support Feet supported;Right upper extremity supported  -BD      Sitting-Balance Activities Trunk control activities  -BD      Standing-Level of Assistance Maximum assistance;Dependent  -BD      Static Standing Balance Support Right upper extremity supported;Left upper extremity supported  -BD      Standing-Balance Activities Weight Shift R-L  -BD      Standing Balance # of Minutes 1   total of 3 assists to stand from sitting on EOB  -BD      Gait Balance-Level of Assistance --   Gait not possible  -BD      Recorded by [BD] Lisa Shannon PT      Therapy Exercises    Right Lower Extremity  PROM:;10 reps;supine;ankle pumps/circles;calf stretch;hip abduction/adduction;heel slides  -AS     Left Lower Extremity  AAROM:;10 reps;supine;ankle pumps/circles;calf stretch;hip abduction/adduction;heel slides  -AS     Bilateral Lower Extremities AROM:;supine;ankle pumps/circles;hip IR;hip flexion;knee flexion  -BD      Recorded by [BD] Lisa Shannon PT [AS] Nelly Fink PTA     Positioning and Restraints    Pre-Treatment Position in bed  -BD in bed  -AS     Post Treatment Position chair  -BD chair  -AS     In Chair  reclined;call light within reach;encouraged to call for assist;exit alarm on;with family/caregiver;on mechanical lift sling;legs elevated;heels elevated  -BD reclined;call light within reach;encouraged to call for assist;with family/caregiver;on mechanical lift sling  -AS     Recorded by [BD] Lisa Shannon, PT [AS] Nelly Fink, PTA       User Key  (r) = Recorded By, (t) = Taken By, (c) = Cosigned By    Initials Name Effective Dates    AS Nelly Fink, PTA 06/22/15 -     BD Lisa Shannon, PT 06/13/16 -                 IP PT Goals       06/08/17 1223 06/07/17 1109 06/06/17 1350    Bed Mobility PT LTG    Bed Mobility PT LTG, Date Established   06/06/17  -MB    Bed Mobility PT LTG, Time to Achieve   1 wk  -MB    Bed Mobility PT LTG, Activity Type   supine to sit/sit to supine;roll left/roll right  -MB    Bed Mobility PT LTG, [place] Level   moderate assist (50% patient effort);2 person assist required  -MB    Bed Mobility PT LTG, Date Goal Reviewed  06/07/17  -AS     Bed Mobility PT LTG, Outcome goal ongoing  -BD goal ongoing  -AS goal ongoing  -MB    Transfer Training PT LTG    Transfer Training PT LTG, Date Established   06/06/17  -MB    Transfer Training PT LTG, Time to Achieve   1 wk  -MB    Transfer Training PT LTG, Activity Type   bed to chair /chair to bed;sit to stand/stand to sit  -MB    Transfer Training PT LTG, [place] Level   maximum assist (25% patient effort);2 person assist required  -MB    Transfer Training PT LTG, Assist Device   walker, rolling   ARJO  -MB    Transfer Training PT  LTG, Date Goal Reviewed  06/07/17  -AS     Transfer Training PT LTG, Outcome goal ongoing  -BD goal ongoing  -AS goal ongoing  -MB    Gait Training PT LTG    Gait Training Goal PT LTG, Date Established   06/06/17  -MB    Gait Training Goal PT LTG, Time to Achieve   1 wk  -MB    Gait Training Goal PT LTG, [place] Level   maximum assist (25% patient effort);2 person assist required  -MB     Gait Training Goal PT LTG, Assist Device   --   ARJO  -MB    Gait Training Goal PT LTG, Distance to Achieve   5  -MB    Gait Training Goal PT LTG, Date Goal Reviewed  06/07/17  -AS     Gait Training Goal PT LTG, Outcome goal ongoing  -BD goal ongoing  -AS goal ongoing  -MB    Static Standing Balance PT LTG    Static Standing Balance PT LTG, Date Established   06/06/17  -MB    Static Standing Balance PT LTG, Time to Achieve   1 wk  -MB    Static Standing Balance PT LTG, [place] Level   maximum assist (25% patient effort);2 person assist required  -MB    Static Standing Balance PT LTG, Assist Device   assistive Device   ARJO  -MB    Static Standing Balance PT LTG, Date Goal Reviewed  06/07/17  -AS     Static Standing Balance PT LTG, Outcome goal ongoing  -BD goal ongoing  -AS goal ongoing  -MB      User Key  (r) = Recorded By, (t) = Taken By, (c) = Cosigned By    Initials Name Provider Type    AS Nelly Fink, PTA Physical Therapy Assistant    BRYANNA Sterling, PT Physical Therapist    SANDHYA Shannon, PT Physical Therapist          Physical Therapy Education     Title: PT OT SLP Therapies (Active)     Topic: Physical Therapy (Active)     Point: Mobility training (Active)    Learning Progress Summary    Learner Readiness Method Response Comment Documented by Status   Patient Acceptance E,D NR   06/08/17 1221 Active    Acceptance E,D NR   06/08/17 1220 Active    Acceptance E NR  AS 06/07/17 1109 Active    Acceptance E,TB VU,KARLA   06/07/17 0558 Done    Acceptance E,D NR D/C planning, home safety, fall precautions, POC progression MB 06/06/17 1349 Active   Family Acceptance E,D NR   06/08/17 1221 Active    Acceptance E,D NR   06/08/17 1220 Active    Acceptance E NR  AS 06/07/17 1109 Active    Acceptance E,D NR D/C planning, home safety, fall precautions, POC progression MB 06/06/17 1349 Active               Point: Home exercise program (Active)    Learning Progress Summary    Learner  Readiness Method Response Comment Documented by Status   Patient Acceptance E,D NR   06/08/17 1221 Active    Acceptance E,D NR  BD 06/08/17 1220 Active    Acceptance E NR  AS 06/07/17 1109 Active    Acceptance E,TB TOMMYKARLA   06/07/17 0558 Done    Acceptance E,D NR D/C planning, home safety, fall precautions, POC progression MB 06/06/17 1349 Active   Family Acceptance E,D NR   06/08/17 1221 Active    Acceptance E,D NR   06/08/17 1220 Active    Acceptance E NR  AS 06/07/17 1109 Active    Acceptance E,D NR D/C planning, home safety, fall precautions, POC progression MB 06/06/17 1349 Active               Point: Body mechanics (Active)    Learning Progress Summary    Learner Readiness Method Response Comment Documented by Status   Patient Acceptance E,D NR   06/08/17 1221 Active    Acceptance E,D NR   06/08/17 1220 Active    Acceptance E NR  AS 06/07/17 1109 Active    [facility] 06/07[facility]17 0558 Done    Acceptance E,D NR D/C planning, home safety, fall precautions, POC progression MB 06/06/17 1349 Active   Family Acceptance E,D NR   06/08/17 1221 Active    Acceptance E,D NR   06/08/17 1220 Active    Acceptance E NR  AS 06/07/17 1109 Active    Acceptance E,D NR D/C planning, home safety, fall precautions, POC progression MB 06/06/17 1349 Active               Point: Precautions (Active)    Learning Progress Summary    Learner Readiness Method Response Comment Documented by Status   Patient Acceptance E,D NR   06/08/17 1221 Active    Acceptance E,D NR   06/08/17 1220 Active    Acceptance E NR  AS 06/07/17 1109 Active    [facility] 06/07/17 0558 Done    Acceptance E,D NR D/C planning, home safety, fall precautions, POC progression MB 06/06/17 1349 Active   Family Acceptance E,D NR   06/08/17 1221 Active    Acceptance E,D NR   06/08/17 1220 Active    Acceptance E NR  AS 06/07/17 1109 Active    Acceptance E,D NR D/C planning, home safety, fall precautions, POC progression MB  06/06/17 1349 Active                      User Key     Initials Effective Dates Name Provider Type Discipline    AS 06/22/15 -  Nelly Fink, PTA Physical Therapy Assistant PT    MB 03/14/16 -  Vero Sterling, PT Physical Therapist PT    BD 06/13/16 -  Lisa Shannon, PT Physical Therapist PT     08/22/16 -  Carole Prado, RN Registered Nurse Nurse                    PT Recommendation and Plan  Anticipated Equipment Needs At Discharge: bedside commode, tub bench, wheelchair (If D/C to home.  N/A if D/C to SNF.)  Anticipated Discharge Disposition: skilled nursing facility  Planned Therapy Interventions: balance training, bed mobility training, gait training, home exercise program, patient/family education, strengthening, transfer training  PT Frequency: daily  Plan of Care Review  Plan Of Care Reviewed With: patient, family  Outcome Summary/Follow up Plan: Pt unable to achieve standing transfer or gait  today. R leg disabled, L leg buckling. Sling used for transfer to chair          Outcome Measures       06/08/17 1029 06/08/17 1025 06/07/17 1045    How much help from another person do you currently need...    Turning from your back to your side while in flat bed without using bedrails?  2  -BD 2  -AS    Moving from lying on back to sitting on the side of a flat bed without bedrails?  2  -BD 2  -AS    Moving to and from a bed to a chair (including a wheelchair)?  1  -BD 1  -AS    Standing up from a chair using your arms (e.g., wheelchair, bedside chair)?  1  -BD 1  -AS    Climbing 3-5 steps with a railing?  1  -BD 1  -AS    To walk in hospital room?  1  -BD 1  -AS    AM-PAC 6 Clicks Score  8  -BD 8  -AS    How much help from another is currently needed...    Putting on and taking off regular lower body clothing? 2  -AC      Bathing (including washing, rinsing, and drying) 2  -AC      Toileting (which includes using toilet bed pan or urinal) 2  -AC      Putting on and taking off regular upper body  clothing 2  -AC      Taking care of personal grooming (such as brushing teeth) 3  -AC      Eating meals 3  -AC      Score 14  -AC      Functional Assessment    Outcome Measure Options AM-PAC 6 Clicks Daily Activity (OT)  -AC AM-PAC 6 Clicks Basic Mobility (PT)  -BD AM-PAC 6 Clicks Basic Mobility (PT)  -AS      06/06/17 1110          How much help from another person do you currently need...    Turning from your back to your side while in flat bed without using bedrails? 2  -MB      Moving from lying on back to sitting on the side of a flat bed without bedrails? 2  -MB      Moving to and from a bed to a chair (including a wheelchair)? 1  -MB      Standing up from a chair using your arms (e.g., wheelchair, bedside chair)? 1  -MB      Climbing 3-5 steps with a railing? 1  -MB      To walk in hospital room? 1  -MB      AM-PAC 6 Clicks Score 8  -MB      Functional Assessment    Outcome Measure Options AM-PAC 6 Clicks Basic Mobility (PT)  -MB        User Key  (r) = Recorded By, (t) = Taken By, (c) = Cosigned By    Initials Name Provider Type    AC Lissette Ramos, OT Occupational Therapist    AS Nelly Fink, PTA Physical Therapy Assistant    BRYANNA Sterling, PT Physical Therapist    BD Lisa Shannon, PT Physical Therapist           Time Calculation:         PT Charges       06/08/17 1230          Time Calculation    Start Time 1025  -BD      PT Received On 06/08/17  -BD      PT Goal Re-Cert Due Date 06/16/17  -      Time Calculation- PT    Total Timed Code Minutes- PT 42 minute(s)  -        User Key  (r) = Recorded By, (t) = Taken By, (c) = Cosigned By    Initials Name Provider Type    BD Lisa Shannon, PT Physical Therapist          Therapy Charges for Today     Code Description Service Date Service Provider Modifiers Qty    98979481904 HC PT THER SUPP EA 15 MIN 6/8/2017 Lisa Shannon, PT GP 1    [id number] HC PT THER PROC EA 15 MIN 6/8/2017 Lisa Shannon PT GP 3          PT  G-Codes  PT Professional Judgement Used?: Yes  Outcome Measure Options: AM-PAC 6 Clicks Daily Activity (OT)  Score: 8  Functional Limitation: Mobility: Walking and moving around  Mobility: Walking and Moving Around Current Status (): At least 80 percent but less than 100 percent impaired, limited or restricted  Mobility: Walking and Moving Around Goal Status (): At least 60 percent but less than 80 percent impaired, limited or restricted    Lisa Shannon, PT  6/8/2017

## 2017-06-08 NOTE — PLAN OF CARE
Problem: Patient Care Overview (Adult)  Goal: Plan of Care Review  Outcome: Ongoing (interventions implemented as appropriate)    06/08/17 1223   Coping/Psychosocial Response Interventions   Plan Of Care Reviewed With patient;family   Outcome Evaluation   Outcome Summary/Follow up Plan Pt unable to achieve standing transfer or gait today. R leg disabled, L leg buckling. Sling used for transfer to chair         Problem: Inpatient Physical Therapy  Goal: Bed Mobility Goal LTG- PT  Outcome: Ongoing (interventions implemented as appropriate)    06/08/17 1223   Bed Mobility PT LTG   Bed Mobility PT LTG, Outcome goal ongoing       Goal: Transfer Training Goal 1 LTG- PT  Outcome: Ongoing (interventions implemented as appropriate)    06/08/17 1223   Transfer Training PT LTG   Transfer Training PT LTG, Outcome goal ongoing       Goal: Gait Training Goal LTG- PT  Outcome: Ongoing (interventions implemented as appropriate)    06/08/17 1223   Gait Training PT LTG   Gait Training Goal PT LTG, Outcome goal ongoing       Goal: Static Standing Balance Goal LTG- PT  Outcome: Ongoing (interventions implemented as appropriate)    06/08/17 1223   Static Standing Balance PT LTG   Static Standing Balance PT LTG, Outcome goal ongoing

## 2017-06-08 NOTE — PLAN OF CARE
Problem: Patient Care Overview (Adult)  Goal: Plan of Care Review  Outcome: Ongoing (interventions implemented as appropriate)  Goal: Adult Individualization and Mutuality  Outcome: Ongoing (interventions implemented as appropriate)  Goal: Discharge Needs Assessment  Outcome: Ongoing (interventions implemented as appropriate)    Problem: Pain, Acute (Adult)  Goal: Identify Related Risk Factors and Signs and Symptoms  Outcome: Outcome(s) achieved Date Met:  06/08/17  Goal: Acceptable Pain Control/Comfort Level  Outcome: Ongoing (interventions implemented as appropriate)    Problem: Confusion, Acute (Adult)  Goal: Identify Related Risk Factors and Signs and Symptoms  Outcome: Outcome(s) achieved Date Met:  06/08/17  Goal: Cognitive/Functional Impairments Minimized  Outcome: Ongoing (interventions implemented as appropriate)  Goal: Safety  Outcome: Ongoing (interventions implemented as appropriate)    Problem: Skin Integrity Impairment, Risk/Actual (Adult)  Goal: Identify Related Risk Factors and Signs and Symptoms  Outcome: Outcome(s) achieved Date Met:  06/08/17  Goal: Skin Integrity/Wound Healing  Outcome: Ongoing (interventions implemented as appropriate)    Problem: Pressure Ulcer Risk (Dg Scale) (Adult,Obstetrics,Pediatric)  Goal: Identify Related Risk Factors and Signs and Symptoms  Outcome: Outcome(s) achieved Date Met:  06/08/17  Goal: Skin Integrity  Outcome: Ongoing (interventions implemented as appropriate)    Problem: Pressure Ulcer (Adult)  Goal: Signs and Symptoms of Listed Potential Problems Will be Absent or Manageable (Pressure Ulcer)  Outcome: Ongoing (interventions implemented as appropriate)

## 2017-06-08 NOTE — PROGRESS NOTES
"[facility] Progress Note    Date of Admission: 2017      Antibiotics:  Augmentin    CC:   Chief Complaint   Patient presents with   • Altered Mental Status       S: No new changes. Ongoing weakness. Difficulty feeding herself. Some tremor/    O:  /65  Pulse 73  Temp 98.5 °F (36.9 °C) (Oral)   Resp 16  Ht 62\" (157.5 cm)  Wt 160 lb (72.6 kg)  SpO2 93%  BMI 29.26 kg/m2  Temp (24hrs), Av.5 °F (36.9 °C), Min:98.5 °F (36.9 °C), Max:98.5 °F (36.9 °C)    GENERAL: Awake and alert, in no acute distress. Eating breakfast. NAD. Good historian   HEENT: Normocephalic, atraumatic. PERRL. EOMI. No conjunctival injection. No icterus. Oropharynx clear without evidence of thrush or exudate. No evidence of peridontal disease. Dry MM   NECK: Supple without nuchal rigidity  LYMPH: No cervical, axillary or inguinal lymphadenopathy. No neck masses  HEART: RRR; No murmur, rubs, gallops.   LUNGS: Clear to auscultation bilaterally without wheezing, rales, rhonchi. Normal respiratory effort. Use of Supplemental O2 at 2 L   ABDOMEN: Soft, nontender, nondistended. Positive bowel sounds. No rebound or guarding. Pain pump pocket palpable and nontender  EXT: No joint effusions. Limited mobility. Difficulty walking  : Normal appearing genitalia with Gilbert catheter and clear urine.   MSK: Essential tremor upper extremities. No joint effusions. Nonambulatory.   SKIN: Warm and dry without cutaneous eruptions.   NEURO: Oriented to PPT. No focal deficits.   PSYCHIATRIC: Normal insight and judgement. Cooperative with PE    Laboratory Data      Results from last 7 days  Lab Units 17  0520 17  0507 17  1012   WBC 10*3/mm3 7.96 12.97* 17.66*   HEMOGLOBIN g/dL 10.8* 11.2* 12.5   HEMATOCRIT % 33.6* 35.2 38.6   PLATELETS 10*3/mm3 147* 155 162       Results from last 7 days  Lab Units 17  0520   SODIUM mmol/L 143   POTASSIUM mmol/L 3.7   CHLORIDE mmol/L 111*   TOTAL CO2 mmol/L 30.0   BUN mg/dL 12   CREATININE mg/dL 0.60 "   GLUCOSE mg/dL 93   CALCIUM mg/dL 9.8       Results from last 7 days  Lab Units 06/05/17  1012   ALK PHOS U/L 107*   BILIRUBIN mg/dL 0.7   ALT (SGPT) U/L 31   AST (SGOT) U/L 24       Results from last 7 days  Lab Units 06/05/17  1012   SED RATE mm/hr 26       Results from last 7 days  Lab Units 06/08/17  0522   CRP mg/dL 6.16*   Improved    Estimated Creatinine Clearance: 50.6 mL/min (by C-G formula based on Cr of 0.6).      Microbiology:  Blood culture: NGSF x 2    Radiology:  Imaging Results (last 24 hours)     ** No results found for the last 24 hours. **        PROBLEM LIST:   Ascending Colitis  Leukocytosis  Chronic constipation  Descending colon diverticula  R arm and facial drooping- MRI negative for CVA  Symptoms resolved  Severe spinal stenosis with degenerative disc and collapse  Chronic back pain with indwelling morphine pain pump   HTN, HLP, hypothyroidism      ASSESSMENT:  Patient is an 82-year-old female with a history of chronic back pain with severe spinal stenosis and degenerative disc disease with vertebral collapse noted on recent MRI of the lumbar spine. She is a nonsurgical candidate with a chronic indwelling pain pump. She is admitted from [facility] secondary to lethargy, confusion, right arm weakness and right-sided facial droop. MRI performed at admission negative for acute CVA. She was found to have leukocytosis and imaging of the abdomen, pelvis, chest showed descending colitis with coli on it diverticula noted. No free fluid or evidence of abscess or perforation. She is on vancomycin and Zosyn at this time with infectious disease consultation for treatment recommendations.  She does have a history of chronic constipation. The cultures collected and negative. She is responded well to suppository use. At this time, would de-escalate antibiotics to Augmentin to cover for mild colitis as white blood cell count is now normal with no other clear source of infection  other than ascending colitis on CT scan      PLAN:  Augmentin 875 PO BID x 7 days in chart  Plan for PO therapy at discharge. Seeking placement options  Probiotic x 1 month with Rx in chart  Ok with discharge when all others agree    UM:  DC planning on oral antibiotics    Dr. Kuldip Calvert saw the patient, performed the physical exam, reviewed the laboratory data and guided with the formulation of the above problem list, assessment and treatment plan.     Kuldip Calvert MD  6/8/2017

## 2017-06-08 NOTE — PLAN OF CARE
Problem: Patient Care Overview (Adult)  Goal: Plan of Care Review  Outcome: Ongoing (interventions implemented as appropriate)    06/08/17 1154   Coping/Psychosocial Response Interventions   Plan Of Care Reviewed With patient   Outcome Evaluation   Outcome Summary/Follow up Plan Pt presents with limitations in strength, blance, ADL performance, and mobility. Pt with UE tremoring and uses weighted utensils, issued thandle cup. Pt max x 2 STS and dependent transfer. Pr will benefit from OT to advance pt toward increased ind with ADLs and functiaonl mobiltiy. Recommend return to IP rehab upon d/c.          Problem: Inpatient Occupational Therapy  Goal: Bed Mobility Goal LTG- OT  Outcome: Ongoing (interventions implemented as appropriate)    06/08/17 1154   Bed Mobility OT LTG   Bed Mobility OT LTG, Date Established 06/08/17   Bed Mobility OT LTG, Time to Achieve by discharge   Bed Mobility OT LTG, Activity Type supine to sit/sit to supine   Bed Mobility OT LTG, [place] Level moderate assist (50% patient effort)   Bed Mobility OT LTG, Assist Device bed rails       Goal: Transfer Training Goal 1 LTG- OT  Outcome: Ongoing (interventions implemented as appropriate)    06/08/17 1154   Transfer Training OT LTG   Transfer Training OT LTG, Date Established 06/08/17   Transfer Training OT LTG, Time to Achieve by discharge   Transfer Training OT LTG, Activity Type bed to chair /chair to bed   Transfer Training OT LTG, [place] Level moderate assist (50% patient effort);2 person assist required   Transfer Training OT LTG, Assist Device other (see comments)  (appropriate AD)       Goal: Strength Goal LTG- OT  Outcome: Ongoing (interventions implemented as appropriate)    06/08/17 1154   Strength OT LTG   Strength Goal OT LTG, Date Established 06/08/17   Strength Goal OT LTG, Time to Achieve by discharge   Strength Goal OT LTG, Measure to Achieve Pt will complete 2 sets x 10 BUE AROM       Goal: Dynamic Sitting Balance  Goal LTG- OT  Outcome: Ongoing (interventions implemented as appropriate)    06/08/17 1154   Dynamic Sitting Balance OT LTG   Dynamic Sitting Balance OT LTG, Date Established 06/08/17   Dynamic Sitting Balance OT LTG, Time to Achieve by discharge   Dynamic Sitting Balance OT LTG, [place] Level supervision required   Dynamic Sitting Balance OT LTG, Assist Device UE Support       Goal: Grooming Goal LTG- OT  Outcome: Ongoing (interventions implemented as appropriate)    06/08/17 1154   Grooming OT LTG   Grooming Goal OT LTG, Date Established 06/08/17   Grooming Goal OT LTG, Time to Achieve by discharge   Grooming Goal OT LTG, [place] Level set up required

## 2017-06-08 NOTE — THERAPY EVALUATION
Acute Care - Occupational Therapy Initial Evaluation  [facility]     Patient Name: Katherin Ta  : 1934  MRN: [id number]  Today's Date: 2017  Onset of Illness/Injury or Date of Surgery Date: 17  Date of Referral to OT: 17  Referring Physician: MD Giuseppe    Admit Date: 2017       ICD-10-CM ICD-9-CM   1. Somnolence R40.0 780.09   2. Sepsis, due to unspecified organism A41.9 038.9     995.91   3. Impaired functional mobility, balance, gait, and endurance Z74.09 V49.89   4. Impaired mobility and ADLs Z74.09 799.89     Patient Active Problem List   Diagnosis   • Rheumatoid arthritis   • Spinal stenosis   • Hypertension   • Hypothyroid   • Hyperlipidemia   • GERD (gastroesophageal reflux disease)   • Intractable back pain   • Weakness   • Somnolence     Past Medical History:   Diagnosis Date   • GERD (gastroesophageal reflux disease) 2017   • Gout    • Hyperlipidemia 2017   • Hypertension 2017   • Hypothyroid 2017   • Rheumatoid arthritis    • Spinal stenosis      Past Surgical History:   Procedure Laterality Date   • AMPUTATION DIGIT Right     right middle toe   • BACK SURGERY     • EYE SURGERY     • HAND SURGERY     • JOINT REPLACEMENT      left knee   • PERICARDIAL WINDOW            OT ASSESSMENT FLOWSHEET (last 72 hours)      OT Evaluation       17 1029 17 1045 17 0730 17 0400 17 0000    Rehab Evaluation    Document Type evaluation  -AC therapy note (daily note)  -AS       Subjective Information agree to therapy;no complaints  -AC agree to therapy;complains of;weakness  -AS       Patient Effort, Rehab Treatment good  -AC good  -AS       General Information    Patient Profile Review yes  -AC        Onset of Illness/Injury or Date of Surgery Date 17  -AC        Referring Physician MD Giuseppe  -AC        General Observations Pt received supine in bed. Pt's 2 dtrs present in room.   -AC        Pertinent History Of Current Problem Pt admit  from [facility] with R facila droop, RUE weakness, chronic R LE weakness and somnolence.    -AC        Precautions/Limitations fall precautions  -AC fall precautions  -AS       Prior Level of Function independent:;feeding;grooming;max assist:;dressing;bathing   stand pivot/sliding board t/f at [facility],ambualting 1 mo ago  -AC        Equipment Currently Used at Home walker, rolling   transport chair  -AC        Plans/Goals Discussed With patient and family;agreed upon  -AC        Risks Reviewed patient and family:;LOB  -AC        Benefits Reviewed patient and family:;improve function;increase independence;increase strength;increase balance;increase knowledge  -AC        Barriers to Rehab previous functional deficit  -AC        Living Environment    Lives With child(bev), adult   dtr  -AC        Living Arrangements house  -AC        Home Accessibility ramps present at home  -AC        Clinical Impression    Date of Referral to OT 06/07/17  -AC        OT Diagnosis Decreased independence with self care  -AC        Patient/Family Goals Statement Pt would like to increase independence with self care  -AC        Criteria for Skilled Therapeutic Interventions Met yes;treatment indicated  -AC        Rehab Potential good, to achieve stated therapy goals  -AC        Therapy Frequency daily  -AC        Anticipated Discharge Disposition inpatient rehabilitation facility  -AC        Pain Assessment    Pain Assessment No/denies pain  -AC No/denies pain  -AS       Vision Assessment/Intervention    Visual Impairment WNL  -AC        Cognitive Assessment/Intervention    Current Cognitive/Communication Assessment functional  -AC functional  -AS       Orientation Status oriented x 4  -AC oriented to;person;place  -AS       Follows Commands/Answers Questions 100% of the time;needs cueing  -% of the time;able to follow single-step instructions  -AS       Personal Safety WNL/WFL  -AC WNL/WFL  -AS       Personal Safety Interventions fall  prevention program maintained;gait belt;nonskid shoes/slippers when out of bed  -AC fall prevention program maintained;gait belt;nonskid shoes/slippers when out of bed  -AS       ROM (Range of Motion)    General ROM no range of motion deficits identified  -AC        MMT (Manual Muscle Testing)    General MMT Assessment upper extremity strength deficits identified  -AC        General MMT Assessment Detail BUE grossly 4-/5  -AC        Bed Mobility, Assessment/Treatment    Bed Mobility, Assistive Device draw sheet  -AC draw sheet  -AS       Bed Mobility, Roll Left, Kerrick verbal cues required;maximum assist (25% patient effort)   rolled to place lift sling  -AC verbal cues required;maximum assist (25% patient effort);2 person assist required  -AS       Bed Mobility, Roll Right, Kerrick verbal cues required;moderate assist (50% patient effort)  -AC verbal cues required;maximum assist (25% patient effort);2 person assist required;other (see comments)   to position lift sling  -AS       Bed Mobility, Scoot/Bridge, Kerrick verbal cues required;moderate assist (50% patient effort)  -AC        Bed Mob, Supine to Sit, Kerrick verbal cues required;maximum assist (25% patient effort);2 person assist required  -AC        Bed Mobility, Comment  patient rolled to place lift sling  -AS       Transfer Assessment/Treatment    Transfers, Bed-Chair Kerrick dependent (less than 25% patient effort)  -AC dependent (less than 25% patient effort);2 person assist required  -AS       Transfers, Bed-Chair-Bed, Assist Device mechanical lift  -AC mechanical lift  -AS       Transfers, Sit-Stand Kerrick verbal cues required;maximum assist (25% patient effort);2 person assist required  -AC        Transfers, Stand-Sit Kerrick verbal cues required;maximum assist (25% patient effort);2 person assist required  -AC        Transfer, Comment 2 STS with knees blocked  -AC        Motor Skills/Interventions    Additional  Documentation Balance Skills Training (Group)  -        Balance Skills Training    Sitting-Level of Assistance Minimum assistance  -AC        Sitting-Balance Support Feet supported;Right upper extremity supported;Left upper extremity supported  -AC        Sitting-Balance Activities Trunk control activities  -AC        Standing-Level of Assistance Maximum assistance;x2  -AC        Static Standing Balance Support Right upper extremity supported;Left upper extremity supported   knees blocked  -AC        Therapy Exercises    Right Lower Extremity  PROM:;10 reps;supine;ankle pumps/circles;calf stretch;hip abduction/adduction;heel slides  -AS       Left Lower Extremity  AAROM:;10 reps;supine;ankle pumps/circles;calf stretch;hip abduction/adduction;heel slides  -AS       Sensory Assessment/Intervention    Light Touch   LLE;RLE  -BR LLE;RLE  -MC LLE;RLE  -MC    LLE Light Touch   mild impairment  -BR mild impairment  -MC mild impairment  -    RLE Light Touch   severe impairment  -BR severe impairment  - severe impairment  -    General Therapy Interventions    Planned Therapy Interventions ADL retraining;balance training;bed mobility training;strengthening;transfer training  -        Positioning and Restraints    Pre-Treatment Position in bed  -AC in bed  -AS       Post Treatment Position chair  -AC chair  -AS       In Chair reclined;call light within reach;encouraged to call for assist;exit alarm on;with family/caregiver  -AC reclined;call light within reach;encouraged to call for assist;with family/caregiver;on mechanical lift sling  -AS         06/06/17 2000 06/06/17 1110 06/06/17 1014 06/06/17 1013 06/05/17 1411    Rehab Evaluation    Document Type  evaluation  -MB       Subjective Information  agree to therapy;no complaints  -MB       Patient Effort, Rehab Treatment  good  -MB       Symptoms Noted During/After Treatment  none  -MB       General Information    Patient Profile Review  yes  -MB       Onset of  Illness/Injury or Date of Surgery Date  06/05/17  -MB       Referring Physician  KATHY Robles MD  -MB       General Observations  Pt. supine w/ IV L UE intact, telemetry, pulse ox, alexander, on RA.  Nsg consent for PT.  Pt's sister present at bedside.  -MB       Pertinent History Of Current Problem  Pt. adm via EMS from [facility] w/ R side facial droop, R UE weakness, chronic R LE weakness, and somnolence.  Pt. recently adm. to [facility] w/ intractable back pain; pt. not a candidate for spinal sx.  Pt. has h/o spinal stenosis and has morphine pump.  -MB       Precautions/Limitations  fall precautions   morphine pump  -MB       Prior Level of Function  independent:;bed mobility;max assist:;transfer   Pt. was ambulatory approx 1 month ago; SB transfers at [facility].  -MB       Equipment Currently Used at Home  walker, rolling   transport chair  -MB   walker, rolling;wheelchair  -ANGY    Plans/Goals Discussed With  patient and family;agreed upon  -MB       Risks Reviewed  patient and family:;LOB;nausea/vomiting;dizziness;increased discomfort;change in vital signs;lines disloged  -MB       Benefits Reviewed  patient and family:;improve function;increase independence;increase strength;increase balance;decrease pain;decrease risk of DVT;improve skin integrity;increase knowledge  -MB       Barriers to Rehab  previous functional deficit;medically complex  -MB       Living Environment    Lives With  child(bev), adult  -MB child(bev), adult  -SP  child(bev), adult  -ANGY    Living Arrangements  house  -MB house  -SP  house  -ANGY    Home Accessibility  stairs to enter home  -MB   stairs to enter home  -ANGY    Number of Stairs to Enter Home  2  -MB   2  -ANGY    Stair Railings at Home  outside, present on left side  -MB   outside, present on left side  -ANGY    Type of Financial/Environmental Concern     none  -ANGY    Transportation Available     car  -ANGY    Living Environment Comment  Prior to recent hospitalization at [facility] then to [facility], pt. lived w/ dtr and  was ambulating household distances w/ RW.  Pt. non-ambulatory and performing sliding board transfers at [facility] [place].  -MB Daughter lives with the pt. The pt came for [facility]. Prior to recent illness she  required assist with ADLs and used a r walker.  -SP      Functional Level Prior    Ambulation    3-->assistive equipment and person  -SP 3-->assistive equipment and person  -ANGY    Transferring    3-->assistive equipment and person  -SP 3-->assistive equipment and person  -ANGY    Toileting    2-->assistive person  -SP 3-->assistive equipment and person  -ANGY    Bathing    2-->assistive person  -SP 3-->assistive equipment and person  -ANGY    Dressing    2-->assistive person  -SP 3-->assistive equipment and person  -ANGY    Eating    0-->independent  -SP 2-->assistive person  -ANGY    Communication     0-->understands/communicates without difficulty  -ANGY    Swallowing     0-->swallows foods/liquids without difficulty  -ANGY    Prior Functional Level Comment     na   -ANGY    Vital Signs    Pre Systolic BP Rehab  146  -MB       Pre Treatment Diastolic BP  84  -MB       Pretreatment Heart Rate (beats/min)  88  -MB       Pre SpO2 (%)  95  -MB       O2 Delivery Pre Treatment  room air  -MB       Pre Patient Position  Supine  -MB       Intra Patient Position  Standing  -MB       Post Patient Position  Sitting  -MB       Pain Assessment    Pain Assessment  No/denies pain  -MB       Vision Assessment/Intervention    Visual Impairment  WFL with corrective lenses  -MB       Cognitive Assessment/Intervention    Current Cognitive/Communication Assessment  functional  -MB       Orientation Status  oriented x 4  -MB       Follows Commands/Answers Questions  100% of the time;able to follow single-step instructions;needs cueing;needs increased time  -MB       Personal Safety  WNL/WFL  -MB       Personal Safety Interventions  fall prevention program maintained;gait belt;muscle strengthening facilitated;nonskid shoes/slippers when out of bed  -MB        ROM (Range of Motion)    General ROM  no range of motion deficits identified  -MB       MMT (Manual Muscle Testing)    General MMT Assessment  lower extremity strength deficits identified  -MB       General MMT Assessment Detail  L LE grossly 4-/5.  R LE 1/5.   defer UEs to OT.  -MB       Bed Mobility, Assessment/Treatment    Bed Mobility, Assistive Device  bed rails;draw sheet  -MB       Bed Mobility, Roll Left, Waller  maximum assist (25% patient effort);verbal cues required  -MB       Bed Mobility, Roll Right, Waller  maximum assist (25% patient effort);verbal cues required  -MB       Bed Mob, Supine to Sit, Waller  maximum assist (25% patient effort);2 person assist required;verbal cues required  -MB       Bed Mob, Sit to Supine, Waller  not tested  -MB       Bed Mobility, Safety Issues  decreased use of legs for bridging/pushing;decreased use of arms for pushing/pulling  -MB       Bed Mobility, Impairments  sensation decreased;strength decreased;impaired balance;coordination impaired  -MB       Bed Mobility, Comment  Pt. rolled L/R for hygiene.  Pt. incontinent of bowel requiring dep A for hygiene.  VCs for sequencing and assist at hips to facilitate.   -MB       Transfer Assessment/Treatment    Transfers, Bed-Chair Waller  dependent (less than 25% patient effort);2 person assist required;verbal cues required  -MB       Transfers, Chair-Bed Waller  not tested  -MB       Transfers, Bed-Chair-Bed, Assist Device  --   gait belt, B UE support  -MB       Transfer, Safety Issues  balance decreased during turns;sequencing ability decreased;step length decreased;weight-shifting ability decreased;knees buckling  -MB       Transfer, Impairments  sensation decreased;strength decreased;impaired balance;motor control impaired  -MB       Transfer, Comment  Pt. transferred bed -> chair via stand-pivot w/ dep A x 2. Pt. unable to stand upright, shift weight, and advance LEs.  Recommended to  nsg that patient be moved to lift room.    -MB       Motor Skills/Interventions    Additional Documentation  Balance Skills Training (Group)  -MB       Balance Skills Training    Sitting-Level of Assistance  Minimum assistance  -MB       Sitting-Balance Support  Feet supported;Right upper extremity supported;Left upper extremity supported  -MB       Sitting-Balance Activities  Trunk control activities;Forward lean;Lateral lean  -MB       Sitting # of Minutes  10  -MB       Standing-Level of Assistance  Dependent;x2  -MB       Static Standing Balance Support  Right upper extremity supported;Left upper extremity supported   gait belt  -MB       Sensory Assessment/Intervention    Light Touch LLE;RLE  - LLE;RLE  -MB       LLE Light Touch mild impairment  - mild impairment  -MB       RLE Light Touch severe impairment  - severe impairment  -MB       Positioning and Restraints    Pre-Treatment Position  in bed  -MB       Post Treatment Position  chair  -MB       In Chair  notified nsg;reclined;call light within reach;encouraged to call for assist;exit alarm on;with family/caregiver;waffle cushion;on mechanical lift sling;legs elevated;heels elevated  -MB         User Key  (r) = Recorded By, (t) = Taken By, (c) = Cosigned By    Initials Name Effective Dates    AC Lissette Ramos, OT 06/23/15 -     AS Nelly Fink, PTA 06/22/15 -     BRYANNA Sterling, PT 03/14/16 -     SP Corinne Vazquez, RN 03/14/16 -     ANGY Diehl, RN 06/16/16 -     JASMINE Castaneda, WILNER 06/16/16 -     LUZ MARIA Prado, RN 08/22/16 -            Occupational Therapy Education     Title: PT OT SLP Therapies (Active)     Topic: Occupational Therapy (Active)     Point: ADL training (Done)    Description: Instruct learner(s) on proper safety adaptation and remediation techniques during self care or transfers.   Instruct in proper use of assistive devices.    Learning Progress Summary    Learner Readiness Method Response Comment  Documented by Status   Patient Acceptance E,D,H VU Role of OT, isssued UE AROM HEP  06/08/17 1153 Done   Family Acceptance E,D,H VU Role of OT, isssued UE AROM HEP  06/08/17 1153 Done               Point: Home exercise program (Done)    Description: Instruct learner(s) on appropriate technique for monitoring, assisting and/or progressing therapeutic exercises/activities.    Learning Progress Summary    Learner Readiness Method Response Comment Documented by Status   Patient Acceptance E,D,H VU Role of OT, isssued UE AROM [facility] 06/08/17 1153 Done   Family Acceptance E,D,H VU Role of OT, isssued UE AROM HEP  06/08/17 1153 Done                      User Key     Initials Effective Dates Name Provider Type Discipline     06/23/15 -  Lissette Ramos OT Occupational Therapist OT                  OT Recommendation and Plan  Anticipated Discharge Disposition: inpatient rehabilitation facility  Planned Therapy Interventions: ADL retraining, balance training, bed mobility training, strengthening, transfer training  Therapy Frequency: daily  Plan of Care Review  Plan Of Care Reviewed With: patient          OT Goals       06/08/17 1154          Bed Mobility OT LTG    Bed Mobility OT LTG, Date Established 06/08/17  -AC      Bed Mobility OT LTG, Time to Achieve by discharge  -AC      Bed Mobility OT LTG, Activity Type supine to sit/sit to supine  -AC      Bed Mobility OT LTG, [place] Level moderate assist (50% patient effort)  -AC      Bed Mobility OT LTG, Assist Device bed rails  -AC      Transfer Training OT LTG    Transfer Training OT LTG, Date Established 06/08/17  -AC      Transfer Training OT LTG, Time to Achieve by discharge  -AC      Transfer Training OT LTG, Activity Type bed to chair /chair to bed  -AC      Transfer Training OT LTG, [place] Level moderate assist (50% patient effort);2 person assist required  -AC      Transfer Training OT LTG, Assist Device other (see comments)   appropriate AD  -AC       Strength OT LTG    Strength Goal OT LTG, Date Established 06/08/17  -AC      Strength Goal OT LTG, Time to Achieve by discharge  -AC      Strength Goal OT LTG, Measure to Achieve Pt will complete 2 sets x 10 BUE AROM  -AC      Dynamic Sitting Balance OT LTG    Dynamic Sitting Balance OT LTG, Date Established 06/08/17  -AC      Dynamic Sitting Balance OT LTG, Time to Achieve by discharge  -AC      Dynamic Sitting Balance OT LTG, [place] Level supervision required  -AC      Dynamic Sitting Balance OT LTG, Assist Device UE Support  -AC      Grooming OT LTG    Grooming Goal OT LTG, Date Established 06/08/17  -AC      Grooming Goal OT LTG, Time to Achieve by discharge  -AC      Grooming Goal OT LTG, [place] Level set up required  -AC        User Key  (r) = Recorded By, (t) = Taken By, (c) = Cosigned By    Initials Name Provider Type    CHRISTINA Ramos OT Occupational Therapist                Outcome Measures       06/08/17 1029 06/07/17 1045 06/06/17 1110    How much help from another person do you currently need...    Turning from your back to your side while in flat bed without using bedrails?  2  -AS 2  -MB    Moving from lying on back to sitting on the side of a flat bed without bedrails?  2  -AS 2  -MB    Moving to and from a bed to a chair (including a wheelchair)?  1  -AS 1  -MB    Standing up from a chair using your arms (e.g., wheelchair, bedside chair)?  1  -AS 1  -MB    Climbing 3-5 steps with a railing?  1  -AS 1  -MB    To walk in hospital room?  1  -AS 1  -MB    AM-PAC 6 Clicks Score  8  -AS 8  -MB    How much help from another is currently needed...    Putting on and taking off regular lower body clothing? 2  -AC      Bathing (including washing, rinsing, and drying) 2  -AC      Toileting (which includes using toilet bed pan or urinal) 2  -AC      Putting on and taking off regular upper body clothing 2  -AC      Taking care of personal grooming (such as brushing teeth) 3  -AC      Eating meals 3   -AC      Score 14  -      Functional Assessment    Outcome Measure Options AM-PAC 6 Clicks Daily Activity (OT)  -AC AM-PAC 6 Clicks Basic Mobility (PT)  -AS AM-PAC 6 Clicks Basic Mobility (PT)  -MB      User Key  (r) = Recorded By, (t) = Taken By, (c) = Cosigned By    Initials Name Provider Type    AC Lissette Ramos, OT Occupational Therapist    AS Nelly Fink, PTA Physical Therapy Assistant    BRYANNA Sterling, PT Physical Therapist          Time Calculation:   OT Start Time: 1029    Therapy Charges for Today     Code Description Service Date Service Provider Modifiers Qty    65537307423  OT EVAL HIGH COMPLEXITY 4 6/8/2017 Lissette Ramos OT GO 1    [id number]  OT THERAPEUTIC ACT EA 15 MIN 6/8/2017 Lissette Ramos OT GO 1    [id number]  OT SELFCARE CURRENT 6/8/2017 ZHANG Mercedes, CK 1    [id number]  OT SELFCARE PROJECTED 6/8/2017 ZHANG Mercedes, CJ 1          OT G-codes  Functional Assessment Tool Used: Impact 6 clicks  Score: 14  Functional Limitation: Self care  Self Care Current Status (): At least 40 percent but less than 60 percent impaired, limited or restricted  Self Care Goal Status (): At least 20 percent but less than 40 percent impaired, limited or restricted    Lissette Ramos OT  6/8/2017

## 2017-06-09 VITALS
DIASTOLIC BLOOD PRESSURE: 76 MMHG | OXYGEN SATURATION: 94 % | RESPIRATION RATE: 18 BRPM | BODY MASS INDEX: 29.44 KG/M2 | HEIGHT: 62 IN | HEART RATE: 73 BPM | TEMPERATURE: 97.8 F | SYSTOLIC BLOOD PRESSURE: 157 MMHG | WEIGHT: 160 LBS

## 2017-06-09 PROCEDURE — 25010000002 HEPARIN (PORCINE) PER 1000 UNITS: Performed by: INTERNAL MEDICINE

## 2017-06-09 PROCEDURE — 96372 THER/PROPH/DIAG INJ SC/IM: CPT

## 2017-06-09 PROCEDURE — 93005 ELECTROCARDIOGRAM TRACING: CPT | Performed by: NURSE PRACTITIONER

## 2017-06-09 PROCEDURE — 99217 PR OBSERVATION CARE DISCHARGE MANAGEMENT: CPT | Performed by: NURSE PRACTITIONER

## 2017-06-09 PROCEDURE — G8987 SELF CARE CURRENT STATUS: HCPCS

## 2017-06-09 PROCEDURE — G0378 HOSPITAL OBSERVATION PER HR: HCPCS

## 2017-06-09 PROCEDURE — G8988 SELF CARE GOAL STATUS: HCPCS

## 2017-06-09 PROCEDURE — 93010 ELECTROCARDIOGRAM REPORT: CPT | Performed by: INTERNAL MEDICINE

## 2017-06-09 PROCEDURE — G8989 SELF CARE D/C STATUS: HCPCS

## 2017-06-09 RX ORDER — AMOXICILLIN AND CLAVULANATE POTASSIUM 875; 125 MG/1; MG/1
1 TABLET, FILM COATED ORAL EVERY 12 HOURS SCHEDULED
Qty: 10 TABLET | Refills: 0 | Status: SHIPPED | OUTPATIENT
Start: 2017-06-09 | End: 2017-06-14

## 2017-06-09 RX ADMIN — CASTOR OIL AND BALSAM, PERU: 788; 87 OINTMENT TOPICAL at 16:05

## 2017-06-09 RX ADMIN — LISINOPRIL 10 MG: 10 TABLET ORAL at 08:54

## 2017-06-09 RX ADMIN — HEPARIN SODIUM 5000 UNITS: 5000 INJECTION, SOLUTION INTRAVENOUS; SUBCUTANEOUS at 13:41

## 2017-06-09 RX ADMIN — DOCUSATE SODIUM 100 MG: 100 CAPSULE, LIQUID FILLED ORAL at 08:53

## 2017-06-09 RX ADMIN — PANTOPRAZOLE SODIUM 40 MG: 40 TABLET, DELAYED RELEASE ORAL at 05:35

## 2017-06-09 RX ADMIN — COLCHICINE 0.6 MG: 0.6 TABLET, FILM COATED ORAL at 08:53

## 2017-06-09 RX ADMIN — LEVOTHYROXINE SODIUM 75 MCG: 75 TABLET ORAL at 05:35

## 2017-06-09 RX ADMIN — HEPARIN SODIUM 5000 UNITS: 5000 INJECTION, SOLUTION INTRAVENOUS; SUBCUTANEOUS at 05:35

## 2017-06-09 RX ADMIN — BENAZEPRIL HYDROCHLORIDE 10 MG: 5 TABLET, COATED ORAL at 08:54

## 2017-06-09 RX ADMIN — BACLOFEN 10 MG: 10 TABLET ORAL at 12:15

## 2017-06-09 RX ADMIN — SODIUM CHLORIDE 100 ML/HR: 9 INJECTION, SOLUTION INTRAVENOUS at 12:27

## 2017-06-09 RX ADMIN — GABAPENTIN 300 MG: 300 CAPSULE ORAL at 13:42

## 2017-06-09 RX ADMIN — BACLOFEN 10 MG: 10 TABLET ORAL at 05:35

## 2017-06-09 RX ADMIN — ASPIRIN 81 MG 81 MG: 81 TABLET ORAL at 08:54

## 2017-06-09 RX ADMIN — ALLOPURINOL 200 MG: 100 TABLET ORAL at 08:54

## 2017-06-09 RX ADMIN — ACETAMINOPHEN 500 MG: 500 TABLET ORAL at 05:35

## 2017-06-09 RX ADMIN — AMLODIPINE BESYLATE 5 MG: 5 TABLET ORAL at 08:54

## 2017-06-09 RX ADMIN — GABAPENTIN 300 MG: 300 CAPSULE ORAL at 05:35

## 2017-06-09 RX ADMIN — AMOXICILLIN AND CLAVULANATE POTASSIUM 1 TABLET: 875; 125 TABLET, FILM COATED ORAL at 08:53

## 2017-06-09 NOTE — DISCHARGE SUMMARY
"    [facility] Medicine Services  DISCHARGE SUMMARY       Date of Admission: 6/5/2017  Date of Discharge:  6/9/2017  Primary Care Physician: Huang Carnes MD  Consulting Physician(s)     Provider Relationship    Kuldip Calvert MD Consulting Physician          Discharge Diagnoses:  Active Hospital Problems (** Indicates Principal Problem)    Diagnosis Date Noted   • **Somnolence [R40.0] 06/05/2017   • Hypothyroid [E03.9] 05/11/2017   • Hypertension [I10] 05/11/2017   • Hyperlipidemia [E78.5] 05/11/2017   • GERD (gastroesophageal reflux disease) [K21.9] 05/11/2017   • Weakness [R53.1] 05/11/2017      Resolved Hospital Problems    Diagnosis Date Noted Date Resolved   No resolved problems to display.       Presenting Problem/History of Present Illness  Somnolence [R40.0]  Somnolence [R40.0]        History of Present Illness on Day of Discharge:   Patient is asleep lying in bed but easily awakened.  The patient reports having mild chest \"tightness\" this am.  Denies any associated SOA, no n/v.    Hospital Course  Ms. Ta is a 82 y.o. Female with pertinent medical history of GERD, gout, hyperlipidemia, hypertension, hypothyroid, RA, and severe spinal stenosis who presented to [facility] ED on 6/5/17 for altered mental status, right-sided facial droop and right arm weakness.  She had been at [facility] after being admitted and d/c'd from [facility] for non-operable intractable back pain (5/10-5/16).  The patient was admitted for hospitalist services and found to have fever and tachycardia with mild leukocytosis.      Upon admission, MRI did not reveal any intracranial abnormalities, U/A was normal but blood cultures sent.  CT of abdomen suggestive of focal colitis vs. Diverticulitis.  The patient was started on antibiotics and ID was consulted.  The patients condition has continued to stabilize.  She did experience chest tightness this am but EKG is not compelling for any cardiac events.  The patient will be " discharged today and transferred via ambulance to the [place].  The patient will need to complete course of abx and probiotics per ID recommendations.      Procedures Performed     NONE    Consults:   Consults     Date and Time Order Name Status Description    6/8/2017 0940 Inpatient Consult to Cardiology      6/7/2017 0653 Inpatient Consult to Infectious Diseases Completed     5/11/2017 0131 Inpatient Consult to Neurosurgery Completed           Pertinent Test Results:  Component      Latest Ref Rng & Units 6/8/2017   C-Reactive Protein      0.00 - 1.00 mg/dL 6.16 (H)     Component      Latest Ref Rng & Units 6/7/2017   Vancomycin Trough      10.00 - 20.00 mcg/mL 6.60 (L)     Component      Latest Ref Rng & Units 6/7/2017   Glucose      70 - 100 mg/dL 93   BUN      9 - 23 mg/dL 12   Creatinine      0.60 - 1.30 mg/dL 0.60   Sodium      132 - 146 mmol/L 143   Potassium      3.5 - 5.5 mmol/L 3.7   Chloride      99 - 109 mmol/L 111 (H)   CO2      20.0 - 31.0 mmol/L 30.0   Calcium      8.7 - 10.4 mg/dL 9.8   eGFR Non African Amer      >60 mL/min/1.73 96   BUN/Creatinine Ratio      7.0 - 25.0 20.0   Anion Gap      3.0 - 11.0 mmol/L 2.0 (L)     Component      Latest Ref Rng & Units 6/7/2017   WBC      3.50 - 10.80 10*3/mm3 7.96   RBC      3.89 - 5.14 10*6/mm3 3.28 (L)   Hemoglobin      11.5 - 15.5 g/dL 10.8 (L)   Hematocrit      34.5 - 44.0 % 33.6 (L)   MCV      80.0 - 99.0 fL 102.4 (H)   MCH      27.0 - 31.0 pg 32.9 (H)   MCHC      32.0 - 36.0 g/dL 32.1   RDW      11.3 - 14.5 % 15.3 (H)   RDW-SD      37.0 - 54.0 fl 57.2 (H)   MPV      6.0 - 12.0 fL 11.2   Platelets      150 - 450 10*3/mm3 147 (L)     Component      Latest Ref Rng & Units 6/6/2017   WBC      3.50 - 10.80 10*3/mm3 12.97 (H)   RBC      3.89 - 5.14 10*6/mm3 3.41 (L)   Hemoglobin      11.5 - 15.5 g/dL 11.2 (L)   Hematocrit      34.5 - 44.0 % 35.2   MCV      80.0 - 99.0 fL 103.2 (H)   MCH      27.0 - 31.0 pg 32.8 (H)   MCHC      32.0 - 36.0 g/dL 31.8 (L)    RDW      11.3 - 14.5 % 15.8 (H)   RDW-SD      37.0 - 54.0 fl 60.2 (H)   MPV      6.0 - 12.0 fL 11.5   Platelets      150 - 450 10*3/mm3 155   Neutrophil %      41.0 - 71.0 % 72.3 (H)   Lymphocyte %      24.0 - 44.0 % 18.0 (L)   Monocyte %      0.0 - 12.0 % 8.8   Eosinophil %      0.0 - 3.0 % 0.6   Basophil %      0.0 - 1.0 % 0.1   Immature Grans %      0.0 - 0.6 % 0.2   Neutrophils, Absolute      1.50 - 8.30 10*3/mm3 9.39 (H)   Lymphocytes, Absolute      0.60 - 4.80 10*3/mm3 2.33   Monocytes, Absolute      0.00 - 1.00 10*3/mm3 1.14 (H)   Eosinophils, Absolute      0.10 - 0.30 10*3/mm3 0.08 (L)   Basophils, Absolute      0.00 - 0.20 10*3/mm3 0.01   Immature Grans, Absolute      0.00 - 0.03 10*3/mm3 0.02     Component      Latest Ref Rng & Units 6/6/2017   Glucose      70 - 100 mg/dL 101 (H)   BUN      9 - 23 mg/dL 15   Creatinine      0.60 - 1.30 mg/dL 0.60   Sodium      132 - 146 mmol/L 143   Potassium      3.5 - 5.5 mmol/L 3.8   Chloride      99 - 109 mmol/L 111 (H)   CO2      20.0 - 31.0 mmol/L 27.0   Calcium      8.7 - 10.4 mg/dL 9.6   eGFR Non African Amer      >60 mL/min/1.73 96   BUN/Creatinine Ratio      7.0 - 25.0 25.0   Anion Gap      3.0 - 11.0 mmol/L 5.0     Component      Latest Ref Rng & Units 6/5/2017   Lactate, Venous      0.5 - 2.0 mmol/L 1.6   Urinalysis With / Culture If Indicated   Order: [id number]   Status:  Final result   Visible to patient:  No (Not Released)      Ref Range & Units 4d ago     Color, UA Yellow, Straw Yellow   Appearance, UA Clear Clear   pH, UA 5.0 - 8.0 7.5   Specific Gravity, UA 1.001 - 1.030 1.008   Glucose, UA Negative Negative   Ketones, UA Negative Negative   Bilirubin, UA Negative Negative   Blood, UA Negative Negative   Protein, UA Negative Negative   Leuk Esterase, UA Negative Negative   Nitrite, UA Negative Negative   Urobilinogen, UA 0.2 - 1.0 E.U./dL 0.2 E.U./dL   Resulting Agency  [facility]   Narrative   Urine microscopic not indicated.      Specimen Collected:  "06/05/17  3:36 PM   Last Resulted: 06/05/17  4:03 PM              Component      Latest Ref Rng & Units 6/5/2017   Troponin I      0.00 - 0.07 ng/mL 0.01       CT chest w/o contrast  IMPRESSION:  Atelectatic changes in lung bases bilaterally. Some  stranding identified surrounding abnormal wall thickening of the  ascending colon. Findings suggesting either focal colitis or  diverticulitis. No abscess collection or free air.      D: 06/05/2017  E: 06/05/2017          This report was finalized on 6/6/2017 12:13 PM by Dr. Mirela Haynes MD.    CT abdomen pelvis w/o contrast  IMPRESSION:  Atelectatic changes in lung bases bilaterally. Some  stranding identified surrounding abnormal wall thickening of the  ascending colon. Findings suggesting either focal colitis or  diverticulitis. No abscess collection or free air.      D: 06/05/2017  E: 06/05/2017          This report was finalized on 6/6/2017 12:13 PM by Dr. Mirela Haynes MD.        MRI brain w/o contrast  IMPRESSION:  Atrophy of the brain with some chronic small vessel ischemic  changes seen in the periventricular and subcortical white matter. No  acute intracranial abnormality is identified.      D: 06/05/2017  E: 06/05/2017          This report was finalized on 6/6/2017 12:13 PM by Dr. Mirela Haynes MD.    Chest xray  IMPRESSION:  Heart is enlarged with prominence of the pulmonary  vascularity bilaterally. Lung volumes are low.      D: 06/05/2017  E: 06/05/2017      This report was finalized on 6/6/2017 12:10 PM by Dr. Mirela Haynes MD.      Condition on Discharge:  Stable    Physical Exam on Discharge:/90  Pulse 68  Temp 97.8 °F (36.6 °C) (Oral)   Resp 18  Ht 62\" (157.5 cm)  Wt 160 lb (72.6 kg)  SpO2 96%  BMI 29.26 kg/m2  Physical Exam   Constitutional: She is oriented to person, place, and time. She appears well-developed and well-nourished. No distress.   HENT:   Head: Normocephalic.   Eyes: Pupils are equal, round, and " reactive to light.   Neck: Normal range of motion.   Cardiovascular: Normal rate and regular rhythm.    Pulmonary/Chest: Effort normal and breath sounds normal.   Abdominal: Soft. Bowel sounds are normal. She exhibits no distension. There is no tenderness.   Musculoskeletal: She exhibits no edema.   Neurological: She is alert and oriented to person, place, and time.   Skin: Skin is warm and dry.   Psychiatric: She has a normal mood and affect.   Vitals reviewed.        Discharge Disposition  Rehab Facility or Unit (DC - External)    Discharge Medications   Katherin Ta   [facility] Medication Instructions SYLVIA:[id number]    Printed on:06/09/17 0259   Medication Information                      acetaminophen (TYLENOL) 500 MG tablet  Take 500 mg by mouth Every 6 (Six) Hours As Needed for Mild Pain (1-3).             allopurinol (ZYLOPRIM) 100 MG tablet  Take 200 mg by mouth Daily.             amLODIPine (NORVASC) 5 MG tablet  Take 1 tablet by mouth Daily.             amoxicillin-clavulanate (AUGMENTIN) 875-125 MG per tablet  Take 1 tablet by mouth Every 12 (Twelve) Hours for 5 days. Indications: Infection Within the Abdomen             Apoaequorin (PREVAGEN PO)  Take 1 tablet by mouth Daily.             aspirin 81 MG tablet  Take 81 mg by mouth Daily.             atorvastatin (LIPITOR) 10 MG tablet  Take 10 mg by mouth Every Night.             baclofen (LIORESAL) 10 MG tablet  Take 10 mg by mouth 4 (Four) Times a Day.             benazepril (LOTENSIN) 10 MG tablet  Take 10 mg by mouth Daily.             castor oil-balsam peru (VENELEX) ointment  Apply 1 application topically Every 12 (Twelve) Hours.             cetirizine (zyrTEC) 10 MG tablet  Take 1 tablet by mouth Daily.             colchicine 0.6 MG tablet  Take 0.6 mg by mouth Daily.             docusate sodium (COLACE) 100 MG capsule  Take 100 mg by mouth 2 (Two) Times a Day.             gabapentin (NEURONTIN) 800 MG tablet  Take 1,600 mg by mouth Every 6 (Six)  Hours.             levothyroxine (SYNTHROID, LEVOTHROID) 75 MCG tablet  Take 75 mcg by mouth Daily.             lisinopril (PRINIVIL,ZESTRIL) 10 MG tablet  Take 10 mg by mouth Daily.             melatonin 5 MG sublingual tablet sublingual tablet  Place 1 tablet under the tongue At Night As Needed (sleep).             MORPHINE SULFATE IV  Infuse 2.399 mg into a venous catheter Daily. MORPHINE PUMP 2.399 MG PER DAY  IMPLANT INTERNAL LEFT ABDOMEN             Multiple Vitamin (MULTI VITAMIN PO)  Take 1 tablet by mouth Daily.             Omega-3 Fatty Acids (FISH OIL) 1000 MG capsule capsule  Take 1,000 mg by mouth Daily With Breakfast.             pantoprazole (PROTONIX) 20 MG EC tablet  Take 20 mg by mouth Every Morning.                 Discharge Diet:   Diet Instructions     Diet: Regular; Thin Liquids, No Restrictions       Discharge Diet:  Regular   Fluid Consistency:  Thin Liquids, No Restrictions                 Discharge Care Plan / Instructions:    Activity at Discharge:   Activity Instructions     Activity as Tolerated                     Follow-up Appointments  No future appointments.  Additional Instructions for the Follow-ups that You Need to Schedule     Discharge Follow-up with PCP    As directed    Follow Up Details:  follow up with PCP or facility provider within 1 week of discharge                 Test Results Pending at Discharge   Order Current Status    Blood Culture Preliminary result    Blood Culture Preliminary result           Alexandrea Hsu, KASEY 06/09/17 1:29 PM    Time: Discharge 35 min    Please note that portions of this note may have been completed with a voice recognition program. Efforts were made to edit the dictations, but occasionally words are mistranscribed.

## 2017-06-09 NOTE — PLAN OF CARE
Problem: Patient Care Overview (Adult)  Goal: Plan of Care Review  Outcome: Unable to achieve outcome(s) by discharge Date Met:  06/09/17 06/09/17 1539   Coping/Psychosocial Response Interventions   Plan Of Care Reviewed With patient   Outcome Evaluation   Outcome Summary/Follow up Plan Pt pending DC to rehab this date. No goals met due to short LOS. DC OT from [facility]         Problem: Inpatient Occupational Therapy  Goal: Bed Mobility Goal LTG- OT  Outcome: Unable to achieve outcome(s) by discharge Date Met:  06/09/17 06/08/17 1154 06/09/17 1539   Bed Mobility OT LTG   Bed Mobility OT LTG, Date Established 06/08/17 --    Bed Mobility OT LTG, Time to Achieve by discharge --    Bed Mobility OT LTG, Activity Type supine to sit/sit to supine --    Bed Mobility OT LTG, [place] Level moderate assist (50% patient effort) --    Bed Mobility OT LTG, Assist Device bed rails --    Bed Mobility OT LTG, Outcome --  goal not met   Bed Mobility OT LTG, Reason Goal Not Met --  discharged from facility       Goal: Transfer Training Goal 1 LTG- OT  Outcome: Unable to achieve outcome(s) by discharge Date Met:  06/09/17 06/08/17 1154 06/09/17 1539   Transfer Training OT LTG   Transfer Training OT LTG, Date Established 06/08/17 --    Transfer Training OT LTG, Time to Achieve by discharge --    Transfer Training OT LTG, Activity Type bed to chair /chair to bed --    Transfer Training OT LTG, [place] Level moderate assist (50% patient effort);2 person assist required --    Transfer Training OT LTG, Assist Device other (see comments)  (appropriate AD) --    Transfer Training OT LTG, Outcome --  goal not met   Transfer Training OT LTG, Reason Goal Not Met --  discharged from facility       Goal: Strength Goal LTG- OT  Outcome: Unable to achieve outcome(s) by discharge Date Met:  06/09/17 06/08/17 1154 06/09/17 1539   Strength OT LTG   Strength Goal OT LTG, Date Established 06/08/17 --    Strength Goal OT LTG, Time to  Achieve by discharge --    Strength Goal OT LTG, Measure to Achieve Pt will complete 2 sets x 10 BUE AROM --    Strength Goal OT LTG, Outcome --  goal not met   Strength Goal OT LTG, Reason Goal Not Met --  discharged from facility       Goal: Dynamic Sitting Balance Goal LTG- OT  Outcome: Unable to achieve outcome(s) by discharge Date Met:  06/09/17 06/08/17 1154 06/09/17 1539   Dynamic Sitting Balance OT LTG   Dynamic Sitting Balance OT LTG, Date Established 06/08/17 --    Dynamic Sitting Balance OT LTG, Time to Achieve by discharge --    Dynamic Sitting Balance OT LTG, [place] Level supervision required --    Dynamic Sitting Balance OT LTG, Assist Device UE Support --    Dynamic Sitting Balance OT LTG, Outcome --  goal not met   Dynamic Sitting Balance OT LTG, Reason Goal Not Met --  discharged from facility       Goal: Grooming Goal LTG- OT  Outcome: Unable to achieve outcome(s) by discharge Date Met:  06/09/17 06/08/17 1154 06/09/17 1539   Grooming OT LTG   Grooming Goal OT LTG, Date Established 06/08/17 --    Grooming Goal OT LTG, Time to Achieve by discharge --    Grooming Goal OT LTG, [place] Level set up required --    Grooming Goal OT LTG, Outcome --  goal not met   Grooming Goal OT LTG, Reason Goal Not Met --  discharged from facility

## 2017-06-09 NOTE — PLAN OF CARE
Problem: Patient Care Overview (Adult)  Goal: Plan of Care Review  Outcome: Ongoing (interventions implemented as appropriate)  Goal: Adult Individualization and Mutuality  Outcome: Ongoing (interventions implemented as appropriate)  Goal: Discharge Needs Assessment  Outcome: Ongoing (interventions implemented as appropriate)    Problem: Pain, Acute (Adult)  Goal: Acceptable Pain Control/Comfort Level  Outcome: Ongoing (interventions implemented as appropriate)    Problem: Confusion, Acute (Adult)  Goal: Cognitive/Functional Impairments Minimized  Outcome: Ongoing (interventions implemented as appropriate)  Goal: Safety  Outcome: Ongoing (interventions implemented as appropriate)    Problem: Skin Integrity Impairment, Risk/Actual (Adult)  Goal: Skin Integrity/Wound Healing  Outcome: Ongoing (interventions implemented as appropriate)    Problem: Pressure Ulcer Risk (Dg Scale) (Adult,Obstetrics,Pediatric)  Goal: Skin Integrity  Outcome: Ongoing (interventions implemented as appropriate)    Problem: Pressure Ulcer (Adult)  Goal: Signs and Symptoms of Listed Potential Problems Will be Absent or Manageable (Pressure Ulcer)  Outcome: Ongoing (interventions implemented as appropriate)

## 2017-06-09 NOTE — PROGRESS NOTES
"[facility] Progress Note    Date of Admission: 2017      Antibiotics:  Augmentin    CC:   Chief Complaint   Patient presents with   • Altered Mental Status       S: No new changes. Ongoing weakness. Plans to go to [facility] today for rehab    O:  /90  Pulse 76  Temp 97 °F (36.1 °C)  Resp 18  Ht 62\" (157.5 cm)  Wt 160 lb (72.6 kg)  SpO2 96%  BMI 29.26 kg/m2  Temp (24hrs), Av °F (36.1 °C), Min:97 °F (36.1 °C), Max:97 °F (36.1 °C)    GENERAL: Awake and alert, in no acute distress. Eating breakfast. NAD. Good historian   HEENT: Normocephalic, atraumatic. PERRL. EOMI. No conjunctival injection. No icterus. Oropharynx clear without evidence of thrush or exudate. No evidence of peridontal disease. Dry MM   NECK: Supple without nuchal rigidity  LYMPH: No cervical, axillary or inguinal lymphadenopathy. No neck masses  HEART: RRR; No murmur, rubs, gallops.   LUNGS: Clear to auscultation bilaterally without wheezing, rales, rhonchi. Normal respiratory effort. Use of Supplemental O2 at 2 L   ABDOMEN: Soft, nontender, nondistended. Positive bowel sounds. No rebound or guarding. Pain pump pocket palpable and nontender  EXT: No joint effusions. Limited mobility in the bed. Good upper ext strength  : Normal appearing genitalia  And Gilbert removed  MSK: Essential tremor upper extremities. No joint effusions. Nonambulatory.   SKIN: Warm and dry without cutaneous eruptions.   NEURO: Oriented to PPT. No focal deficits.   PSYCHIATRIC: Normal insight and judgement. Cooperative with PE    Laboratory Data      Results from last 7 days  Lab Units 17  0520 17  0507 17  1012   WBC 10*3/mm3 7.96 12.97* 17.66*   HEMOGLOBIN g/dL 10.8* 11.2* 12.5   HEMATOCRIT % 33.6* 35.2 38.6   PLATELETS 10*3/mm3 147* 155 162       Results from last 7 days  Lab Units 17  0520   SODIUM mmol/L 143   POTASSIUM mmol/L 3.7   CHLORIDE mmol/L 111*   TOTAL CO2 mmol/L 30.0   BUN mg/dL 12   CREATININE mg/dL 0.60   GLUCOSE mg/dL 93 "   CALCIUM mg/dL 9.8       Results from last 7 days  Lab Units 06/05/17  1012   ALK PHOS U/L 107*   BILIRUBIN mg/dL 0.7   ALT (SGPT) U/L 31   AST (SGOT) U/L 24       Results from last 7 days  Lab Units 06/05/17  1012   SED RATE mm/hr 26       Results from last 7 days  Lab Units 06/08/17  0522   CRP mg/dL 6.16*   Improved    Estimated Creatinine Clearance: 50.6 mL/min (by C-G formula based on Cr of 0.6).      Microbiology:  Blood culture: NGSF x 2    Radiology:  Imaging Results (last 24 hours)     ** No results found for the last 24 hours. **        PROBLEM LIST:   Ascending Colitis  Leukocytosis  Chronic constipation  Descending colon diverticula  R arm and facial drooping- MRI negative for CVA  Symptoms resolved  Severe spinal stenosis with degenerative disc and collapse  Chronic back pain with indwelling morphine pain pump   HTN, HLP, hypothyroidism      ASSESSMENT:  Patient is an 82-year-old female with a history of chronic back pain with severe spinal stenosis and degenerative disc disease with vertebral collapse noted on recent MRI of the lumbar spine. She is a nonsurgical candidate with a chronic indwelling pain pump. She is [facility] from [facility] secondary to lethargy, confusion, right arm weakness and right-sided facial droop. MRI performed at admission negative for acute CVA. She was found to have leukocytosis and imaging of the abdomen, pelvis, chest showed descending colitis with coli on it diverticula noted. No free fluid or evidence of abscess or perforation. She is on vancomycin and Zosyn at this time with infectious disease consultation for treatment recommendations.  She does have a history of chronic constipation. The cultures collected and negative. She is responded well to suppository use. At this time, would de-escalate antibiotics to Augmentin to cover for mild colitis as white blood cell count is now normal with no other clear source of infection other than ascending  colitis on CT scan      PLAN:  Augmentin 875 PO BID x 5 days    Plan for PO therapy at the [facility]  Probiotic x 1 month with Rx in chart  Ok with discharge when all others agree    UM:  DC planning on oral antibiotics    Dr. Kuldip Calvert saw the patient, performed the physical exam, reviewed the laboratory data and guided with the formulation of the above problem list, assessment and treatment plan.     Kuldip Calvert MD  6/9/2017

## 2017-06-09 NOTE — PROGRESS NOTES
Continued Stay Note  [facility]     Patient Name: Katherin Ta  MRN: [id number]  Today's Date: 6/9/2017    Admit Date: 6/5/2017          Discharge Plan       06/09/17 1432    Case Management/Social Work Plan    Plan SNF    Patient/Family In Agreement With Plan yes    Additional Comments Per Nicolle at [facility], insurance has approved a skilled bed for today. Please call report to [phone] and send copied chart, summary and any scripts for controlled meds. Ambulance [phone]) set for 1600. Pt and family in agreement. The SNF states the pt has some full covered days left. She did not know how many. I spoke with the APRN regarding the family wanting family medical leave papers filled out. They have been instructed to go through their PCP or the SNF MD.    Final Note    Final Note To SNF. The SNFs are aware of the pts preference to transfer to [place] if a bed opens at some point. Pt and family updated.              Discharge Codes     None        Expected Discharge Date and Time     Expected Discharge Date Expected Discharge Time    Jun 9, 2017             Corinne Vazquez RN

## 2017-06-09 NOTE — THERAPY DISCHARGE NOTE
Acute Care - Occupational Therapy Discharge Summary  [facility]     Patient Name: Katherin Ta  : 1934  MRN: [id number]    Today's Date: 2017  Onset of Illness/Injury or Date of Surgery Date: 17    Date of Referral to OT: 17  Referring Physician: MD Giuseppe      Admit Date: 2017        OT Recommendation and Plan    Visit Dx:    ICD-10-CM ICD-9-CM   1. Somnolence R40.0 780.09   2. Sepsis, due to unspecified organism A41.9 038.9     995.91   3. Impaired functional mobility, balance, gait, and endurance Z74.09 V49.89   4. Impaired mobility and ADLs Z74.09 799.89                     OT Goals       17 1539 17 1154       Bed Mobility OT LTG    Bed Mobility OT LTG, Date Established  17  -     Bed Mobility OT LTG, Time to Achieve  by discharge  -     Bed Mobility OT LTG, Activity Type  supine to sit/sit to supine  -     Bed Mobility OT LTG, [place] Level  moderate assist (50% patient effort)  -     Bed Mobility OT LTG, Assist Device  bed rails  -     Bed Mobility OT LTG, Outcome goal not met  -      Bed Mobility OT LTG, Reason Goal Not Met discharged from [facility]  -      Transfer Training OT LTG    Transfer Training OT LTG, Date Established  17  -     Transfer Training OT LTG, Time to Achieve  by discharge  -     Transfer Training OT LTG, Activity Type  bed to chair /chair to bed  -AC     Transfer Training OT LTG, [place] Level  moderate assist (50% patient effort);2 person assist required  -     Transfer Training OT LTG, Assist Device  other (see comments)   appropriate AD  -     Transfer Training OT LTG, Outcome goal not met  -      Transfer Training OT LTG, Reason Goal Not Met discharged from facility  -      Strength OT LTG    Strength Goal OT LTG, Date Established  17  -     Strength Goal OT LTG, Time to Achieve  by discharge  -     Strength Goal OT LTG, Measure to Achieve  Pt will complete 2 sets x 10 BUE AROM  -AC      Strength Goal OT LTG, Outcome goal not met  -      Strength Goal OT LTG, Reason Goal Not Met discharged from [facility]  -      Dynamic Sitting Balance OT LTG    Dynamic Sitting Balance OT LTG, Date Established  06/08/17  -     Dynamic Sitting Balance OT LTG, Time to Achieve  by discharge  -     Dynamic Sitting Balance OT LTG, [place] Level  supervision required  -     Dynamic Sitting Balance OT LTG, Assist Device  UE Support  -     Dynamic Sitting Balance OT LTG, Outcome goal not met  -      Dynamic Sitting Balance OT LTG, Reason Goal Not Met discharged from [facility]  -      Grooming OT LTG    Grooming Goal OT LTG, Date Established  06/08/17  -     Grooming Goal OT LTG, Time to Achieve  by discharge  -     Grooming Goal OT LTG, [place] Level  set up required  -     Grooming Goal OT LTG, Outcome goal not met  -      Grooming Goal OT LTG, Reason Goal Not Met discharged from [facility]  -        User Key  (r) = Recorded By, (t) = Taken By, (c) = Cosigned By    Initials Name Provider Type     Lissette Ramos, OT Occupational Therapist     Katherin Hanson, OT Occupational Therapist                Outcome Measures       06/09/17 1541 06/08/17 1029 06/08/17 1025    How much help from another person do you currently need...    Turning from your back to your side while in flat bed without using bedrails?   2  -BD    Moving from lying on back to sitting on the side of a flat bed without bedrails?   2  -BD    Moving to and from a bed to a chair (including a wheelchair)?   1  -BD    Standing up from a chair using your arms (e.g., wheelchair, bedside chair)?   1  -BD    Climbing 3-5 steps with a railing?   1  -BD    To walk in hospital room?   1  -BD    AM-PAC 6 Clicks Score   8  -BD    How much help from another is currently needed...    Putting on and taking off regular lower body clothing? 2  - 2  -AC     Bathing (including washing, rinsing, and drying) 2  - 2  -AC     Toileting (which  includes using toilet bed pan or urinal) 2  - 2  -AC     Putting on and taking off regular upper body clothing 2  - 2  -AC     Taking care of personal grooming (such as brushing teeth) 3  - 3  -AC     Eating meals 3   scores per last OT session  - 3  -AC     Score 14  - 14  -AC     Functional Assessment    Outcome Measure Options  AM-PAC 6 Clicks Daily Activity (OT)  - AM-[facility] 6 Clicks Basic Mobility (PT)  -      06/07/17 1045          How much help from another person do you currently need...    Turning from your back to your side while in flat bed without using bedrails? 2  -AS      Moving from lying on back to sitting on the side of a flat bed without bedrails? 2  -AS      Moving to and from a bed to a chair (including a wheelchair)? 1  -AS      Standing up from a chair using your arms (e.g., wheelchair, bedside chair)? 1  -AS      Climbing 3-5 steps with a railing? 1  -AS      To walk in hospital room? 1  -AS      AM-[facility] 6 Clicks Score 8  -AS      Functional Assessment    Outcome Measure Options AM-[facility] 6 Clicks Basic Mobility (PT)  -AS        User Key  (r) = Recorded By, (t) = Taken By, (c) = Cosigned By    Initials Name Provider Type    AC Lissette Ramos, OT Occupational Therapist    AS Nelly Fink, PTA Physical Therapy Assistant     Katherin Hanson OT Occupational Therapist    SANDHYA Shannon, PT Physical Therapist          Therapy Charges for Today     Code Description Service Date Service Provider Modifiers Qty    69008015238 HC OT SELFCARE CURRENT 6/9/2017 ZHANG Anderson CK 1    20682992985 HC OT SELFCARE PROJECTED 6/9/2017 ZHANG Anderson CJ 1    60521836966 HC OT SELFCARE DISCHARGE 6/9/2017 ZHANG Anderson CK 1          OT Discharge Summary  Anticipated Discharge Disposition: inpatient rehabilitation facility  Reason for Discharge: Discharge from facility  Outcomes Achieved: Refer to plan of care for updates on goals achieved  Discharge Destination:  (If DC does not  occur, cont OT POC \)      Katherin Hanson, OT  6/9/2017

## 2017-06-09 NOTE — DISCHARGE PLACEMENT REQUEST
"Carmen Ta (82 y.o. Female)   To [facility]  From Corinne Vazquez [phone]    Date of Birth Social Security Number Address Home Phone MRN    1934  [address] [id number] [phone] [id number]    [facility] Mar[facility] Status          None        Admission Date Admission Type Admitting Provider Attending Provider Department, Room/Bed    6/5/17 Emergency Ellen Chin MD Opii, [place], MD [facility] 5G, S552/1    Discharge Date Discharge Disposition Discharge Destination         Rehab Facility or Unit (DC - External)             Attending Provider: Ellen Chin MD     Allergies:  No Known Allergies    Isolation:  None   Infection:  None   Code Status:  FULL    Ht:  62\" (157.5 cm)   Wt:  160 lb (72.6 kg)    Admission Cmt:  None   Principal Problem:  Somnolence [R40.0]                 Active Insurance as of 6/5/2017     Primary Coverage     Payor Plan Insurance Group Employer/Plan Group    HUMANA MEDICARE REPLACEMENT HUMANA MEDICARE REPL [id number]     Payor Plan Address Payor Plan Phone Number Effective From Effective To    [address] [address] [phone] 1/1/2013     [place] [address]       Subscriber Name Subscriber Birth Date Member ID       CARMEN TA 1934 [id number]                 Emergency Contacts      (Rel.) Home Phone Work Phone Mobile Phone    Maria Ta (Power of ) [phone] -- [phone]    Ashlee Lam (Daughter) -- -- [phone]                 Discharge Summary      KASEY Leyva at 6/9/2017  9:00 AM              [facility] Medicine Services  DISCHARGE SUMMARY       Date of Admission: 6/5/2017  Date of Discharge:  6/9/2017  Primary Care Physician: Huang Carnes MD  Consulting Physician(s)     Provider Relationship    Kuldip Calvert MD Consulting Physician          Discharge Diagnoses:  Active Hospital Problems (** Indicates Principal Problem)    Diagnosis Date " "Noted   • **Somnolence [R40.0] 06/05/2017   • Hypothyroid [E03.9] 05/11/2017   • Hypertension [I10] 05/11/2017   • Hyperlipidemia [E78.5] 05/11/2017   • GERD (gastroesophageal reflux disease) [K21.9] 05/11/2017   • Weakness [R53.1] 05/11/2017      Resolved Hospital Problems    Diagnosis Date Noted Date Resolved   No resolved problems to display.       Presenting Problem/History of Present Illness  Somnolence [R40.0]  Somnolence [R40.0]        History of Present Illness on Day of Discharge:   Patient is asleep lying in bed but easily awakened.  The patient reports having mild chest \"tightness\" this am.  Denies any associated SOA, no n/v.    Hospital Course  Ms. Ta is a 82 y.o. Female with pertinent medical history of GERD, gout, hyperlipidemia, hypertension, hypothyroid, RA, and severe spinal stenosis who presented to [facility] ED on 6/5/17 for altered mental status, right-sided facial droop and right arm weakness.  She had been at [facility] after being admitted and d/c'd from [facility] for non-operable intractable back pain (5/10-5/16).  The patient was admitted for hospitalist services and found to have fever and tachycardia with mild leukocytosis.      Upon admission, MRI did not reveal any intracranial abnormalities, U/A was normal but blood cultures sent.  CT of abdomen suggestive of focal colitis vs. Diverticulitis.  The patient was started on antibiotics and ID was consulted.  The patients condition has continued to stabilize.  She did experience chest tightness this am but EKG is not compelling for any cardiac events.  The patient will be discharged today and transferred via ambulance to the [place].  The patient will need to complete course of abx and probiotics per ID recommendations.      Procedures Performed     NONE    Consults:   Consults     Date and Time Order Name Status Description    6/8/2017 0940 Inpatient Consult to Cardiology      6/7/2017 0653 Inpatient Consult to Infectious Diseases Completed     " 5/11/2017 0131 Inpatient Consult to Neurosurgery Completed           Pertinent Test Results:  Component      Latest Ref Rng & Units 6/8/2017   C-Reactive Protein      0.00 - 1.00 mg/dL 6.16 (H)     Component      Latest Ref Rng & Units 6/7/2017   Vancomycin Trough      10.00 - 20.00 mcg/mL 6.60 (L)     Component      Latest Ref Rng & Units 6/7/2017   Glucose      70 - 100 mg/dL 93   BUN      9 - 23 mg/dL 12   Creatinine      0.60 - 1.30 mg/dL 0.60   Sodium      132 - 146 mmol/L 143   Potassium      3.5 - 5.5 mmol/L 3.7   Chloride      99 - 109 mmol/L 111 (H)   CO2      20.0 - 31.0 mmol/L 30.0   Calcium      8.7 - 10.4 mg/dL 9.8   eGFR Non African Amer      >60 mL/min/1.73 96   BUN/Creatinine Ratio      7.0 - 25.0 20.0   Anion Gap      3.0 - 11.0 mmol/L 2.0 (L)     Component      Latest Ref Rng & Units 6/7/2017   WBC      3.50 - 10.80 10*3/mm3 7.96   RBC      3.89 - 5.14 10*6/mm3 3.28 (L)   Hemoglobin      11.5 - 15.5 g/dL 10.8 (L)   Hematocrit      34.5 - 44.0 % 33.6 (L)   MCV      80.0 - 99.0 fL 102.4 (H)   MCH      27.0 - 31.0 pg 32.9 (H)   MCHC      32.0 - 36.0 g/dL 32.1   RDW      11.3 - 14.5 % 15.3 (H)   RDW-SD      37.0 - 54.0 fl 57.2 (H)   MPV      6.0 - 12.0 fL 11.2   Platelets      150 - 450 10*3/mm3 147 (L)     Component      Latest Ref Rng & Units 6/6/2017   WBC      3.50 - 10.80 10*3/mm3 12.97 (H)   RBC      3.89 - 5.14 10*6/mm3 3.41 (L)   Hemoglobin      11.5 - 15.5 g/dL 11.2 (L)   Hematocrit      34.5 - 44.0 % 35.2   MCV      80.0 - 99.0 fL 103.2 (H)   MCH      27.0 - 31.0 pg 32.8 (H)   MCHC      32.0 - 36.0 g/dL 31.8 (L)   RDW      11.3 - 14.5 % 15.8 (H)   RDW-SD      37.0 - 54.0 fl 60.2 (H)   MPV      6.0 - 12.0 fL 11.5   Platelets      150 - 450 10*3/mm3 155   Neutrophil %      41.0 - 71.0 % 72.3 (H)   Lymphocyte %      24.0 - 44.0 % 18.0 (L)   Monocyte %      0.0 - 12.0 % 8.8   Eosinophil %      0.0 - 3.0 % 0.6   Basophil %      0.0 - 1.0 % 0.1   Immature Grans %      0.0 - 0.6 % 0.2   Neutrophils,  Absolute      1.50 - 8.30 10*3/mm3 9.39 (H)   Lymphocytes, Absolute      0.60 - 4.80 10*3/mm3 2.33   Monocytes, Absolute      0.00 - 1.00 10*3/mm3 1.14 (H)   Eosinophils, Absolute      0.10 - 0.30 10*3/mm3 0.08 (L)   Basophils, Absolute      0.00 - 0.20 10*3/mm3 0.01   Immature Grans, Absolute      0.00 - 0.03 10*3/mm3 0.02     Component      Latest Ref Rng & Units 6/6/2017   Glucose      70 - 100 mg/dL 101 (H)   BUN      9 - 23 mg/dL 15   Creatinine      0.60 - 1.30 mg/dL 0.60   Sodium      132 - 146 mmol/L 143   Potassium      3.5 - 5.5 mmol/L 3.8   Chloride      99 - 109 mmol/L 111 (H)   CO2      20.0 - 31.0 mmol/L 27.0   Calcium      8.7 - 10.4 mg/dL 9.6   eGFR Non African Amer      >60 mL/min/1.73 96   BUN/Creatinine Ratio      7.0 - 25.0 25.0   Anion Gap      3.0 - 11.0 mmol/L 5.0     Component      Latest Ref Rng & Units 6/5/2017   Lactate, Venous      0.5 - 2.0 mmol/L 1.6   Urinalysis With / Culture If Indicated   Order: [id number]   Status:  Final result   Visible to patient:  No (Not Released)      Ref Range & Units 4d ago     Color, UA Yellow, Straw Yellow   Appearance, UA Clear Clear   pH, UA 5.0 - 8.0 7.5   Specific Gravity, UA 1.001 - 1.030 1.008   Glucose, UA Negative Negative   Ketones, UA Negative Negative   Bilirubin, UA Negative Negative   Blood, UA Negative Negative   Protein, UA Negative Negative   Leuk Esterase, UA Negative Negative   Nitrite, UA Negative Negative   Urobilinogen, UA 0.2 - 1.0 E.U./dL 0.2 E.U./dL   Resulting Agency  [facility]   Narrative   Urine microscopic not indicated.      Specimen Collected: 06/05/17  3:36 PM   Last Resulted: 06/05/17  4:03 PM              Component      Latest Ref Rng & Units 6/5/2017   Troponin I      0.00 - 0.07 ng/mL 0.01       CT chest w/o contrast  IMPRESSION:  Atelectatic changes in lung bases bilaterally. Some  stranding identified surrounding abnormal wall thickening of the  ascending colon. Findings suggesting either focal colitis  "or  diverticulitis. No abscess collection or free air.      D: 06/05/2017  E: 06/05/2017          This report was finalized on 6/6/2017 12:13 PM by Dr. Mirela Haynes MD.    CT abdomen pelvis w/o contrast  IMPRESSION:  Atelectatic changes in lung bases bilaterally. Some  stranding identified surrounding abnormal wall thickening of the  ascending colon. Findings suggesting either focal colitis or  diverticulitis. No abscess collection or free air.      D: 06/05/2017  E: 06/05/2017          This report was finalized on 6/6/2017 12:13 PM by Dr. Mirela Haynes MD.        MRI brain w/o contrast  IMPRESSION:  Atrophy of the brain with some chronic small vessel ischemic  changes seen in the periventricular and subcortical white matter. No  acute intracranial abnormality is identified.      D: 06/05/2017  E: 06/05/2017          This report was finalized on 6/6/2017 12:13 PM by Dr. Mirela Haynes MD.    Chest xray  IMPRESSION:  Heart is enlarged with prominence of the pulmonary  vascularity bilaterally. Lung volumes are low.      D: 06/05/2017  E: 06/05/2017      This report was finalized on 6/6/2017 12:10 PM by Dr. Mirela Haynes MD.      Condition on Discharge:  Stable    Physical Exam on Discharge:/90  Pulse 68  Temp 97.8 °F (36.6 °C) (Oral)   Resp 18  Ht 62\" (157.5 cm)  Wt 160 lb (72.6 kg)  SpO2 96%  BMI 29.26 kg/m2  Physical Exam   Constitutional: She is oriented to person, place, and time. She appears well-developed and well-nourished. No distress.   HENT:   Head: Normocephalic.   Eyes: Pupils are equal, round, and reactive to light.   Neck: Normal range of motion.   Cardiovascular: Normal rate and regular rhythm.    Pulmonary/Chest: Effort normal and breath sounds normal.   Abdominal: Soft. Bowel sounds are normal. She exhibits no distension. There is no tenderness.   Musculoskeletal: She exhibits no edema.   Neurological: She is alert and oriented to person, place, and time.   Skin: " Skin is warm and dry.   Psychiatric: She has a normal mood and affect.   Vitals reviewed.        Discharge Disposition  Rehab Facility or Unit (DC - External)    Discharge Medications   Katherin Ta   Home Medication Instructions SYLVIA:[id number]    Printed on:06/09/17 6199   Medication Information                      acetaminophen (TYLENOL) 500 MG tablet  Take 500 mg by mouth Every 6 (Six) Hours As Needed for Mild Pain (1-3).             allopurinol (ZYLOPRIM) 100 MG tablet  Take 200 mg by mouth Daily.             amLODIPine (NORVASC) 5 MG tablet  Take 1 tablet by mouth Daily.             amoxicillin-clavulanate (AUGMENTIN) 875-125 MG per tablet  Take 1 tablet by mouth Every 12 (Twelve) Hours for 5 days. Indications: Infection Within the Abdomen             Apoaequorin (PREVAGEN PO)  Take 1 tablet by mouth Daily.             aspirin 81 MG tablet  Take 81 mg by mouth Daily.             atorvastatin (LIPITOR) 10 MG tablet  Take 10 mg by mouth Every Night.             baclofen (LIORESAL) 10 MG tablet  Take 10 mg by mouth 4 (Four) Times a Day.             benazepril (LOTENSIN) 10 MG tablet  Take 10 mg by mouth Daily.             castor oil-balsam peru (VENELEX) ointment  Apply 1 application topically Every 12 (Twelve) Hours.             cetirizine (zyrTEC) 10 MG tablet  Take 1 tablet by mouth Daily.             colchicine 0.6 MG tablet  Take 0.6 mg by mouth Daily.             docusate sodium (COLACE) 100 MG capsule  Take 100 mg by mouth 2 (Two) Times a Day.             gabapentin (NEURONTIN) 800 MG tablet  Take 1,600 mg by mouth Every 6 (Six) Hours.             levothyroxine (SYNTHROID, LEVOTHROID) 75 MCG tablet  Take 75 mcg by mouth Daily.             lisinopril (PRINIVIL,ZESTRIL) 10 MG tablet  Take 10 mg by mouth Daily.             melatonin 5 MG sublingual tablet sublingual tablet  Place 1 tablet under the tongue At Night As Needed (sleep).             MORPHINE SULFATE IV  Infuse 2.399 mg into a venous catheter  Daily. MORPHINE PUMP 2.399 MG PER DAY  IMPLANT INTERNAL LEFT ABDOMEN             Multiple Vitamin (MULTI VITAMIN PO)  Take 1 tablet by mouth Daily.             Omega-3 Fatty Acids (FISH OIL) 1000 MG capsule capsule  Take 1,000 mg by mouth Daily With Breakfast.             pantoprazole (PROTONIX) 20 MG EC tablet  Take 20 mg by mouth Every Morning.                 Discharge Diet:   Diet Instructions     Diet: Regular; Thin Liquids, No Restrictions       Discharge Diet:  Regular   Fluid Consistency:  Thin Liquids, No Restrictions                 Discharge Care Plan / Instructions:    Activity at Discharge:   Activity Instructions     Activity as Tolerated                     Follow-up Appointments  No future appointments.  Additional Instructions for the Follow-ups that You Need to Schedule     Discharge Follow-up with PCP    As directed    Follow Up Details:  follow up with PCP or facility provider within 1 week of discharge                 Test Results Pending at Discharge   Order Current Status    Blood Culture Preliminary result    Blood Culture Preliminary result           KASEY Strauss 06/09/17 1:29 PM    Time: Discharge 35 min    Please note that portions of this note may have been completed with a voice recognition program. Efforts were made to edit the dictations, but occasionally words are mistranscribed.         Electronically signed by KASEY Leyva at 6/9/2017  1:46 PM

## 2017-06-10 LAB
BACTERIA SPEC AEROBE CULT: NORMAL
BACTERIA SPEC AEROBE CULT: NORMAL

## 2017-06-13 NOTE — THERAPY DISCHARGE NOTE
Acute Care - Physical Therapy Discharge Summary  [facility]       Patient Name: Katherin Ta  : 1934  MRN: [id number]    Today's Date: 2017  Onset of Illness/Injury or Date of Surgery Date: 17    Date of Referral to PT: 17  Referring Physician: MD Giuseppe      Admit Date: 2017      PT Recommendation and Plan    Visit Dx:    ICD-10-CM ICD-9-CM   1. Somnolence R40.0 780.09   2. Sepsis, due to unspecified organism A41.9 038.9     995.91   3. Impaired functional mobility, balance, gait, and endurance Z74.09 V49.89   4. Impaired mobility and ADLs Z74.09 799.89                       IP PT Goals       17 1223 17 1109 17 1350    Bed Mobility PT LTG    Bed Mobility PT LTG, Date Established   17  -MB    Bed Mobility PT LTG, Time to Achieve   1 wk  -MB    Bed Mobility PT LTG, Activity Type   supine to sit/sit to supine;roll left/roll right  -MB    Bed Mobility PT LTG, [place] Level   moderate assist (50% patient effort);2 person assist required  -MB    Bed Mobility PT LTG, Date Goal Reviewed  17  -AS     Bed Mobility PT LTG, Outcome goal ongoing  -BD goal ongoing  -AS goal ongoing  -MB    Transfer Training PT LTG    Transfer Training PT LTG, Date Established   17  -MB    Transfer Training PT LTG, Time to Achieve   1 wk  -MB    Transfer Training PT LTG, Activity Type   bed to chair /chair to bed;sit to stand/stand to sit  -MB    Transfer Training PT LTG, [place] Level   maximum assist (25% patient effort);2 person assist required  -MB    Transfer Training PT LTG, Assist Device   walker, rolling   ARJO  -MB    Transfer Training PT  LTG, Date Goal Reviewed  17  -AS     Transfer Training PT LTG, Outcome goal ongoing  -BD goal ongoing  -AS goal ongoing  -MB    Gait Training PT LTG    Gait Training Goal PT LTG, Date Established   17  -MB    Gait Training Goal PT LTG, Time to Achieve   1 wk  -MB    Gait Training Goal PT LTG, [place] Level    maximum assist (25% patient effort);2 person assist required  -MB    Gait Training Goal PT LTG, Assist Device   --   ARJO  -MB    Gait Training Goal PT LTG, Distance to Achieve   5  -MB    Gait Training Goal PT LTG, Date Goal Reviewed  06/07/17  -AS     Gait Training Goal PT LTG, Outcome goal ongoing  -BD goal ongoing  -AS goal ongoing  -MB    Static Standing Balance PT LTG    Static Standing Balance PT LTG, Date Established   06/06/17  -MB    Static Standing Balance PT LTG, Time to Achieve   1 wk  -MB    Static Standing Balance PT LTG, [place] Level   maximum assist (25% patient effort);2 person assist required  -MB    Static Standing Balance PT LTG, Assist Device   assistive Device   ARJO  -MB    Static Standing Balance PT LTG, Date Goal Reviewed  06/07/17  -AS     Static Standing Balance PT LTG, Outcome goal ongoing  -BD goal ongoing  -AS goal ongoing  -MB      User Key  (r) = Recorded By, (t) = Taken By, (c) = Cosigned By    Initials Name Provider Type    AS Nelly Fink, PTA Physical Therapy Assistant    BRYANNA Sterling, PT Physical Therapist    SANDHYA Shannon, PT Physical Therapist            Goals Status: Treatment plan discontinued secondary to discharge from acute facility.    PT Discharge Summary  Reason for Discharge: Discharge from facility  Outcomes Achieved: Refer to plan of care for updates on goals achieved  Discharge Destination: SNF      Radha Powell, PT   6/13/2017

## 2018-09-26 NOTE — PLAN OF CARE
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 Wound is red and blanchable in most areas. On waffle mattress. Ordered FILI bed per S. Ordered Venelex ointment. See med order. See skin care orders for care needs. Will continue to follow at this time. Please contact [facility] if needs arise. Thanks             no